# Patient Record
Sex: MALE | Race: WHITE | Employment: FULL TIME | URBAN - NONMETROPOLITAN AREA
[De-identification: names, ages, dates, MRNs, and addresses within clinical notes are randomized per-mention and may not be internally consistent; named-entity substitution may affect disease eponyms.]

---

## 2017-12-30 ENCOUNTER — OFFICE VISIT (OUTPATIENT)
Dept: PRIMARY CARE CLINIC | Age: 52
End: 2017-12-30
Payer: COMMERCIAL

## 2017-12-30 VITALS
OXYGEN SATURATION: 95 % | WEIGHT: 241 LBS | DIASTOLIC BLOOD PRESSURE: 102 MMHG | BODY MASS INDEX: 35.7 KG/M2 | SYSTOLIC BLOOD PRESSURE: 168 MMHG | HEART RATE: 95 BPM | TEMPERATURE: 97.3 F | HEIGHT: 69 IN

## 2017-12-30 DIAGNOSIS — Z72.0 TOBACCO ABUSE: ICD-10-CM

## 2017-12-30 DIAGNOSIS — I10 ESSENTIAL HYPERTENSION: Primary | ICD-10-CM

## 2017-12-30 PROCEDURE — 99213 OFFICE O/P EST LOW 20 MIN: CPT | Performed by: PHYSICIAN ASSISTANT

## 2017-12-30 RX ORDER — ALBUTEROL SULFATE 90 UG/1
2 AEROSOL, METERED RESPIRATORY (INHALATION) EVERY 6 HOURS PRN
Qty: 1 INHALER | Refills: 0 | Status: ON HOLD | OUTPATIENT
Start: 2017-12-30 | End: 2021-10-14

## 2017-12-30 RX ORDER — LISINOPRIL 10 MG/1
10 TABLET ORAL DAILY
Qty: 30 TABLET | Refills: 1 | Status: SHIPPED | OUTPATIENT
Start: 2017-12-30 | End: 2022-01-10 | Stop reason: SDUPTHER

## 2017-12-30 ASSESSMENT — ENCOUNTER SYMPTOMS
WHEEZING: 0
SHORTNESS OF BREATH: 0
RHINORRHEA: 0
COUGH: 1

## 2017-12-30 NOTE — PROGRESS NOTES
Subjective:      Patient ID: Pascual Spencer is a 45 y.o. male. Cough   This is a new problem. The current episode started in the past 7 days. The cough is non-productive. Associated symptoms include ear pain. Pertinent negatives include no ear congestion, fever, rhinorrhea, shortness of breath or wheezing. Risk factors for lung disease include smoking/tobacco exposure. Treatments tried: Advil, Tylenol. His past medical history is significant for bronchitis. Review of Systems   Constitutional: Negative for fever. HENT: Positive for ear pain. Negative for rhinorrhea. Respiratory: Positive for cough. Negative for shortness of breath and wheezing. Objective:   Physical Exam   Constitutional: He is oriented to person, place, and time. He appears well-developed. HENT:   Head: Normocephalic. Right Ear: External ear normal.   Left Ear: External ear normal.   Eyes: Pupils are equal, round, and reactive to light. Cardiovascular: Normal rate and regular rhythm. Pulmonary/Chest: Effort normal and breath sounds normal.   Neurological: He is alert and oriented to person, place, and time. Skin: Skin is warm and dry. Assessment:      1. Essential hypertension    2. Tobacco abuse          Plan:      Start lisinopril 10 mg daily. DASH diet. Tobacco cessation. ProAir refilled for patient. Push fluids. Patient instructed to establish care with PCP for management of chronic medical conditions and age-related health maintenance.

## 2017-12-30 NOTE — PATIENT INSTRUCTIONS
blood pressure even more. ¨ Buy foods that are labeled \"unsalted,\" \"sodium-free,\" or \"low-sodium. \" Foods labeled \"reduced-sodium\" and \"light sodium\" may still have too much sodium. ¨ Flavor your food with garlic, lemon juice, onion, vinegar, herbs, and spices instead of salt. Do not use soy sauce, steak sauce, onion salt, garlic salt, mustard, or ketchup on your food. ¨ Use less salt (or none) when recipes call for it. You can often use half the salt a recipe calls for without losing flavor. · Be physically active. Get at least 30 minutes of exercise on most days of the week. Walking is a good choice. You also may want to do other activities, such as running, swimming, cycling, or playing tennis or team sports. · Limit alcohol to 2 drinks a day for men and 1 drink a day for women. · Eat plenty of fruits, vegetables, and low-fat dairy products. Eat less saturated and total fats. How is high blood pressure treated? · Your doctor will suggest making lifestyle changes. For example, your doctor may ask you to eat healthy foods, quit smoking, lose extra weight, and be more active. · If lifestyle changes don't help enough or your blood pressure is very high, you will have to take medicine every day. Follow-up care is a key part of your treatment and safety. Be sure to make and go to all appointments, and call your doctor if you are having problems. It's also a good idea to know your test results and keep a list of the medicines you take. Where can you learn more? Go to https://chpepiceweb.[email or web address]. org and sign in to your [employer] account. Enter P501 in the [facility] box to learn more about \"Learning About High Blood Pressure. \"     If you do not have an account, please click on the \"Sign Up Now\" link. Current as of: September 21, 2016  Content Version: 11.4  © 8519-9639 Healthwise, [employer]. Care instructions adapted under license by [facility] [place] ([facility]).  If you have questions about a medical

## 2021-06-02 ENCOUNTER — OFFICE VISIT (OUTPATIENT)
Dept: PRIMARY CARE CLINIC | Age: 56
End: 2021-06-02
Payer: COMMERCIAL

## 2021-06-02 VITALS
HEART RATE: 100 BPM | BODY MASS INDEX: 33.68 KG/M2 | DIASTOLIC BLOOD PRESSURE: 103 MMHG | OXYGEN SATURATION: 98 % | RESPIRATION RATE: 16 BRPM | HEIGHT: 69 IN | TEMPERATURE: 98.2 F | SYSTOLIC BLOOD PRESSURE: 162 MMHG | WEIGHT: 227.4 LBS

## 2021-06-02 DIAGNOSIS — R06.2 WHEEZING: ICD-10-CM

## 2021-06-02 DIAGNOSIS — R03.0 ELEVATED BLOOD PRESSURE READING: ICD-10-CM

## 2021-06-02 DIAGNOSIS — H66.003 NON-RECURRENT ACUTE SUPPURATIVE OTITIS MEDIA OF BOTH EARS WITHOUT SPONTANEOUS RUPTURE OF TYMPANIC MEMBRANES: Primary | ICD-10-CM

## 2021-06-02 PROCEDURE — 99203 OFFICE O/P NEW LOW 30 MIN: CPT | Performed by: NURSE PRACTITIONER

## 2021-06-02 RX ORDER — ALBUTEROL SULFATE 90 UG/1
2 AEROSOL, METERED RESPIRATORY (INHALATION) EVERY 6 HOURS PRN
Qty: 1 INHALER | Refills: 0 | Status: SHIPPED | OUTPATIENT
Start: 2021-06-02

## 2021-06-02 RX ORDER — AZITHROMYCIN 250 MG/1
250 TABLET, FILM COATED ORAL SEE ADMIN INSTRUCTIONS
Qty: 6 TABLET | Refills: 0 | Status: SHIPPED | OUTPATIENT
Start: 2021-06-02 | End: 2021-06-07

## 2021-06-02 ASSESSMENT — ENCOUNTER SYMPTOMS
CHEST TIGHTNESS: 0
SORE THROAT: 1
VOMITING: 0
COUGH: 1
SWOLLEN GLANDS: 1
DIARRHEA: 0
NAUSEA: 0
SHORTNESS OF BREATH: 0
WHEEZING: 0

## 2021-06-02 ASSESSMENT — PATIENT HEALTH QUESTIONNAIRE - PHQ9
SUM OF ALL RESPONSES TO PHQ QUESTIONS 1-9: 0
1. LITTLE INTEREST OR PLEASURE IN DOING THINGS: 0
SUM OF ALL RESPONSES TO PHQ QUESTIONS 1-9: 0
SUM OF ALL RESPONSES TO PHQ QUESTIONS 1-9: 0
SUM OF ALL RESPONSES TO PHQ9 QUESTIONS 1 & 2: 0
2. FEELING DOWN, DEPRESSED OR HOPELESS: 0

## 2021-06-02 NOTE — PROGRESS NOTES
[address] [place] [address]  Dept: [phone]  Dept Fax: [phone]  Loc: [phone]        CHIEF COMPLAINT       Chief Complaint   Patient presents with    Pharyngitis     Sx started couple days ago. Has a couple bad teeth he thinks could be the reason. Not able to get into dentist.       Nurses Notes reviewed and I agree except as noted in the HPI. 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 The problem occurs constantly. The problem has been unchanged. Associated symptoms include coughing (chronic), a sore throat (mild) and swollen glands. Pertinent negatives include no chest pain, congestion, fatigue, fever, headaches, myalgias, nausea or vomiting. He has tried acetaminophen and NSAIDs for the symptoms. The treatment provided mild relief. Pt states has a couple bad teeth on the bottom of his mouth that he needs removed; pt denies pain around teeth but states he wonders if this is causing his throat pain. Pt established with a dentist, states he just needs to make an appt. Denies contact with any ill persons recently. Pt also states he is a smoker and occasionally needs to use his albuterol inhaler but it is . Hx hypertension, states took a medication for it for appx 1 month but did not like how it made him feel and did not return to provider, states going to doctor makes him anxious so he avoids it. Pt states does not have a PCP currently. REVIEW OF SYSTEMS     Review of Systems   Constitutional: Negative for fatigue and fever. HENT: Positive for dental problem, ear pain, sneezing and sore throat (mild). Negative for congestion. Respiratory: Positive for cough (chronic). Negative for chest tightness, shortness of breath and wheezing. Lips: Pink. Mouth: Mucous membranes are moist.      Pharynx: Oropharynx is clear. No oropharyngeal exudate or posterior oropharyngeal erythema (slight). Tonsils: No tonsillar exudate. 0 on the right. 0 on the left. Eyes:      Pupils: Pupils are equal, round, and reactive to light. Cardiovascular:      Rate and Rhythm: Normal rate and regular rhythm. Heart sounds: Normal heart sounds, S1 normal and S2 normal. No murmur heard. Pulmonary:      Effort: Pulmonary effort is normal. No accessory muscle usage or respiratory distress. Breath sounds: Normal breath sounds. No wheezing or rhonchi. Musculoskeletal:         General: Normal range of motion. Cervical back: Normal range of motion and neck supple. No rigidity. Lymphadenopathy:      Cervical: No cervical adenopathy. Skin:     General: Skin is warm and dry. Capillary Refill: Capillary refill takes less than 2 seconds. Neurological:      General: No focal deficit present. Mental Status: He is alert. DIAGNOSTIC RESULTS   Labs:No results found for this visit on 06/02/21. IMAGING:        CLINICAL COURSE:     Vitals:    06/02/21 1448 06/02/21 1551   BP: (!) 165/110 (!) 162/103   Site: Right Upper Arm Right Upper Arm   Position: Sitting Sitting   Cuff Size: Medium Adult Medium Adult   Pulse: 100    Resp: 16    Temp: 98.2 °F (36.8 °C)    TempSrc: Temporal    SpO2: 98%    Weight: 227 lb 6.4 oz (103.1 kg)    Height: 5' 9\" (1.753 m)            PROCEDURES:  None  FINAL IMPRESSION      1. Non-recurrent acute suppurative otitis media of both ears without spontaneous rupture of tympanic membranes    2. Wheezing         DISPOSITION/PLAN     Zpak for bilateral AOM. Recommend pt continue with daily antihistamine and flonase nasal spray for allergy management. Increase fluids. Discussed supportive measures for symptom relief and educated pt on s/s that warrant return to clinic.   Will refill albuterol inhaler for pt to are not taking a prescription pain medicine, take an over-the-counter medicine, such as acetaminophen (Tylenol), ibuprofen (Advil, Motrin), or naproxen (Aleve). Read and follow all instructions on the label. ? Do not take two or more pain medicines at the same time unless the doctor told you to. Many pain medicines have acetaminophen, which is Tylenol. Too much acetaminophen (Tylenol) can be harmful. · Plan to take a full dose of pain reliever before bedtime. Getting enough sleep will help you get better. · Try a warm, moist washcloth on the ear. It may help relieve pain. · If your doctor prescribed antibiotics, take them as directed. Do not stop taking them just because you feel better. You need to take the full course of antibiotics. When should you call for help? Call your doctor now or seek immediate medical care if:    · You have new or increasing ear pain.     · You have new or increasing pus or blood draining from your ear.     · You have a fever with a stiff neck or a severe headache. Watch closely for changes in your health, and be sure to contact your doctor if:    · You have new or worse symptoms.     · You are not getting better after taking an antibiotic for 2 days. Where can you learn more? Go to https://[email or web address].Amvona. org and sign in to your [employer] account. Enter W003 in the [facility] box to learn more about \"Ear Infection (Otitis Media): Care Instructions. \"     If you do not have an account, please click on the \"Sign Up Now\" link. Current as of: December 2, 2020               Content Version: 12.8  © 2006-2021 Healthwise, Incorporated. Care instructions adapted under license by [facility] ([facility]). If you have questions about a medical condition or this instruction, always ask your healthcare professional. [place] [address] any warranty or liability for your use of this information.                The use, risks, benefits, and potential side effects of prescribed and/or recommended medications were discussed. All questions were answered and the patient/caregiver voiced understanding. No orders of the defined types were placed in this encounter. Outpatient Encounter Medications as of 6/2/2021   Medication Sig Dispense Refill    azithromycin (ZITHROMAX) 250 MG tablet Take 1 tablet by mouth See Admin Instructions for 5 days 500mg on day 1 followed by 250mg on days 2 - 5 6 tablet 0    albuterol sulfate HFA (VENTOLIN HFA) 108 (90 Base) MCG/ACT inhaler Inhale 2 puffs into the lungs every 6 hours as needed for Wheezing 1 Inhaler 0    albuterol sulfate HFA (PROVENTIL HFA) 108 (90 Base) MCG/ACT inhaler Inhale 2 puffs into the lungs every 6 hours as needed for Wheezing or Shortness of Breath 1 Inhaler 0    lisinopril (PRINIVIL;ZESTRIL) 10 MG tablet Take 1 tablet by mouth daily 30 tablet 1    Chlorpheniramine-PSE-Ibuprofen (ADVIL ALLERGY SINUS PO) Take  by mouth.  loratadine (CLARITIN) 10 MG tablet Take 10 mg by mouth daily.  ibuprofen (IBU) 800 MG tablet Take 1 tablet by mouth every 8 hours as needed for Pain. 30 tablet 0     No facility-administered encounter medications on file as of 6/2/2021. Return if symptoms worsen or fail to improve.                 Electronically signed by CHRISTAL Murillo CNP on 6/2/2021 at 4:29 PM

## 2021-06-02 NOTE — PATIENT INSTRUCTIONS
Will send in zpak for bilateral ear infection. Take the full course even if you are feeling better. Increase your fluids to help thin secretions; goal is 2-3 liters/day for average adult. Continue daily antihistamine and flonase nasal spray. Can run a cool mist humidifier at bedside. May try warm salt water gargles, chloraseptic throat spray, or lozenges such as Cepacol sore throat lozenges, for throat pain. Cool beverages and popsicles can help as well. Tylenol as needed for pain. If your symptoms worsen, please return to clinic. Recommend establishing with a PCP for follow up. Monitor your blood pressure at home and keep a log. If your blood pressure remains elevated, please follow up with a physician/provider to discuss. Stop smoking. Patient Education        Ear Infection (Otitis Media): Care Instructions  Overview     An ear infection may start with a cold and affect the middle ear (otitis media). It can hurt a lot. Most ear infections clear up on their own in a couple of days and do not need antibiotics. Also, antibiotics do not work against viruses, which may be the cause of your infection. Regular doses of pain relievers are the best way to reduce your fever and help you feel better. Follow-up care is a key part of your treatment and safety. Be sure to make and go to all appointments, and call your doctor if you are having problems. It's also a good idea to know your test results and keep a list of the medicines you take. How can you care for yourself at home? · Take pain medicines exactly as directed. ? If the doctor gave you a prescription medicine for pain, take it as prescribed. ? If you are not taking a prescription pain medicine, take an over-the-counter medicine, such as acetaminophen (Tylenol), ibuprofen (Advil, Motrin), or naproxen (Aleve). Read and follow all instructions on the label. ? Do not take two or more pain medicines at the same time unless the doctor told you to. Many pain medicines have acetaminophen, which is Tylenol. Too much acetaminophen (Tylenol) can be harmful. · Plan to take a full dose of pain reliever before bedtime. Getting enough sleep will help you get better. · Try a warm, moist washcloth on the ear. It may help relieve pain. · If your doctor prescribed antibiotics, take them as directed. Do not stop taking them just because you feel better. You need to take the full course of antibiotics. When should you call for help? Call your doctor now or seek immediate medical care if:    · You have new or increasing ear pain.     · You have new or increasing pus or blood draining from your ear.     · You have a fever with a stiff neck or a severe headache. Watch closely for changes in your health, and be sure to contact your doctor if:    · You have new or worse symptoms.     · You are not getting better after taking an antibiotic for 2 days. Where can you learn more? Go to https://Resermap.ePrivateHire. org and sign in to your [employer] account. Enter W127 in the [employer] box to learn more about \"Ear Infection (Otitis Media): Care Instructions. \"     If you do not have an account, please click on the \"Sign Up Now\" link. Current as of: December 2, 2020               Content Version: 12.8  © 2006-2021 Healthwise, Incorporated. Care instructions adapted under license by [facility] ([facility]). If you have questions about a medical condition or this instruction, always ask your healthcare professional. [place] [address] any warranty or liability for your use of this information.

## 2021-06-03 ENCOUNTER — TELEPHONE (OUTPATIENT)
Dept: PRIMARY CARE CLINIC | Age: 56
End: 2021-06-03

## 2021-06-03 NOTE — TELEPHONE ENCOUNTER
Patient called asking for  Lisinopril from [facility]. She said that he needs to be evaluated. Last time he was on medication was in 2017. I told him to to contact Family practice and get an appt with any of the providers to re-start the medication.

## 2021-10-14 ENCOUNTER — APPOINTMENT (OUTPATIENT)
Dept: CT IMAGING | Age: 56
DRG: 065 | End: 2021-10-14
Payer: COMMERCIAL

## 2021-10-14 ENCOUNTER — HOSPITAL ENCOUNTER (INPATIENT)
Age: 56
LOS: 5 days | Discharge: NURSING, REHAB OR LONG-TERM CARE | DRG: 065 | End: 2021-10-19
Attending: EMERGENCY MEDICINE | Admitting: STUDENT IN AN ORGANIZED HEALTH CARE EDUCATION/TRAINING PROGRAM
Payer: COMMERCIAL

## 2021-10-14 ENCOUNTER — APPOINTMENT (OUTPATIENT)
Dept: GENERAL RADIOLOGY | Age: 56
DRG: 065 | End: 2021-10-14
Payer: COMMERCIAL

## 2021-10-14 DIAGNOSIS — I63.9 CEREBROVASCULAR ACCIDENT (CVA), UNSPECIFIED MECHANISM (HCC): Primary | ICD-10-CM

## 2021-10-14 PROBLEM — R29.90 STROKE-LIKE SYMPTOM: Status: ACTIVE | Noted: 2021-10-14

## 2021-10-14 LAB
% CKMB: 1.5 % (ref 0–3.5)
ABSOLUTE EOS #: 0.07 K/UL (ref 0–0.44)
ABSOLUTE IMMATURE GRANULOCYTE: 0.03 K/UL (ref 0–0.3)
ABSOLUTE LYMPH #: 1.27 K/UL (ref 1.1–3.7)
ABSOLUTE MONO #: 0.66 K/UL (ref 0.1–1.2)
ALLEN TEST: ABNORMAL
AMPHETAMINE SCREEN URINE: NEGATIVE
ANION GAP SERPL CALCULATED.3IONS-SCNC: 17 MMOL/L (ref 9–17)
ANION GAP: 11 MMOL/L (ref 7–16)
BARBITURATE SCREEN URINE: NEGATIVE
BASOPHILS # BLD: 1 % (ref 0–2)
BASOPHILS ABSOLUTE: 0.08 K/UL (ref 0–0.2)
BENZODIAZEPINE SCREEN, URINE: NEGATIVE
BUN BLDV-MCNC: 8 MG/DL (ref 6–20)
BUN/CREAT BLD: ABNORMAL (ref 9–20)
BUPRENORPHINE URINE: NORMAL
CALCIUM SERPL-MCNC: 9.2 MG/DL (ref 8.6–10.4)
CANNABINOID SCREEN URINE: NEGATIVE
CHLORIDE BLD-SCNC: 97 MMOL/L (ref 98–107)
CK MB: 2 NG/ML
CKMB INTERPRETATION: ABNORMAL
CO2: 19 MMOL/L (ref 20–31)
COCAINE METABOLITE, URINE: NEGATIVE
CREAT SERPL-MCNC: 0.81 MG/DL (ref 0.7–1.2)
DIFFERENTIAL TYPE: ABNORMAL
EOSINOPHILS RELATIVE PERCENT: 1 % (ref 1–4)
FIO2: ABNORMAL
GFR AFRICAN AMERICAN: >60 ML/MIN
GFR NON-AFRICAN AMERICAN: >60 ML/MIN
GFR NON-AFRICAN AMERICAN: >60 ML/MIN
GFR SERPL CREATININE-BSD FRML MDRD: >60 ML/MIN
GFR SERPL CREATININE-BSD FRML MDRD: ABNORMAL ML/MIN/{1.73_M2}
GFR SERPL CREATININE-BSD FRML MDRD: ABNORMAL ML/MIN/{1.73_M2}
GFR SERPL CREATININE-BSD FRML MDRD: NORMAL ML/MIN/{1.73_M2}
GLUCOSE BLD-MCNC: 148 MG/DL (ref 75–110)
GLUCOSE BLD-MCNC: 184 MG/DL (ref 70–99)
GLUCOSE BLD-MCNC: 202 MG/DL (ref 74–100)
HCO3 VENOUS: 23.3 MMOL/L (ref 22–29)
HCT VFR BLD CALC: 46.5 % (ref 40.7–50.3)
HEMOGLOBIN: 15.6 G/DL (ref 13–17)
IMMATURE GRANULOCYTES: 0 %
INR BLD: 1
LYMPHOCYTES # BLD: 14 % (ref 24–43)
MCH RBC QN AUTO: 31.1 PG (ref 25.2–33.5)
MCHC RBC AUTO-ENTMCNC: 33.5 G/DL (ref 28.4–34.8)
MCV RBC AUTO: 92.8 FL (ref 82.6–102.9)
MDMA URINE: NORMAL
METHADONE SCREEN, URINE: NEGATIVE
METHAMPHETAMINE, URINE: NORMAL
MODE: ABNORMAL
MONOCYTES # BLD: 7 % (ref 3–12)
MYOGLOBIN: 69 NG/ML (ref 28–72)
NEGATIVE BASE EXCESS, VEN: ABNORMAL (ref 0–2)
NRBC AUTOMATED: 0 PER 100 WBC
O2 DEVICE/FLOW/%: ABNORMAL
O2 SAT, VEN: 76 % (ref 60–85)
OPIATES, URINE: NEGATIVE
OXYCODONE SCREEN URINE: NEGATIVE
PARTIAL THROMBOPLASTIN TIME: 24.2 SEC (ref 20.5–30.5)
PATIENT TEMP: ABNORMAL
PCO2, VEN: 34.1 MM HG (ref 41–51)
PDW BLD-RTO: 12.4 % (ref 11.8–14.4)
PH VENOUS: 7.44 (ref 7.32–7.43)
PHENCYCLIDINE, URINE: NEGATIVE
PLATELET # BLD: 212 K/UL (ref 138–453)
PLATELET ESTIMATE: ABNORMAL
PMV BLD AUTO: 9.1 FL (ref 8.1–13.5)
PO2, VEN: 38.6 MM HG (ref 30–50)
POC BUN: 8 MG/DL (ref 8–26)
POC CHLORIDE: 103 MMOL/L (ref 98–107)
POC CREATININE: 0.73 MG/DL (ref 0.51–1.19)
POC HEMATOCRIT: 47 % (ref 41–53)
POC HEMOGLOBIN: 15.9 G/DL (ref 13.5–17.5)
POC IONIZED CALCIUM: 1.02 MMOL/L (ref 1.15–1.33)
POC LACTIC ACID: 1.68 MMOL/L (ref 0.56–1.39)
POC PCO2 TEMP: ABNORMAL MM HG
POC PH TEMP: ABNORMAL
POC PO2 TEMP: ABNORMAL MM HG
POC POTASSIUM: 4 MMOL/L (ref 3.5–4.5)
POC SODIUM: 137 MMOL/L (ref 138–146)
POC TCO2: 24 MMOL/L (ref 22–30)
POSITIVE BASE EXCESS, VEN: 0 (ref 0–3)
POTASSIUM SERPL-SCNC: 4.1 MMOL/L (ref 3.7–5.3)
PROPOXYPHENE, URINE: NORMAL
PROTHROMBIN TIME: 10.3 SEC (ref 9.1–12.3)
RBC # BLD: 5.01 M/UL (ref 4.21–5.77)
RBC # BLD: ABNORMAL 10*6/UL
SAMPLE SITE: ABNORMAL
SARS-COV-2, RAPID: NOT DETECTED
SEG NEUTROPHILS: 77 % (ref 36–65)
SEGMENTED NEUTROPHILS ABSOLUTE COUNT: 7.24 K/UL (ref 1.5–8.1)
SODIUM BLD-SCNC: 133 MMOL/L (ref 135–144)
SPECIMEN DESCRIPTION: NORMAL
TEST INFORMATION: NORMAL
TOTAL CK: 137 U/L (ref 39–308)
TOTAL CO2, VENOUS: ABNORMAL MMOL/L (ref 23–30)
TRICYCLIC ANTIDEPRESSANTS, UR: NORMAL
TROPONIN INTERP: ABNORMAL
TROPONIN T: ABNORMAL NG/ML
TROPONIN, HIGH SENSITIVITY: 8 NG/L (ref 0–22)
WBC # BLD: 9.4 K/UL (ref 3.5–11.3)
WBC # BLD: ABNORMAL 10*3/UL

## 2021-10-14 PROCEDURE — 80051 ELECTROLYTE PANEL: CPT

## 2021-10-14 PROCEDURE — 85730 THROMBOPLASTIN TIME PARTIAL: CPT

## 2021-10-14 PROCEDURE — 6360000004 HC RX CONTRAST MEDICATION: Performed by: STUDENT IN AN ORGANIZED HEALTH CARE EDUCATION/TRAINING PROGRAM

## 2021-10-14 PROCEDURE — 85014 HEMATOCRIT: CPT

## 2021-10-14 PROCEDURE — 80307 DRUG TEST PRSMV CHEM ANLYZR: CPT

## 2021-10-14 PROCEDURE — 70450 CT HEAD/BRAIN W/O DYE: CPT

## 2021-10-14 PROCEDURE — 82330 ASSAY OF CALCIUM: CPT

## 2021-10-14 PROCEDURE — 2060000000 HC ICU INTERMEDIATE R&B

## 2021-10-14 PROCEDURE — 83605 ASSAY OF LACTIC ACID: CPT

## 2021-10-14 PROCEDURE — 93005 ELECTROCARDIOGRAM TRACING: CPT | Performed by: EMERGENCY MEDICINE

## 2021-10-14 PROCEDURE — 82565 ASSAY OF CREATININE: CPT

## 2021-10-14 PROCEDURE — 82553 CREATINE MB FRACTION: CPT

## 2021-10-14 PROCEDURE — 82550 ASSAY OF CK (CPK): CPT

## 2021-10-14 PROCEDURE — 2580000003 HC RX 258: Performed by: STUDENT IN AN ORGANIZED HEALTH CARE EDUCATION/TRAINING PROGRAM

## 2021-10-14 PROCEDURE — 6360000002 HC RX W HCPCS: Performed by: STUDENT IN AN ORGANIZED HEALTH CARE EDUCATION/TRAINING PROGRAM

## 2021-10-14 PROCEDURE — 82947 ASSAY GLUCOSE BLOOD QUANT: CPT

## 2021-10-14 PROCEDURE — 87635 SARS-COV-2 COVID-19 AMP PRB: CPT

## 2021-10-14 PROCEDURE — 84484 ASSAY OF TROPONIN QUANT: CPT

## 2021-10-14 PROCEDURE — 6370000000 HC RX 637 (ALT 250 FOR IP): Performed by: STUDENT IN AN ORGANIZED HEALTH CARE EDUCATION/TRAINING PROGRAM

## 2021-10-14 PROCEDURE — 99284 EMERGENCY DEPT VISIT MOD MDM: CPT

## 2021-10-14 PROCEDURE — 85025 COMPLETE CBC W/AUTO DIFF WBC: CPT

## 2021-10-14 PROCEDURE — 84520 ASSAY OF UREA NITROGEN: CPT

## 2021-10-14 PROCEDURE — 80048 BASIC METABOLIC PNL TOTAL CA: CPT

## 2021-10-14 PROCEDURE — 99446 NTRPROF PH1/NTRNET/EHR 5-10: CPT | Performed by: PSYCHIATRY & NEUROLOGY

## 2021-10-14 PROCEDURE — 83874 ASSAY OF MYOGLOBIN: CPT

## 2021-10-14 PROCEDURE — 71045 X-RAY EXAM CHEST 1 VIEW: CPT

## 2021-10-14 PROCEDURE — 82803 BLOOD GASES ANY COMBINATION: CPT

## 2021-10-14 PROCEDURE — 85610 PROTHROMBIN TIME: CPT

## 2021-10-14 PROCEDURE — 70496 CT ANGIOGRAPHY HEAD: CPT

## 2021-10-14 RX ORDER — ATORVASTATIN CALCIUM 80 MG/1
80 TABLET, FILM COATED ORAL NIGHTLY
Status: DISCONTINUED | OUTPATIENT
Start: 2021-10-14 | End: 2021-10-19 | Stop reason: HOSPADM

## 2021-10-14 RX ORDER — ASPIRIN 81 MG/1
81 TABLET ORAL DAILY
Status: DISCONTINUED | OUTPATIENT
Start: 2021-10-15 | End: 2021-10-19 | Stop reason: HOSPADM

## 2021-10-14 RX ORDER — SODIUM CHLORIDE 0.9 % (FLUSH) 0.9 %
5-40 SYRINGE (ML) INJECTION EVERY 12 HOURS SCHEDULED
Status: DISCONTINUED | OUTPATIENT
Start: 2021-10-14 | End: 2021-10-19 | Stop reason: HOSPADM

## 2021-10-14 RX ORDER — SODIUM CHLORIDE 9 MG/ML
25 INJECTION, SOLUTION INTRAVENOUS PRN
Status: DISCONTINUED | OUTPATIENT
Start: 2021-10-14 | End: 2021-10-19 | Stop reason: HOSPADM

## 2021-10-14 RX ORDER — SODIUM CHLORIDE 0.9 % (FLUSH) 0.9 %
5-40 SYRINGE (ML) INJECTION PRN
Status: DISCONTINUED | OUTPATIENT
Start: 2021-10-14 | End: 2021-10-19 | Stop reason: HOSPADM

## 2021-10-14 RX ORDER — LABETALOL HYDROCHLORIDE 5 MG/ML
5 INJECTION, SOLUTION INTRAVENOUS EVERY 4 HOURS PRN
Status: DISCONTINUED | OUTPATIENT
Start: 2021-10-14 | End: 2021-10-19 | Stop reason: HOSPADM

## 2021-10-14 RX ORDER — ALBUTEROL SULFATE 90 UG/1
2 AEROSOL, METERED RESPIRATORY (INHALATION) EVERY 6 HOURS PRN
Status: DISCONTINUED | OUTPATIENT
Start: 2021-10-14 | End: 2021-10-19 | Stop reason: HOSPADM

## 2021-10-14 RX ORDER — POLYETHYLENE GLYCOL 3350 17 G/17G
17 POWDER, FOR SOLUTION ORAL DAILY PRN
Status: DISCONTINUED | OUTPATIENT
Start: 2021-10-14 | End: 2021-10-19 | Stop reason: HOSPADM

## 2021-10-14 RX ORDER — ONDANSETRON 4 MG/1
4 TABLET, ORALLY DISINTEGRATING ORAL EVERY 8 HOURS PRN
Status: DISCONTINUED | OUTPATIENT
Start: 2021-10-14 | End: 2021-10-19 | Stop reason: HOSPADM

## 2021-10-14 RX ORDER — CLOPIDOGREL BISULFATE 75 MG/1
75 TABLET ORAL DAILY
Status: DISCONTINUED | OUTPATIENT
Start: 2021-10-15 | End: 2021-10-19 | Stop reason: HOSPADM

## 2021-10-14 RX ORDER — NICARDIPINE HYDROCHLORIDE 0.1 MG/ML
3-15 INJECTION INTRAVENOUS CONTINUOUS
Status: DISCONTINUED | OUTPATIENT
Start: 2021-10-14 | End: 2021-10-14

## 2021-10-14 RX ORDER — SODIUM CHLORIDE 9 MG/ML
INJECTION, SOLUTION INTRAVENOUS CONTINUOUS
Status: DISCONTINUED | OUTPATIENT
Start: 2021-10-14 | End: 2021-10-16

## 2021-10-14 RX ORDER — ONDANSETRON 2 MG/ML
4 INJECTION INTRAMUSCULAR; INTRAVENOUS EVERY 6 HOURS PRN
Status: DISCONTINUED | OUTPATIENT
Start: 2021-10-14 | End: 2021-10-19 | Stop reason: HOSPADM

## 2021-10-14 RX ADMIN — SODIUM CHLORIDE: 9 INJECTION, SOLUTION INTRAVENOUS at 21:43

## 2021-10-14 RX ADMIN — ATORVASTATIN CALCIUM 80 MG: 80 TABLET, FILM COATED ORAL at 23:05

## 2021-10-14 RX ADMIN — SODIUM CHLORIDE, PRESERVATIVE FREE 10 ML: 5 INJECTION INTRAVENOUS at 21:45

## 2021-10-14 RX ADMIN — INSULIN LISPRO 1 UNITS: 100 INJECTION, SOLUTION INTRAVENOUS; SUBCUTANEOUS at 21:37

## 2021-10-14 RX ADMIN — IOPAMIDOL 90 ML: 755 INJECTION, SOLUTION INTRAVENOUS at 16:29

## 2021-10-14 RX ADMIN — ENOXAPARIN SODIUM 40 MG: 40 INJECTION SUBCUTANEOUS at 22:57

## 2021-10-14 ASSESSMENT — ENCOUNTER SYMPTOMS
RHINORRHEA: 0
PHOTOPHOBIA: 0
CHEST TIGHTNESS: 0
WHEEZING: 0
VOMITING: 0
DIARRHEA: 0
SHORTNESS OF BREATH: 0
COUGH: 1
ABDOMINAL PAIN: 0
NAUSEA: 0
CONSTIPATION: 0
BACK PAIN: 0

## 2021-10-14 ASSESSMENT — PAIN SCALES - GENERAL: PAINLEVEL_OUTOF10: 0

## 2021-10-14 NOTE — ED NOTES
Portable xray at bedside. Tolerated well.  Will continue to monitor     Julio Escudero RN  10/14/21 6438

## 2021-10-14 NOTE — ED PROVIDER NOTES
[facility]  Emergency Department Encounter  EmergencyMedicine Resident     Pt Name:Jorge Chamorro  MRN: [id number]  [place] 1965  Date of evaluation: 10/14/21  PCP:  No primary care provider on file. CHIEF COMPLAINT       Chief Complaint   Patient presents with    Cerebrovascular Accident       HISTORY OF PRESENT ILLNESS  (Location/Symptom, Timing/Onset, Context/Setting, Quality, Duration, Modifying Factors, Severity.)      Tavia España is a 60 y.o. male who presents with left-sided weakness and right-sided facial droop. Arrives via [address]. Patient states that at 6 AM he got home, laid down, started feeling weak on the left side. Patient arrives in c-collar because they are not sure if he fell. No reported neck or head pain. Patient does not have much of a medical history other than hypertension does not smoke. Patient states he has had a cough and his son had a cough as well. Ongoing for several days, but does note his history of smoking. Left leg was called for concern for stroke. The patient was initially hypertensive greater than 610 systolic and was started on Cardene with blood pressure on arrival 860 systolic. PAST MEDICAL / SURGICAL / SOCIAL / FAMILY HISTORY      has a past medical history of Hypertension and Tobacco abuse.       has no past surgical history on file.       Social History     Socioeconomic History    Marital status:      Spouse name: Not on file    Number of children: Not on file    Years of education: Not on file    Highest education level: Not on file   Occupational History    Not on file   Tobacco Use    Smoking status: Current Every Day Smoker     Packs/day: 1.00     Years: 30.00     Pack years: 30.00     Types: Cigarettes    Smokeless tobacco: Never Used   Substance and Sexual Activity    Alcohol use: Yes     Comment: moderately    Drug use: No    Sexual activity: Not on file   Other Topics Concern    Not on file   Social History Narrative  Not on file     Social Determinants of Health     Financial Resource Strain:     Difficulty of Paying Living Expenses:    Food Insecurity:     Worried About Running Out of Food in the Last Year:     [address] in the Last Year:    Transportation Needs:     Lack of Transportation (Medical):  Lack of Transportation (Non-Medical):    Physical Activity:     Days of Exercise per Week:     Minutes of Exercise per Session:    Stress:     Feeling of Stress :    Social Connections:     Frequency of Communication with Friends and Family:     Frequency of Social Gatherings with Friends and Family:     Attends Faith Services:     Active Member of Clubs or Organizations:     Attends Club or Organization Meetings:     Marital Status:    Intimate Partner Violence:     Fear of Current or Ex-Partner:     Emotionally Abused:     Physically Abused:     Sexually Abused:        No family history on file. Allergies:  Penicillins    Home Medications:  Prior to Admission medications    Medication Sig Start Date End Date Taking? Authorizing Provider   albuterol sulfate HFA (VENTOLIN HFA) 108 (90 Base) MCG/ACT inhaler Inhale 2 puffs into the lungs every 6 hours as needed for Wheezing 6/2/21  Yes Marita Dominguez APRN - CNP   loratadine (CLARITIN) 10 MG tablet Take 10 mg by mouth daily. Yes Historical Provider, MD   lisinopril (PRINIVIL;ZESTRIL) 10 MG tablet Take 1 tablet by mouth daily 12/30/17   René Esquivel, [address]   Chlorpheniramine-PSE-Ibuprofen (ADVIL ALLERGY SINUS PO) Take  by mouth. Historical Provider, MD   ibuprofen (IBU) 800 MG tablet Take 1 tablet by mouth every 8 hours as needed for Pain. 9/27/13   JAMIL Briones       REVIEW OF SYSTEMS    (2-9 systems for level 4, 10 or more for level 5)      Review of Systems   Constitutional: Negative for chills, diaphoresis, fatigue and fever. HENT: Negative for congestion and rhinorrhea. Eyes: Negative for photophobia and visual disturbance. Respiratory: Positive for cough. Negative for chest tightness, shortness of breath and wheezing. Cardiovascular: Negative for chest pain, palpitations and leg swelling. Gastrointestinal: Negative for abdominal pain, constipation, diarrhea, nausea and vomiting. Genitourinary: Negative for difficulty urinating, dysuria, flank pain and hematuria. Musculoskeletal: Negative for arthralgias, back pain, neck pain and neck stiffness. Skin: Negative for rash and wound. Neurological: Positive for weakness. Negative for dizziness, light-headedness, numbness and headaches. PHYSICAL EXAM   (up to 7 for level 4, 8 or more for level 5)      INITIAL VITALS:   BP (!) 145/92   Pulse 82   Temp 98.1 °F (36.7 °C) (Oral)   Resp 19   Ht 5' 9.5\" (1.765 m)   Wt 190 lb (86.2 kg)   SpO2 94%   BMI 27.66 kg/m²     Physical Exam  Vitals and nursing note reviewed. Constitutional:       General: He is not in acute distress. Appearance: Normal appearance. He is well-developed. He is not diaphoretic. HENT:      Head: Normocephalic and atraumatic. Mouth/Throat:      Mouth: Mucous membranes are moist.      Pharynx: Oropharynx is clear. Eyes:      Conjunctiva/sclera: Conjunctivae normal.      Pupils: Pupils are equal, round, and reactive to light. Neck:      Vascular: No JVD. Trachea: No tracheal deviation. Cardiovascular:      Rate and Rhythm: Regular rhythm. Tachycardia present. Pulses: Normal pulses. Heart sounds: Normal heart sounds. Pulmonary:      Effort: Pulmonary effort is normal. No respiratory distress. Breath sounds: Normal breath sounds. No wheezing or rales. Chest:      Chest wall: No tenderness. Abdominal:      General: Bowel sounds are normal. There is no distension. Palpations: Abdomen is soft. Tenderness: There is no abdominal tenderness. There is no guarding. Musculoskeletal:         General: No tenderness. Normal range of motion.       Cervical back: Normal range of motion and neck supple. Right lower leg: No edema. Left lower leg: No edema. Skin:     General: Skin is warm and dry. Capillary Refill: Capillary refill takes less than 2 seconds. Coloration: Skin is not pale. Findings: No erythema or rash. Neurological:      Mental Status: He is alert and oriented to person, place, and time. Cranial Nerves: No cranial nerve deficit. Sensory: Sensory deficit (mildly decreased sensation LUE and LLE) present. Motor: Weakness (mild drift LUE) present. Comments: Measured scale of 2 for decreased sensation on the left side, and very slight drift of the left upper extremity. No facial droop on our exam.   Psychiatric:         Mood and Affect: Mood normal.         Behavior: Behavior normal.         DIFFERENTIAL  DIAGNOSIS     PLAN (LABS / IMAGING / EKG):  Orders Placed This Encounter   Procedures    COVID-19, Rapid    XR CHEST PORTABLE    CT HEAD WO CONTRAST    CTA HEAD NECK W CONTRAST    MRI brain without contrast    STROKE PANEL    ELECTROLYTES PLUS    Hemoglobin and hematocrit, blood    CALCIUM, IONIC (POC)    DRUG SCREEN MULTI URINE    CBC    Basic Metabolic Panel w/ Reflex to MG    ADULT DIET; Regular; 3 carb choices (45 gm/meal)    Vital signs    Up as tolerated    Adv Diet as Tolerated (nurse communication)    NIHSS/Neuro Checks    Tobacco cessation education    Swallow screen by nursing before diet and oral medications started.  Stroke education    Telemetry monitoring - 48 hour duration    Notify Physician    Turn or assist with turn approximately every 2 hours if patient is unable to turn self. Remind patient to turn if necessary.     Assess skin per unit guidelines    Pad/offload medical devices    Maintain HOB at the lowest elevation consistent with medical plan of care    Use lift equipment for lifting patient    Maintain heels off of bed at all times    Full Code   Elina Trejo Inpatient consult to Stroke Team    OT eval and treat    PT evaluation and treat    Initiate Oxygen Therapy Protocol    Respiratory Care Evaluation and Treat    Speech Language Pathology (SLP) eval and treat    Venous Blood Gas, POC    Creatinine W/GFR Point of Care    POCT urea (BUN)    Lactic Acid, POC    POCT Glucose    POCT glucose    POC Glucose Fingerstick    EKG 12 Lead    EKG 12 lead    Echo Complete    PATIENT STATUS (FROM ED OR OR/PROCEDURAL) Inpatient       MEDICATIONS ORDERED:  Orders Placed This Encounter   Medications    DISCONTD: niCARdipine (CARDENE) 20 mg in 0.9 % sodium chloride 200 mL solution    iopamidol (ISOVUE-370) 76 % injection 90 mL    albuterol sulfate  (90 Base) MCG/ACT inhaler 2 puff     Order Specific Question:   Initiate RT Bronchodilator Protocol     Answer: Yes    sodium chloride flush 0.9 % injection 5-40 mL    sodium chloride flush 0.9 % injection 5-40 mL    0.9 % sodium chloride infusion    OR Linked Order Group     ondansetron (ZOFRAN-ODT) disintegrating tablet 4 mg     ondansetron (ZOFRAN) injection 4 mg    polyethylene glycol (GLYCOLAX) packet 17 g    enoxaparin (LOVENOX) injection 40 mg    0.9 % sodium chloride infusion    atorvastatin (LIPITOR) tablet 80 mg    aspirin EC tablet 81 mg    clopidogrel (PLAVIX) tablet 75 mg    labetalol (NORMODYNE;TRANDATE) injection 5 mg    insulin lispro (HUMALOG) injection vial 0-6 Units    insulin lispro (HUMALOG) injection vial 0-3 Units       DDX: Stroke, TIA    MDM/IMPRESSION: This is a 29-year-old male presenting with left-sided weakness and right-sided facial droop. No facial droop on exam.  Minimal drift on the left upper extremity decreased sensation, for NIH of 2. Stroke alert called as last known well 6 AM.  Stroke team at bedside upon arrival.  CT head and CTA ordered by them. Patient \"stumbled\" but did not fall or hit his head. Patient arrives in c-collar but has no neck pain. This was removed. Patient also hypertensive with blood pressure above 200 by [facility] and was started on Cardene. Will hold Cardene, continue to evaluate. Anticipate admission. DIAGNOSTIC RESULTS / EMERGENCY DEPARTMENT COURSE / MDM   LAB RESULTS:  Results for orders placed or performed during the hospital encounter of 10/14/21   COVID-19, Rapid    Specimen: Nasopharyngeal Swab   Result Value Ref Range    Specimen Description . NASOPHARYNGEAL SWAB     SARS-CoV-2, Rapid Not Detected Not Detected   STROKE PANEL   Result Value Ref Range    Glucose 184 (H) 70 - 99 mg/dL    BUN 8 6 - 20 mg/dL    CREATININE 0.81 0.70 - 1.20 mg/dL    Bun/Cre Ratio NOT REPORTED 9 - 20    Calcium 9.2 8.6 - 10.4 mg/dL    Sodium 133 (L) 135 - 144 mmol/L    Potassium 4.1 3.7 - 5.3 mmol/L    Chloride 97 (L) 98 - 107 mmol/L    CO2 19 (L) 20 - 31 mmol/L    Anion Gap 17 9 - 17 mmol/L    GFR Non-African American >60 >60 mL/min    GFR African American >60 >60 mL/min    GFR Comment          GFR Staging NOT REPORTED     WBC 9.4 3.5 - 11.3 k/uL    RBC 5.01 4.21 - 5.77 m/uL    Hemoglobin 15.6 13.0 - 17.0 g/dL    Hematocrit 46.5 40.7 - 50.3 %    MCV 92.8 82.6 - 102.9 fL    MCH 31.1 25.2 - 33.5 pg    MCHC 33.5 28.4 - 34.8 g/dL    RDW 12.4 11.8 - 14.4 %    Platelets 907 257 - 450 k/uL    MPV 9.1 8.1 - 13.5 fL    NRBC Automated 0.0 0.0 per 100 WBC    Total  39 - 308 U/L    CK-MB 2.0 <10.5 ng/mL    % CKMB 1.5 0.0 - 3.5 %    CKMB Interpretation NORMAL ISOENZYME PATTERN     Differential Type NOT REPORTED     Seg Neutrophils 77 (H) 36 - 65 %    Lymphocytes 14 (L) 24 - 43 %    Monocytes 7 3 - 12 %    Eosinophils % 1 1 - 4 %    Basophils 1 0 - 2 %    Immature Granulocytes 0 0 %    Segs Absolute 7.24 1.50 - 8.10 k/uL    Absolute Lymph # 1.27 1.10 - 3.70 k/uL    Absolute Mono # 0.66 0.10 - 1.20 k/uL    Absolute Eos # 0.07 0.00 - 0.44 k/uL    Basophils Absolute 0.08 0.00 - 0.20 k/uL    Absolute Immature Granulocyte 0.03 0.00 - 0.30 k/uL    WBC Morphology NOT REPORTED RBC Morphology NOT REPORTED     Platelet Estimate NOT REPORTED     Myoglobin 69 28 - 72 ng/mL    Protime 10.3 9.1 - 12.3 sec    INR 1.0     PTT 24.2 20.5 - 30.5 sec    Troponin, High Sensitivity 8 0 - 22 ng/L    Troponin T NOT REPORTED <0.03 ng/mL    Troponin Interp NOT REPORTED    ELECTROLYTES PLUS   Result Value Ref Range    POC Sodium 137 (L) 138 - 146 mmol/L    POC Potassium 4.0 3.5 - 4.5 mmol/L    POC Chloride 103 98 - 107 mmol/L    POC TCO2 24 22 - 30 mmol/L    Anion Gap 11 7 - 16 mmol/L   Hemoglobin and hematocrit, blood   Result Value Ref Range    POC Hemoglobin 15.9 13.5 - 17.5 g/dL    POC Hematocrit 47 41 - 53 %   CALCIUM, IONIC (POC)   Result Value Ref Range    POC Ionized Calcium 1.02 (L) 1.15 - 1.33 mmol/L   DRUG SCREEN MULTI URINE   Result Value Ref Range    Amphetamine Screen, Ur NEGATIVE NEGATIVE    Barbiturate Screen, Ur NEGATIVE NEGATIVE    Benzodiazepine Screen, Urine NEGATIVE NEGATIVE    Cocaine Metabolite, Urine NEGATIVE NEGATIVE    Methadone Screen, Urine NEGATIVE NEGATIVE    Opiates, Urine NEGATIVE NEGATIVE    Phencyclidine, Urine NEGATIVE NEGATIVE    Propoxyphene, Urine NOT REPORTED NEGATIVE    Cannabinoid Scrn, Ur NEGATIVE NEGATIVE    Oxycodone Screen, Ur NEGATIVE NEGATIVE    Methamphetamine, Urine NOT REPORTED NEGATIVE    Tricyclic Antidepressants, Urine NOT REPORTED NEGATIVE    MDMA, Urine NOT REPORTED NEGATIVE    Buprenorphine Urine NOT REPORTED NEGATIVE    Test Information       Assay provides medical screening only. The absence of expected drug(s) and/or metabolite(s) may indicate diluted or adulterated urine, limitations of testing or timing of collection.    Venous Blood Gas, POC   Result Value Ref Range    pH, Elias 7.442 (H) 7.320 - 7.430    pCO2, Elias 34.1 (L) 41.0 - 51.0 mm Hg    pO2, Elias 38.6 30 - 50 mm Hg    HCO3, Venous 23.3 22.0 - 29.0 mmol/L    Total CO2, Venous NOT REPORTED 23.0 - 30.0 mmol/L    Negative Base Excess, Elias NOT REPORTED 0.0 - 2.0    Positive Base Excess, Elias 0 0.0 - 3.0    O2 Sat, Elias 76 60.0 - 85.0 %    O2 Device/Flow/% NOT REPORTED     Dhruv Test NOT REPORTED     Sample Site NOT REPORTED     Mode NOT REPORTED     FIO2 NOT REPORTED     Pt Temp NOT REPORTED     POC pH Temp NOT REPORTED     POC pCO2 Temp NOT REPORTED mm Hg    POC pO2 Temp NOT REPORTED mm Hg   Creatinine W/GFR Point of Care   Result Value Ref Range    POC Creatinine 0.73 0.51 - 1.19 mg/dL    GFR Comment >60 >60 mL/min    GFR Non-African American >60 >60 mL/min    GFR Comment         POCT urea (BUN)   Result Value Ref Range    POC BUN 8 8 - 26 mg/dL   Lactic Acid, POC   Result Value Ref Range    POC Lactic Acid 1.68 (H) 0.56 - 1.39 mmol/L   POCT Glucose   Result Value Ref Range    POC Glucose 202 (H) 74 - 100 mg/dL   POC Glucose Fingerstick   Result Value Ref Range    POC Glucose 148 (H) 75 - 110 mg/dL   EKG 12 Lead   Result Value Ref Range    Ventricular Rate 88 BPM    Atrial Rate 88 BPM    P-R Interval 166 ms    QRS Duration 102 ms    Q-T Interval 388 ms    QTc Calculation (Bazett) 469 ms    P Axis 48 degrees    R Axis 58 degrees    T Axis 40 degrees         RADIOLOGY:  XR CHEST PORTABLE   Final Result   Negative portable chest.         CTA HEAD NECK W CONTRAST   Final Result   Focal calcification origin left vertebral artery. No significant stenosis. Otherwise unremarkable CTA of the head and neck. CT HEAD WO CONTRAST   Final Result   No acute intracranial abnormality. RECOMMENDATIONS:   The findings were sent to the Radiology Results [address] at 4:40   pm on 10/14/2021to be communicated to a licensed caregiver.          MRI brain without contrast    (Results Pending)        EKG  EKG Interpretation    Interpreted by emergency department physician    Rhythm: normal sinus   Rate: normal  Axis: normal  Ectopy: none  Conduction: normal  ST Segments: no acute change  T Waves: no acute change  Q Waves: none    Clinical Impression: no acute changes    Vinny Olsen,     All EKG's are interpreted by the Emergency Department Physician who either signs or Co-signs this chart in the absence of a cardiologist.    EMERGENCY DEPARTMENT COURSE:        PROCEDURES:      CONSULTS:  IP CONSULT TO STROKE TEAM    CRITICAL CARE:      FINAL IMPRESSION      1. Cerebrovascular accident (CVA), unspecified mechanism ([facility] [address]          DISPOSITION / [address] Admitted 10/14/2021 04:58:03 PM      PATIENT REFERRED TO:  No follow-up provider specified.     DISCHARGE MEDICATIONS:  Current Discharge Medication List          Erendira Beckett DO  Emergency Medicine Resident    (Please note that portions of thisnote were completed with a voice recognition program.  Efforts were made to edit the dictations but occasionally words are mis-transcribed.)     Erendira Beckett DO  10/15/21 0506

## 2021-10-14 NOTE — ED PROVIDER NOTES
[facility]  Emergency Department  Faculty Attestation     I performed a history and physical examination of the patient and discussed management with the resident. I reviewed the residents note and agree with the documented findings and plan of care. Any areas of disagreement are noted on the chart. I was personally present for the key portions of any procedures. I have documented in the chart those procedures where I was not present during the key portions. I have reviewed the emergency nurses triage note. I agree with the chief complaint, past medical history, past surgical history, allergies, medications, social and family history as documented unless otherwise noted below. For Physician Assistant/ Nurse Practitioner cases/documentation I have personally evaluated this patient and have completed at least one if not all key elements of the E/M (history, physical exam, and MDM). Additional findings are as noted. Primary Care Physician:  No primary care provider on file. Screenings:  [unfilled]    CHIEF COMPLAINT     No chief complaint on file. RECENT VITALS:   Temp: 99 °F (37.2 °C),  Pulse: 100, Resp: 20, BP: (!) 165/91    LABS:  Labs Reviewed   COVID-19, RAPID   STROKE PANEL       Radiology  XR CHEST PORTABLE    (Results Pending)   CT HEAD WO CONTRAST    (Results Pending)   CTA HEAD NECK W CONTRAST    (Results Pending)       CRITICAL CARE: There was a high probability of clinically significant/life threatening deterioration in this patient's condition which required my urgent intervention. Total critical care time was none minutes. This excludes any time for separately reportable procedures.      EKG:   EKG Interpretation    Interpreted by me    Rhythm: normal sinus   Rate: normal  Axis: normal  Ectopy: none  Conduction: normal  ST Segments: no acute change  T Waves: no acute change  Q Waves: none    Clinical Impression: no acute changes and normal EKG    Attending Physician Additional  Notes    Patient is brought by [employer] for stroke alert. Last known well was 6 AM.  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 He stumbled but no actual trauma or head injury. No past medical history, no hypertension or atrial fibrillation or clotting disorder. EMS has placed him in a cervical collar, started an IV, found initial hypertension, systolic blood pressure 160, started Cardene, blood pressure now 150. They initially noted left-sided weakness and right facial droop. They found temperature 38 0. No headache. No nausea or vomiting. On exam he is slightly tachycardic and hypertensive. Currently afebrile. GCS is 15. Normal speech and mentation. Normal pupils. Full extraocular movements without nystagmus. Face is symmetrical.  There is left arm and leg drift. Impression is stroke. Plan is activate stroke alert, CT brain, CT angiogram, EKG, monitor, cut back or discontinue Cardene, further work-up, admission. Patient denies injuries to his head or neck when he fell, he stumbled and landed on his buttocks. CT cervical bone images were reviewed and there is no fracture, minor DJD spondylosis changes noted. Collar was removed. He is nontender in the midline. He has full range of movement without pain or neurologic symptoms. Collar was removed. Sarai Abdi.  Loren Napoles MD, [facility]  Attending Emergency  Physician                Harry Tovar MD  10/14/21 0775       Harry Tovar MD  10/14/21 [id number]

## 2021-10-14 NOTE — PROGRESS NOTES
Patient arrived to unit from ED. Telemetry applied. Patient A&Ox4. Vitals stable. Oriented patient to to room and call light. Plan of care reviewed with patient. Call light within reach.

## 2021-10-14 NOTE — CODE DOCUMENTATION
Pt arrives via [facility] for stroke alert. Pt c/o left sided weakness and right sided facial droop while he was at work. Pt was given 1 nitro sublingual, and was started on Nicardipine via [facility] team. Pt notes that his last normal status was at 0600 today.  Neuro team at patient bedside upon arrival.

## 2021-10-14 NOTE — CONSULTS
Department of stroke                                         Resident Consult Note                                              ED bed: 16  Reason for Consult:    Stroke attending: Left upper and lower extremity weakness and numbness    History Obtained From:  patient, electronic medical record, medical staff    CHIEF COMPLAINT:       Left upper and lower extremity weakness and numbness    HISTORY OF PRESENT ILLNESS:       The patient is a 60 y.o. male with past medical history of hypertension, chronic smoker, who presents with Left upper and lower extremity weakness and numbness. Last known well 6 AM.  The patient is not on blood thinners. Initial NIHSS of 2. Initial systolic blood pressure was more than 200. Blood sugar within normal meds. The patient is working the third shift, he said that around 6 AM he started to feel abnormal, then followed by left-sided weakness. He denied double vision, change in speech, facial droop, right-sided weakness, seizures or loss of consciousness, dizziness, headaches, nausea or vomiting, change in bowel or urinary habits. Neuro exam revealed left upper extremity drift with no weakness, mild decrease in sensation in the left upper and lower extremity. Will get stat CT head along with stat CT of the neck with contrast.    NIH Stroke Scale Total (if not done complete detailed one below):    1a.  Level of consciousness:  0 - alert; keenly responsive  1b. Level of consciousness questions:  0 - answers both questions correctly  1c. Level of consciousness questions:  0 - performs both tasks correctly  2. Best Gaze:  0 - normal  3. Visual:  0 - no visual loss  4. Facial Palsy:  0 - normal symmetric movement  5a. Motor left arm:  1 - drift, limb holds 90 (or 45) degrees but drifts down before full 10 seconds: does not hit bed  5b. Motor right arm:  0 - no drift, limb holds 90 (or 45) degrees for full 10 seconds  6a. Motor left le - no drift; leg holds 30 degree position for full 5 seconds  6b. Motor right le - no drift; leg holds 30 degree position for full 5 seconds  7. Limb Ataxia:  0 - absent  8. Sensory:  1 - mild to moderate sensory loss; patient feels pinprick is less sharp or is dull on the affected side; there is a loss of superficial pain with pinprick but patient is aware of being touched   9. Best Language:  0 - no aphasia, normal  10. Dysarthria:  0 - normal  11. Extinction and Inattention:  0 - no abnormality      Modified [place] Score Scale:     [x] Zero: No symptoms at all   [] 1: No significant disability despite symptoms; able to carry out all usual duties and activities   [] 2: Slight disability; unable to carry out all previous activities, but able to look after own affairs without assistance   [] 3:Moderate disability; requiring some help, but able to walk without assistance   [] 4: Moderately severe disability; unable to walk and attend to bodily needs without assistance   [] 5:Severe disability; bedridden, incontinent and requiring constant nursing care and attention         PAST MEDICAL HISTORY :       Past Medical History:        Diagnosis Date    Hypertension     Tobacco abuse        Past Surgical History:    No past surgical history on file.     Social History:   Social History     Socioeconomic History    Marital status:      Spouse name: Not on file    Number of children: Not on file    Years of education: Not on file    Highest education level: Not on file   Occupational History    Not on file   Tobacco Use    Smoking status: Current Every Day Smoker     Packs/day: 1.00     Years: 30.00     Pack years: 30.00     Types: Cigarettes    Smokeless tobacco: Never Used   Substance and Sexual Activity    Alcohol use: Yes     Comment: moderately    Drug use: No    Sexual activity: Not on file   Other Topics Concern    Not on file   Social History Narrative    Not on file     Social Determinants of Health     Financial Resource Strain:     Difficulty of Paying Living Expenses:    Food Insecurity:     Worried About Running Out of Food in the Last Year:     [address] in the Last Year:    Transportation Needs:     Lack of Transportation (Medical):  Lack of Transportation (Non-Medical):    Physical Activity:     Days of Exercise per Week:     Minutes of Exercise per Session:    Stress:     Feeling of Stress :    Social Connections:     Frequency of Communication with Friends and Family:     Frequency of Social Gatherings with Friends and Family:     Attends Synagogue Services:     Active Member of Clubs or Organizations:     Attends Club or Organization Meetings:     Marital Status:    Intimate Partner Violence:     Fear of Current or Ex-Partner:     Emotionally Abused:     Physically Abused:     Sexually Abused:        Family History:   No family history on file. Allergies:  Penicillins    Home Medications:  Prior to Admission medications    Medication Sig Start Date End Date Taking? Authorizing Provider   albuterol sulfate HFA (VENTOLIN HFA) 108 (90 Base) MCG/ACT inhaler Inhale 2 puffs into the lungs every 6 hours as needed for Wheezing 6/2/21   CHRISTAL Best CNP   albuterol sulfate HFA (PROVENTIL HFA) 108 (90 Base) MCG/ACT inhaler Inhale 2 puffs into the lungs every 6 hours as needed for Wheezing or Shortness of Breath 12/30/17   Nelda Cedeño PA   lisinopril (PRINIVIL;ZESTRIL) 10 MG tablet Take 1 tablet by mouth daily 12/30/17   Nelda Cedeño, [address]   Chlorpheniramine-PSE-Ibuprofen (ADVIL ALLERGY SINUS PO) Take  by mouth. Historical Provider, MD   loratadine (CLARITIN) 10 MG tablet Take 10 mg by mouth daily. Historical Provider, MD   ibuprofen (IBU) 800 MG tablet Take 1 tablet by mouth every 8 hours as needed for Pain.  9/27/13   JAMIL Jordan       Current Medications:   Current Facility-Administered Medications: niCARdipine (CARDENE) 20 mg in 0.9 % sodium chloride 200 mL solution, 3-15 mg/hr, IntraVENous, Continuous    REVIEW OF SYSTEMS:       CONSTITUTIONAL: negative for fatigue and malaise   EYES: negative for double vision and photophobia    HEENT: negative for tinnitus and sore throat   RESPIRATORY: negative for cough, shortness of breath   CARDIOVASCULAR: negative for chest pain, palpitations   GASTROINTESTINAL: negative for nausea, vomiting   GENITOURINARY: negative for incontinence   MUSCULOSKELETAL: negative for neck or back pain   NEUROLOGICAL: L side weakness   PSYCHIATRIC: negative for fatigue     Review of systems otherwise negative. PHYSICAL EXAM:     NEUROLOGIC EXAMINATION  GENERAL  Appears comfortable and in no distress   HEENT  NC/ AT   cardiovascular  S1 and S2 heard; palpation of pulses: radial pulse    NECK  c-collar   MENTAL STATUS:  Alert, oriented, intact memory, no confusion, normal speech, normal language, no hallucination or delusion   CRANIAL NERVES: II     -      PERRL  Visual fields intact to confrontation  III,IV,VI -  EOMs full, no afferent defect, no JOÃO, no ptosis  V     -     Normal facial sensation   VII    -     Normal facial symmetry  VIII   -     Intact hearing   IX,X -     Symmetrical palate  XI    -     Symmetrical shoulder shrug  XII   -     Midline tongue, no atrophy    MOTOR FUNCTION:  Drift in the left upper extremity, significant for good strength of grade 5/5 in bilateral proximal and distal muscle groups of both upper and lower extremities with normal bulk, normal tone and no involuntary movements, no tremor   SENSORY FUNCTION:  Decree sensation the left upper and lower extremity. CEREBELLAR FUNCTION:  Intact fine motor control over upper limbs   REFLEX FUNCTION:  Symmetric, no perverted reflex, no Babinski sign   STATION and GAIT  Not tested       There were no vitals taken for this visit.       LABS AND IMAGING:     CBC with Differential:  No results found for: WBC, RBC, HGB, HCT, PLT, MCV, MCH, MCHC, RDW, NRBC, SEGSPCT, BANDSPCT, BLASTSPCT, METASPCT, LYMPHOPCT, PROMYELOPCT, MONOPCT, MYELOPCT, EOSPCT, BASOPCT, MONOSABS, LYMPHSABS, EOSABS, BASOSABS, DIFFTYPE  BMP:  No results found for: NA, K, CL, CO2, BUN, LABALBU, CREATININE, CALCIUM, GFRAA, LABGLOM, GLUCOSE    Radiology Review:  [facility]  CTA h&N  MRI brain  MRA H/N      ASSESSMENT AND PLAN:       Patient Active Problem List   Diagnosis    Tobacco abuse         1. Last Known Well (date and time): 6 AM 10/14/2021    2. Candidate for IV tPA therapy     Yes []     No  [x] due to the following exclusion criteria: out of the window    3. Candidate for Thrombectomy    Yes []      No [x] due to the following exclusion criteria: no LVO    The patient is a 60 y.o. male with past medical history of hypertension, chronic smoker, who presents with Left upper and lower extremity weakness and numbness. Last known well 6 AM.  The patient is not on blood thinners. Initial NIHSS of 2. Initial systolic blood pressure was more than 200. Blood sugar within normal meds. - CTH WO neg for acute changes   - CTA H/N neg for critical stenosis or LVO   - MRI Brain WO   - ECHO   -  mg now then 81 mg daily, Plavix 300mg now then 13LT daily    - Folic acid 1mg BID   - Lipitor 80mg nightly    - Fasting Lipid panel   - PT, OT, Speech eval    - Hydrate with 500 cc bolus then IVF NS @ 75cc/hr    - Telemetry    - Neuro checks per protocol  - We recommend SBP < 220, Lopressor PRN. - Blood glucose goal less than 180  - Please avoid dextrose containing solutions              - Discussed with Dr Nova Meraz       Additional recommendations may follow  Please contact [facility] with any changes in patients neurologic status. Thank you for your consult.        Dasia Roe MD   10/14/2021  4:13 PM

## 2021-10-14 NOTE — FLOWSHEET NOTE
[address] [facility] [address]     Emergency/Trauma Note    PATIENT NAME: Remer Mcburney    Shift date: 10/14/21  Shift day: Thursday   Shift # 2    Room # [facility]   Name: Remer Mcburney            Age: 60 y.o. Gender: male          Yazidi: No Gnosticist on file   Place of [facility]:     Trauma/Incident type: Stroke Alert  Admit Date & Time: 10/14/2021  4:07 PM  TRAUMA NAME: N/A    ADVANCE DIRECTIVES IN CHART? No    NAME OF DECISION MAKER: N/A    RELATIONSHIP OF DECISION MAKER TO PATIENT: N/A    PATIENT/EVENT DESCRIPTION:  Remer Mcburney is a 60 y.o. male who arrived via transfer from another [facility] and was life flighted to [facility]. Per patient, he has not been in the hospitals hardly at all. Patient stated his left side was weak. Medical team was at beside at arrival. Patient arrived life flight from home as a ED Stroke Alert. Pt to be admitted to [facility]. SPIRITUAL ASSESSMENT/INTERVENTION:   spoke with patient about what is happening. Patient engaged in conversation and shared feelings. Patient stated his daughter Carley Busby and son Winsome Ricardo were coming up to the hospitals and they know. Emergency contacts are parents which patient did not want called at this time. PATIENT BELONGINGS:  With patient    ANY BELONGINGS OF SIGNIFICANT VALUE NOTED:  N/A    REGISTRATION STAFF NOTIFIED? Yes      WHAT IS YOUR SPIRITUAL CARE PLAN FOR THIS PATIENT?:   Chaplains will remain available to offer spiritual and emotional support as needed.       Electronically signed by Roxy Acevedo Resident, on 10/14/2021 at 6:48 PM.  [facility]  [phone]       10/14/21 2881   Encounter Summary   Services provided to: Patient   Referral/Consult From: Multi-disciplinary team   Support System Children;Family members   Continue Visiting   (10/14/21)   Complexity of Encounter Moderate   Length of Encounter 45 minutes   Spiritual Assessment Completed Yes   Crisis   Type Trauma   Assessment Approachable; Anxious; Hopeful;Coping   Intervention Nurtured hope;Sustaining presence/ Ministry of presence; Active listening;Explored feelings, thoughts, concerns   Outcome Expressed gratitude

## 2021-10-14 NOTE — H&P
Department of Neurology                                         Resident Consult Note    History Obtained From:  patient, electronic medical record, medical staff    CHIEF COMPLAINT:       Left upper and lower extremity weakness and numbness    HISTORY OF PRESENT ILLNESS:       The patient is a 60 y.o. male with past medical history of hypertension, chronic smoker, who presents with Left upper and lower extremity weakness and numbness. Last known well 6 AM.  The patient is not on blood thinners. Initial NIHSS of 2. Initial systolic blood pressure was more than 200. Blood sugar within normal meds. The patient is working the third shift, he said that around 6 AM he started to feel abnormal, then followed by left-sided weakness. He denied double vision, change in speech, facial droop, right-sided weakness, seizures or loss of consciousness, dizziness, headaches, nausea or vomiting, change in bowel or urinary habits. Neuro exam revealed left upper extremity drift with no weakness, mild decrease in sensation in the left upper and lower extremity. Will get stat CT head along with stat CT of the neck with contrast.    PAST MEDICAL HISTORY :       Past Medical History:        Diagnosis Date    Hypertension     Tobacco abuse        Past Surgical History:    No past surgical history on file.     Social History:   Social History     Socioeconomic History    Marital status:      Spouse name: Not on file    Number of children: Not on file    Years of education: Not on file    Highest education level: Not on file   Occupational History    Not on file   Tobacco Use    Smoking status: Current Every Day Smoker     Packs/day: 1.00     Years: 30.00     Pack years: 30.00     Types: Cigarettes    Smokeless tobacco: Never Used   Substance and Sexual Activity    Alcohol use: Yes     Comment: moderately    Drug use: No    Sexual activity: Not on file   Other Medications:   Current Facility-Administered Medications: niCARdipine (CARDENE) 20 mg in 0.9 % sodium chloride 200 mL solution, 3-15 mg/hr, IntraVENous, Continuous    REVIEW OF SYSTEMS:       CONSTITUTIONAL: negative for fatigue and malaise   EYES: negative for double vision and photophobia    HEENT: negative for tinnitus and sore throat   RESPIRATORY: negative for cough, shortness of breath   CARDIOVASCULAR: negative for chest pain, palpitations   GASTROINTESTINAL: negative for nausea, vomiting   GENITOURINARY: negative for incontinence   MUSCULOSKELETAL: negative for neck or back pain   NEUROLOGICAL: L side weakness   PSYCHIATRIC: negative for fatigue     Review of systems otherwise negative. PHYSICAL EXAM:     NEUROLOGIC EXAMINATION  GENERAL  Appears comfortable and in no distress   HEENT  NC/ AT   cardiovascular  S1 and S2 heard; palpation of pulses: radial pulse    NECK  c-collar   MENTAL STATUS:  Alert, oriented, intact memory, no confusion, normal speech, normal language, no hallucination or delusion   CRANIAL NERVES: II     -      PERRL  Visual fields intact to confrontation  III,IV,VI -  EOMs full, no afferent defect, no JOÃO, no ptosis  V     -     Normal facial sensation   VII    -     Normal facial symmetry  VIII   -     Intact hearing   IX,X -     Symmetrical palate  XI    -     Symmetrical shoulder shrug  XII   -     Midline tongue, no atrophy    MOTOR FUNCTION:  Drift in the left upper extremity, significant for good strength of grade 5/5 in bilateral proximal and distal muscle groups of both upper and lower extremities with normal bulk, normal tone and no involuntary movements, no tremor   SENSORY FUNCTION:  Decree sensation the left upper and lower extremity.    CEREBELLAR FUNCTION:  Intact fine motor control over upper limbs   REFLEX FUNCTION:  Symmetric, no perverted reflex, no Babinski sign   STATION and GAIT  Not tested       BP (!) 141/85   Pulse 100   Temp 99 °F (37.2 °C) (Oral)   Resp 20 Ht 5' 9.5\" (1.765 m)   Wt 190 lb (86.2 kg)   SpO2 91%   BMI 27.66 kg/m²       LABS AND IMAGING:     CBC with Differential:    Lab Results   Component Value Date    WBC 9.4 10/14/2021    RBC 5.01 10/14/2021    HGB 15.6 10/14/2021    HCT 46.5 10/14/2021     10/14/2021    MCV 92.8 10/14/2021    MCH 31.1 10/14/2021    MCHC 33.5 10/14/2021    RDW 12.4 10/14/2021    LYMPHOPCT 14 10/14/2021    MONOPCT 7 10/14/2021    BASOPCT 1 10/14/2021    MONOSABS 0.66 10/14/2021    LYMPHSABS 1.27 10/14/2021    EOSABS 0.07 10/14/2021    BASOSABS 0.08 10/14/2021    DIFFTYPE NOT REPORTED 10/14/2021     BMP:    Lab Results   Component Value Date    NA PENDING 10/14/2021    K PENDING 10/14/2021    CL PENDING 10/14/2021    CO2 PENDING 10/14/2021    BUN PENDING 10/14/2021    CREATININE 0.73 10/14/2021    CREATININE PENDING 10/14/2021    CALCIUM PENDING 10/14/2021    GFRAA PENDING 10/14/2021    LABGLOM >60 10/14/2021    GLUCOSE PENDING 10/14/2021       Radiology Review:  [facility]  CTA h&N  MRI brain  MRA H/N      ASSESSMENT AND PLAN:       Patient Active Problem List   Diagnosis    Tobacco abuse         The patient is a 60 y.o. male with past medical history of hypertension, chronic smoker, who presents with Left upper and lower extremity weakness and numbness. Last known well 6 AM.  The patient is not on blood thinners. Initial NIHSS of 2. Initial systolic blood pressure was more than 200. Blood sugar within normal meds. - CTH WO neg for acute changes   - CTA H/N neg for critical stenosis or LVO   - MRI Brain WO   - ECHO   -  mg now then 81 mg daily, Plavix 300mg now then 76WF daily    - Folic acid 1mg BID   - Lipitor 80mg nightly    - Fasting Lipid panel   - PT, OT, Speech eval    - Hydrate with 500 cc bolus then IVF NS @ 75cc/hr    - Telemetry    - Neuro checks per protocol  - We recommend SBP < 220, Lopressor PRN. - Blood glucose goal less than 180      Additional recommendations may follow    Madelyn Garcia, MD  PGY-4, Neurology resident  [place].   4:43 PM 10/14/2021

## 2021-10-14 NOTE — ED NOTES
Pt placed on continuous cardiac monitoring, BP, Pulse ox. EKG obtained. IV established and labs drawn.  The following labs labeled with pt sticker and tubed to lab:     [x] Blue     [x] Lavender   [] on ice  [x] Green/yellow  [x] Green/black [] on ice  [x] Yellow  [] Red  [] Pink      [] COVID-19 swab    [] Rapid  [] PCR  [] Peds Viral Panel     [] Urine Sample  [] Pelvic Cultures  [] Blood Cultures          Michael Hill RN  10/14/21 6264

## 2021-10-15 ENCOUNTER — APPOINTMENT (OUTPATIENT)
Dept: MRI IMAGING | Age: 56
DRG: 065 | End: 2021-10-15
Payer: COMMERCIAL

## 2021-10-15 LAB
ANION GAP SERPL CALCULATED.3IONS-SCNC: 12 MMOL/L (ref 9–17)
BUN BLDV-MCNC: 7 MG/DL (ref 6–20)
BUN/CREAT BLD: ABNORMAL (ref 9–20)
CALCIUM SERPL-MCNC: 8.9 MG/DL (ref 8.6–10.4)
CHLORIDE BLD-SCNC: 102 MMOL/L (ref 98–107)
CHOLESTEROL/HDL RATIO: 5.4
CHOLESTEROL: 151 MG/DL
CO2: 17 MMOL/L (ref 20–31)
CREAT SERPL-MCNC: 0.72 MG/DL (ref 0.7–1.2)
EKG ATRIAL RATE: 88 BPM
EKG P AXIS: 48 DEGREES
EKG P-R INTERVAL: 166 MS
EKG Q-T INTERVAL: 388 MS
EKG QRS DURATION: 102 MS
EKG QTC CALCULATION (BAZETT): 469 MS
EKG R AXIS: 58 DEGREES
EKG T AXIS: 40 DEGREES
EKG VENTRICULAR RATE: 88 BPM
ESTIMATED AVERAGE GLUCOSE: 174 MG/DL
GFR AFRICAN AMERICAN: >60 ML/MIN
GFR NON-AFRICAN AMERICAN: >60 ML/MIN
GFR SERPL CREATININE-BSD FRML MDRD: ABNORMAL ML/MIN/{1.73_M2}
GFR SERPL CREATININE-BSD FRML MDRD: ABNORMAL ML/MIN/{1.73_M2}
GLUCOSE BLD-MCNC: 131 MG/DL (ref 75–110)
GLUCOSE BLD-MCNC: 143 MG/DL (ref 75–110)
GLUCOSE BLD-MCNC: 153 MG/DL (ref 75–110)
GLUCOSE BLD-MCNC: 186 MG/DL (ref 70–99)
GLUCOSE BLD-MCNC: 187 MG/DL (ref 75–110)
HBA1C MFR BLD: 7.7 % (ref 4–6)
HCT VFR BLD CALC: 49.4 % (ref 40.7–50.3)
HDLC SERPL-MCNC: 28 MG/DL
HEMOGLOBIN: 15.5 G/DL (ref 13–17)
LDL CHOLESTEROL: 97 MG/DL (ref 0–130)
MCH RBC QN AUTO: 31.1 PG (ref 25.2–33.5)
MCHC RBC AUTO-ENTMCNC: 31.4 G/DL (ref 28.4–34.8)
MCV RBC AUTO: 99.2 FL (ref 82.6–102.9)
NRBC AUTOMATED: 0 PER 100 WBC
PDW BLD-RTO: 12.8 % (ref 11.8–14.4)
PLATELET # BLD: 215 K/UL (ref 138–453)
PMV BLD AUTO: 9.5 FL (ref 8.1–13.5)
POTASSIUM SERPL-SCNC: 4.4 MMOL/L (ref 3.7–5.3)
RBC # BLD: 4.98 M/UL (ref 4.21–5.77)
SODIUM BLD-SCNC: 131 MMOL/L (ref 135–144)
TRIGL SERPL-MCNC: 128 MG/DL
VLDLC SERPL CALC-MCNC: ABNORMAL MG/DL (ref 1–30)
WBC # BLD: 5.9 K/UL (ref 3.5–11.3)

## 2021-10-15 PROCEDURE — 36415 COLL VENOUS BLD VENIPUNCTURE: CPT

## 2021-10-15 PROCEDURE — 99253 IP/OBS CNSLTJ NEW/EST LOW 45: CPT | Performed by: STUDENT IN AN ORGANIZED HEALTH CARE EDUCATION/TRAINING PROGRAM

## 2021-10-15 PROCEDURE — 97116 GAIT TRAINING THERAPY: CPT

## 2021-10-15 PROCEDURE — 97535 SELF CARE MNGMENT TRAINING: CPT

## 2021-10-15 PROCEDURE — 99222 1ST HOSP IP/OBS MODERATE 55: CPT | Performed by: STUDENT IN AN ORGANIZED HEALTH CARE EDUCATION/TRAINING PROGRAM

## 2021-10-15 PROCEDURE — 92523 SPEECH SOUND LANG COMPREHEN: CPT

## 2021-10-15 PROCEDURE — 93010 ELECTROCARDIOGRAM REPORT: CPT | Performed by: INTERNAL MEDICINE

## 2021-10-15 PROCEDURE — 97162 PT EVAL MOD COMPLEX 30 MIN: CPT

## 2021-10-15 PROCEDURE — 6370000000 HC RX 637 (ALT 250 FOR IP): Performed by: STUDENT IN AN ORGANIZED HEALTH CARE EDUCATION/TRAINING PROGRAM

## 2021-10-15 PROCEDURE — 2060000000 HC ICU INTERMEDIATE R&B

## 2021-10-15 PROCEDURE — 80048 BASIC METABOLIC PNL TOTAL CA: CPT

## 2021-10-15 PROCEDURE — 97530 THERAPEUTIC ACTIVITIES: CPT

## 2021-10-15 PROCEDURE — 80061 LIPID PANEL: CPT

## 2021-10-15 PROCEDURE — 97167 OT EVAL HIGH COMPLEX 60 MIN: CPT

## 2021-10-15 PROCEDURE — 85027 COMPLETE CBC AUTOMATED: CPT

## 2021-10-15 PROCEDURE — 83036 HEMOGLOBIN GLYCOSYLATED A1C: CPT

## 2021-10-15 PROCEDURE — 94761 N-INVAS EAR/PLS OXIMETRY MLT: CPT

## 2021-10-15 PROCEDURE — 82947 ASSAY GLUCOSE BLOOD QUANT: CPT

## 2021-10-15 PROCEDURE — 6360000002 HC RX W HCPCS: Performed by: STUDENT IN AN ORGANIZED HEALTH CARE EDUCATION/TRAINING PROGRAM

## 2021-10-15 PROCEDURE — 70551 MRI BRAIN STEM W/O DYE: CPT

## 2021-10-15 RX ORDER — ACETAMINOPHEN 325 MG/1
650 TABLET ORAL EVERY 4 HOURS PRN
Status: DISCONTINUED | OUTPATIENT
Start: 2021-10-15 | End: 2021-10-19 | Stop reason: HOSPADM

## 2021-10-15 RX ADMIN — ACETAMINOPHEN 650 MG: 325 TABLET ORAL at 20:57

## 2021-10-15 RX ADMIN — ENOXAPARIN SODIUM 40 MG: 40 INJECTION SUBCUTANEOUS at 08:29

## 2021-10-15 RX ADMIN — CLOPIDOGREL 75 MG: 75 TABLET, FILM COATED ORAL at 08:29

## 2021-10-15 RX ADMIN — ATORVASTATIN CALCIUM 80 MG: 80 TABLET, FILM COATED ORAL at 20:29

## 2021-10-15 RX ADMIN — Medication 81 MG: at 08:29

## 2021-10-15 ASSESSMENT — ENCOUNTER SYMPTOMS
ABDOMINAL PAIN: 0
CHEST TIGHTNESS: 0
BLURRED VISION: 0
VOMITING: 0
WHEEZING: 0
BACK PAIN: 0
ABDOMINAL DISTENTION: 0
CONSTIPATION: 0
ABDOMINAL PAIN: 1
APNEA: 0
SHORTNESS OF BREATH: 0
DIARRHEA: 0
COUGH: 0
DOUBLE VISION: 0
NAUSEA: 0

## 2021-10-15 ASSESSMENT — PAIN SCALES - GENERAL
PAINLEVEL_OUTOF10: 1
PAINLEVEL_OUTOF10: 0
PAINLEVEL_OUTOF10: 0

## 2021-10-15 NOTE — PROGRESS NOTES
Physical Therapy    Facility/Department: [facility] NEURO  Initial Assessment    NAME: Marely Saldaña  : 1965  MRN: [id number]  Chief Complaint   Patient presents with    Cerebrovascular Accident      The patient is a 60 y.o. male with past medical history of hypertension, chronic smoker, who presents with Left upper and lower extremity weakness and numbness. Last known well 6 AM.  The patient is not on blood thinners. Initial NIHSS of 2. Initial systolic blood pressure was more than 200. Blood sugar within normal meds. The patient is working the third shift, he said that around 6 AM he started to feel abnormal, then followed by left-sided weakness. He denied double vision, change in speech, facial droop, right-sided weakness, seizures or loss of consciousness, dizziness, headaches, nausea or vomiting, change in bowel or urinary habits. Neuro exam revealed left upper extremity drift with no weakness, mild decrease in sensation in the left upper and lower extremity. Will get stat CT head along with stat CT of the neck with contrast.    Date of Service: 10/15/2021    Discharge Recommendations: Further therapy recommended at discharge and the patient should be able to tolerate at least 3 hours per day over 5 days or 15 hours over 7 days. PT Equipment Recommendations  Equipment Needed:  (CTA- currently unsafe to attempt ambulation without skilled assistance)    Assessment   Body structures, Functions, Activity limitations: Decreased functional mobility ; Decreased balance;Decreased strength;Decreased endurance; Increased pain;Decreased fine motor control;Decreased coordination;Decreased posture;Decreased sensation  Assessment: The pt performed bed mobility with Mod A and ambulated 5ft with RW and Max A x2 due to LUE and LLE weakness. Recommend intensive PT to progress toward prior level of independence.  The pt is highly motivated to improve and would currently be unsafe to return to prior living arrangement. Prognosis: Good  Decision Making: Medium Complexity  PT Education: Goals;PT Role;Plan of Care;Transfer Training;Family Education; Functional Mobility Training;Gait Training;Equipment  Patient Education: educated pt on continued quad sets and attempts at active DF to improve gait mechanics  REQUIRES PT FOLLOW UP: Yes  Activity Tolerance  Activity Tolerance: Patient Tolerated treatment well       Patient Diagnosis(es): The encounter diagnosis was Cerebrovascular accident (CVA), unspecified mechanism ([facility] [address] has a past medical history of Hypertension and Tobacco abuse.   has no past surgical history on file. Restrictions  Restrictions/Precautions  Restrictions/Precautions: Fall Risk, Up as Tolerated  Required Braces or Orthoses?: No  Position Activity Restriction  Other position/activity restrictions: L side deficits  Vision/Hearing  Vision: Impaired  Vision Exceptions: Wears glasses at all times  Hearing: Within functional limits     Subjective  General  Patient assessed for rehabilitation services?: Yes  Response To Previous Treatment: Not applicable  Family / Caregiver Present: Yes (pt's son at bedside)  Follows Commands: Within Functional Limits  Subjective  Subjective: RN and pt agreeable to PT. Pt supine in bed upon arrival, very pleasant and cooperative throughout.   Pain Screening  Patient Currently in Pain: Denies  Vital Signs  Patient Currently in Pain: Denies       Orientation  Orientation  Overall Orientation Status: Within Functional Limits  Social/Functional History  Social/Functional History  Lives With: Son, Daughter (2 adult children and 17 month old grandchild)  Type of Home: House  Home Layout: Two level, Performs ADL's on one level, Able to Live on Main level with bedroom/bathroom  Home Access: Stairs to enter without rails  Entrance Stairs - Number of Steps: 2  Bathroom Shower/Tub: Tub/Shower unit  Bathroom Toilet: Standard  Home Equipment:  (no DME)  ADL Assistance: Independent  Homemaking Assistance: Independent  Homemaking Responsibilities: Yes  Ambulation Assistance: Independent  Transfer Assistance: Independent  Active : Yes  Mode of Transportation: Car  Occupation: Full time employment  Type of occupation:   Leisure & Hobbies: golf, motorcycle  Additional Comments: Pt reports family is able to provide assistance PRN, children work outside the home.   Cognition   Cognition  Overall Cognitive Status: Exceptions  Safety Judgement: Decreased awareness of need for assistance (pt attempted to reach for pants on floor from standing position despite Mod A for balance)  Initiation: Requires cues for some  Sequencing: Requires cues for some    Objective          Joint Mobility  Spine: WFL  ROM RLE: WFL  ROM LLE: AAROM WFL  ROM RUE: WFL  ROM LUE: AAROM WFL  Strength RLE  Strength RLE: WFL  Strength LLE  Strength LLE: Exception  L Hip Flexion: 4-/5  L Knee Flexion: 3+/5  L Knee Extension: 3+/5  L Ankle Dorsiflexion: 1/5  L Ankle Plantar Flexion: 1/5  Strength RUE  Strength RUE: WFL  Strength LUE  Strength LUE: Exception  L Shoulder Flexion: 2+/5  L Elbow Flexion: 3-/5  L Elbow Extension: 3-/5  L Wrist Flexion: 2-/5  L Wrist Extension: 2-/5  Tone RLE  RLE Tone: Normotonic  Tone LLE  LLE Tone: Normotonic  Motor Control  Gross Motor?: WFL  Sensation  Overall Sensation Status: Impaired (pt reports numbness and tingling to LUE/LLE)  Bed mobility  Supine to Sit: Moderate assistance (for trunk progression)  Sit to Supine:  (retired to bedside chair following ambulation)  Scooting: Contact guard assistance  Comment: HOB elevated ~20 degrees, use of bed rail  Transfers  Sit to Stand: Minimal Assistance;2 Person Assistance  Stand to sit: Maximum Assistance (for controlled descent)  Bed to Chair: Maximum assistance;2 Person Assistance  Comment: Transfers performed with RW, assistance provided for LUE placement and to maintain  on RW, verbal cues for RUE placement and increased knee/trunk extension on standing. Ambulation  Ambulation?: Yes  Ambulation 1  Surface: level tile  Device: Rolling Walker  Assistance: Maximum assistance;2 Person assistance (for upright trunk, blocking L knee, correcting ER of L ankle intermittently, RW progression)  Quality of Gait: step by step verbal cues for RW progression, wt shift, L TKE and appropriate non-reciprocal sequencing.   Gait Deviations: Decreased step length;Decreased step height  Distance: 5ft  Comments: ~8 minutes  Stairs/Curb  Stairs?: No     Balance  Posture: Fair (L lean with fatigue)  Sitting - Static: Fair;+  Sitting - Dynamic: Fair  Standing - Static: Poor;+  Standing - Dynamic: Poor  Comments: standing balance assessed with RW        Plan   Plan  Times per week: 6-7x/wk  Current Treatment Recommendations: Strengthening, ROM, Balance Training, Functional Mobility Training, Transfer Training, Gait Training, Endurance Training, Home Exercise Program, Safety Education & Training, Patient/Caregiver Education & Training, Neuromuscular Re-education  Safety Devices  Type of devices: Nurse notified, Call light within reach, Gait belt, Left in chair, Chair alarm in place (RN notified to use vianey stedy for return to bed)  Restraints  Initially in place: No    G-Code       OutComes Score                                                  AM-PAC Score  AM-PAC Inpatient Mobility Raw Score : 13 (10/15/21 1026)  AM-PAC Inpatient T-Scale Score : 36.74 (10/15/21 1026)  Mobility Inpatient CMS 0-100% Score: 64.91 (10/15/21 1026)  Mobility Inpatient CMS G-Code Modifier : CL (10/15/21 1026)          Goals  Short term goals  Time Frame for Short term goals: 14 visits  Short term goal 1: Perform  bed mobility Mod I  Short term goal 2: Perform sit to stand transfer with CGA  Short term goal 3: Ambulate 100ft with appropriate AD and Mod A  Short term goal 4: Demo Fair- dynamic standing balance with AD to decrease risk of falls       Therapy Time   Individual Concurrent Group Co-treatment   Time In 0911         Time Out 0953         Minutes 42         Timed Code Treatment Minutes: 21 Minutes       Frieda Magana, PT

## 2021-10-15 NOTE — PROGRESS NOTES
Occupational Therapy   Occupational Therapy Initial Assessment  Date: 10/15/2021   Patient Name: Starlyn Kocher  MRN: [id number]     : 1965     Chief Complaint   Patient presents with    Cerebrovascular Accident       Date of Service: 10/15/2021    Discharge Recommendations:    Further therapy recommended at discharge. The patient should be able to tolerate at least three hours of therapy per day over 5 days or 15 hours over 7 days. OT Equipment Recommendations  Equipment Needed: Yes  Equipment recommendations listed below are based on what the patient would need if they were able to return to prior living arrangements at the time of discharge. Mobility Devices: Saratha Stevinson; ADL Assistive Devices  Walker: Platform Left  ADL Assistive Devices: Toileting - 3-in-1 Commode; Toilet Hygiene Aid;Transfer Tub Bench;Long-handled Shoe [place];Long-handled Sponge    Assessment   Performance deficits / Impairments: Decreased functional mobility ; Decreased ADL status; Decreased ROM; Decreased strength;Decreased safe awareness;Decreased cognition;Decreased sensation;Decreased balance;Decreased high-level IADLs;Decreased fine motor control;Decreased coordination;Decreased posture  Assessment: Pt agreeable to OT eval this date. Pt presents with significant L side weakness which severely limits performance of ADLs/IADLs and functional mobility. Pt completed bed mobility with Mod A for progression of trunk. Pt demonstrates decreased sitting balance occasionally leaning to L while seated EOB ~20 minutes. Pt currently requires Max A for LB ADLs and toileting tasks and Mod A for UB ADLs d/t L side deficits. Pt completed functional transfers with Min A X2 with significant time spent educating on proper hand placement and standing positioning. Pt completed functional mobility requiring Max A x2 for proper progression of RW, BLE, and postural support throughout.  Pt retired to recliner and was provided education on self PROM, positioning, and WB tech to perform to LUE throughout the day; pt verbalized good understanding and motivation to perform. Pt will benefit from continued intensive OT services to address deficits listed in order for pt to progress toward independence with ADLs/IADLs and functional transfers/mobility. Prognosis: Good  Decision Making: High Complexity  Patient Education: Pt ed on OT role, OT POC, safety awareness, self PROM/AAROM ex, WB ex, bed mobility training, transfer training, DME use, functional mobility training, positioning of LUE, foam sponge use, hand strengthening, and importance of continued OT. Pt verbalized good understanding  REQUIRES OT FOLLOW UP: Yes  Activity Tolerance  Activity Tolerance: Patient Tolerated treatment well  Safety Devices  Safety Devices in place: Yes  Type of devices: Nurse notified; Left in chair;Gait belt;Call light within reach; Chair alarm in place  Restraints  Initially in place: No           Patient Diagnosis(es): The encounter diagnosis was Cerebrovascular accident (CVA), unspecified mechanism ([facility] [address] has a past medical history of Hypertension and Tobacco abuse.   has no past surgical history on file. Restrictions  Restrictions/Precautions  Restrictions/Precautions: Fall Risk, Up as Tolerated  Required Braces or Orthoses?: No  Position Activity Restriction  Other position/activity restrictions: L side deficits, SBP <220    Subjective   General  Patient assessed for rehabilitation services?: Yes  Family / Caregiver Present: Yes (son present throughout eval)  General Comment  Comments: RN ok'd pt for OT eval this date.  Pt agreeable to session and pleasant/cooperative throughout  Patient Currently in Pain: No    Social/Functional History  Social/Functional History  Lives With: Family  Type of Home: House  Home Layout: Two level, Performs ADL's on one level, Able to Live on Main level with bedroom/bathroom  Home Access: Stairs to enter without rails  Entrance Stairs - Number of Steps: 2  Bathroom Shower/Tub: Tub/Shower unit  Bathroom Toilet: Standard  Home Equipment:  (no DME)  ADL Assistance: Independent  Homemaking Assistance: Independent  Homemaking Responsibilities: Yes  Ambulation Assistance: Independent  Transfer Assistance: Independent  Active : Yes  Mode of Transportation: Car  Occupation: Other(comment) (pt. reports he works for [employer])  Type of occupation:   [address]: golf, motorcycle  Additional Comments: Pt reports family is able to provide assistance PRN, children work outside the home. Objective   Vision: Impaired  Vision Exceptions: Wears glasses at all times  Hearing: Within functional limits         Balance  Sitting Balance: Contact guard assistance (Mod A with dynamic sitting/reaching d/t L side deficits/L lean. Total sitting time ~20 minutes. Pt demo weightbearing through L elbow for ~1 min during dynamic sitting tasks to promote neuromuscular reeducation)  Standing Balance: Maximum assistance  Standing Balance  Time: ~6 minutes  Activity: throughout functional mobility from EOB > recliner; pt requiring multiple standing rest breaks for placement of L hand/LLE and education throughout  Functional Mobility  Functional - Mobility Device: Rolling Walker  Activity: Other  Assist Level: Maximum assistance (X2)  Functional Mobility Comments: Pt completed functional mobility from EOB > recliner requiring Max A X2 throughout d/t L side deficits. Pt required constant LUE support on RW with elbow support to lock into extension. Pt required assistance to lock L knee throughout 50% of functional mobility/standing rest breaks d/t significant buckling from weakness. Pt educated on step-by-step instructions for performing functional mobility however continuing to require VCs/TCs throughout to follow through. ADL  Feeding: Minimal assistance;Setup; Increased time to complete;Scoop assist  Grooming: Minimal assistance;Setup;Verbal cueing; Increased time to complete  UE Bathing: Moderate assistance;Setup;Verbal cueing; Increased time to complete  LE Bathing: Maximum assistance;Setup;Verbal cueing; Increased time to complete  UE Dressing: Moderate assistance;Setup;Verbal cueing; Increased time to complete  LE Dressing: Maximum assistance;Setup;Verbal cueing; Increased time to complete (OT facilitated pt in donning L sock however pt requires Max A d/t poor dynamic sitting balance and reaching; upon standing pt pants fell to floor and pt attempted to reach for pants demonstrating poor safety awareness and poor standing balance requiring Max A to pull up over hips)  Toileting: Maximum assistance;Setup; Increased time to complete  Tone RUE  RUE Tone: Normotonic  Tone LUE  LUE Tone: Hypotonic  Coordination  Movements Are Fluid And Coordinated: No  Coordination and Movement description: Left UE;Fine motor impairments;Gross motor impairments    Bed mobility  Supine to Sit: Moderate assistance (Mod A for trunk progression)  Scooting: Contact guard assistance  Comment: HOB elevated ~20 degrees throughout bed mobility maneuvers. Pt utilized bed rail appropriately. 2 VCs for initiation of task provided. Pt retired to recliner at end of session  Transfers  Sit to stand: Minimal assistance;2 Person assistance  Stand to sit: Minimal assistance;2 Person assistance  Transfer Comments: hand over hand placement for LUE onto RW; Max VCs to push from bed using RUE    Cognition  Overall Cognitive Status: Exceptions  Following Commands: Follows multistep commands with increased time; Follows multistep commands with repitition  Attention Span: Attends with cues to redirect  Safety Judgement: Decreased awareness of need for assistance  Problem Solving: Assistance required to identify errors made;Assistance required to correct errors made  Initiation: Requires cues for some  Sequencing: Requires cues for some       Sensation  Overall Sensation Status: Impaired (pt reports numbness and tingling to LUE/LLE however able to state where tactile stimulation is appropriately with vision occluded)        LUE PROM (degrees)  LUE PROM: WFL  LUE AROM (degrees)  LUE AROM : Exceptions  L Shoulder Flexion 0-180: 0-60  L Elbow Flexion 0-145: 0-130  L Wrist Flexion 0-80: 0-30  L Wrist Extension 0-70: able to extend wrist from slightly flexed position to 0 degrees  Left Hand PROM (degrees)  Left Hand PROM: WFL  Left Hand AROM (degrees)  Left Hand AROM: Exceptions  Left Hand General AROM: able to make loose composite fist; no active digit extension  RUE AROM (degrees)  RUE AROM : WFL  Right Hand AROM (degrees)  Right Hand AROM: WFL  LUE Strength  Gross LUE Strength: Exceptions to [facility] Shoulder Flex: 2+/5  L Shoulder Ext: 2+/5  L Elbow Flex: 3-/5  L Elbow Ext: 3-/5  L Wrist Flex: 2-/5  L Wrist Ext: 2-/5  L Hand General: 2-/5  RUE Strength  Gross RUE Strength: WFL  R Hand General: 4+/5  RUE Strength Comment: grossly 4+/5                   Plan   Plan  Times per week: 4-5 x/wk  Current Treatment Recommendations: Strengthening, ROM, Balance Training, Functional Mobility Training, Neuromuscular Re-education, Safety Education & Training, Patient/Caregiver Education & Training, Equipment Evaluation, Education, & procurement, Positioning, Self-Care / ADL, Home Management Training, Cognitive/Perceptual Training    AM-PAC Score  AM-[facility] Inpatient Daily Activity Raw Score: 14 (10/15/21 1222)  AM-PAC Inpatient ADL T-Scale Score : 33.39 (10/15/21 1222)  ADL Inpatient CMS 0-100% Score: 59.67 (10/15/21 1222)  ADL Inpatient CMS G-Code Modifier : CK (10/15/21 1222)    Goals  Short term goals  Time Frame for Short term goals: By discharge, pt will:  Short term goal 1: Demo I with self PROM/AAROM and proper positioning of LUE to increase functional use for ADL participation  Short term goal 2: Demo CGA for UB ADLs and grooming tasks with setup and adaptive techs  Short term goal 3: Demo Mod A for LB ADLs and toileting tasks with setup, AE PRN, and adaptive techs  Short term goal 4: Demo I with bed mobility to increase independence with ADLs/IADLs and decrease risk for pressure injury  Short term goal 5: Demo Min A for functional transfers and functional mobility with use of LRD  Short term goal 6: Demo WB through LUE for 5+ minutes during functional task to promote neuromuscular reeducation       Therapy Time   Individual Concurrent Group Co-treatment   Time In 0912          Time Out 0952         Minutes 40 + 8 min (provided yellow (light resistance) foam cube and education)         Timed Code Treatment Minutes: 23 Minutes       Lianne Espino OTR/L

## 2021-10-15 NOTE — PROGRESS NOTES
[facility]   [address] [place] [address]    Progress Note             Date:   10/15/2021  Patient name:  Leah Campos  Date of admission:  10/14/2021  4:07 PM  MRN:   [id number]  Account:  [de-identified]  YOB: 1965  PCP:    No primary care provider on file. Room:   [address] [place] [address]  Code Status:    Full Code    Chief Complaint:     Chief Complaint   Patient presents with    Cerebrovascular Accident       Interval hx: The patient was seen and examined at bedside. Is vitally stable, alert and oriented x 4. No acute events overnight. The patient states he slept well overnight. Stated that he continues to feel weak on his left side, states his symptoms have not improved since arrival, possibly slightly worsened. Son present at bedside, states he also feels that the patient's weakness has somewhat worsened. MRI brain and echocardiogram pending. Brief History of Present Illness: The patient is a 60 y.o. male with past medical history of hypertension, chronic smoker, who presents with Left upper and lower extremity weakness and numbness. Last known well 6 AM.  The patient is not on blood thinners. Initial NIHSS of 2. Initial systolic blood pressure was more than 200. Blood sugar within normal meds. The patient is working the third shift, he said that around 6 AM he started to feel abnormal, then followed by left-sided weakness. He denied double vision, change in speech, facial droop, right-sided weakness, seizures or loss of consciousness, dizziness, headaches, nausea or vomiting, change in bowel or urinary habits. Neuro exam revealed left upper extremity drift with no weakness, mild decrease in sensation in the left upper and lower extremity.   Will get stat CT head along with stat CT of the neck with contrast.    Past Medical History:     Past Medical History:   Diagnosis Date    Hypertension     Tobacco abuse         Past Surgical History:     No past surgical history on file. Medications Prior to Admission:     Prior to Admission medications    Medication Sig Start Date End Date Taking? Authorizing Provider   albuterol sulfate HFA (VENTOLIN HFA) 108 (90 Base) MCG/ACT inhaler Inhale 2 puffs into the lungs every 6 hours as needed for Wheezing 6/2/21  Yes CHRISTAL López - CNP   loratadine (CLARITIN) 10 MG tablet Take 10 mg by mouth daily. Yes Historical Provider, MD   lisinopril (PRINIVIL;ZESTRIL) 10 MG tablet Take 1 tablet by mouth daily 12/30/17   Tara Robledo, Gonzalo18 Junito Hubbard   Chlorpheniramine-PSE-Ibuprofen (ADVIL ALLERGY SINUS PO) Take  by mouth. Historical Provider, MD   ibuprofen (IBU) 800 MG tablet Take 1 tablet by mouth every 8 hours as needed for Pain. 9/27/13   JAMIL Julian        Allergies:     Penicillins    Social History:     Tobacco:    reports that he has been smoking cigarettes. He has a 30.00 pack-year smoking history. He has never used smokeless tobacco.  Alcohol:      reports current alcohol use. Drug Use:  reports no history of drug use. Family History:     No family history on file. Review of Systems:     Review of Systems   Constitutional: Negative for activity change, appetite change, chills, diaphoresis and fever. Respiratory: Negative for apnea, cough, chest tightness, shortness of breath and wheezing. Cardiovascular: Negative for chest pain and palpitations. Gastrointestinal: Negative for abdominal distention, abdominal pain, constipation, diarrhea, nausea and vomiting. Genitourinary: Negative for difficulty urinating, dysuria and frequency. Musculoskeletal: Negative for arthralgias and myalgias. Neurological: Positive for weakness (Left-sided). Negative for dizziness, light-headedness and headaches. Psychiatric/Behavioral: Negative for agitation and confusion. All other systems reviewed and are negative.       Physical Exam:   BP (!) 150/96   Pulse 87   Temp 98.3 °F (36.8 °C) (Oral)   Resp 17   Ht 5' 9.5\" (1.765 m)   Wt 190 lb (86.2 kg)   SpO2 95%   BMI 27.66 kg/m²   Temp (24hrs), Av.4 °F (36.9 °C), Min:98.1 °F (36.7 °C), Max:99 °F (37.2 °C)    Recent Labs     10/14/21  1619 10/14/21  2104 10/15/21  0837   POCGLU 202* 148* 187*     No intake or output data in the 24 hours ending 10/15/21 0933      Neurologic Exam     GENERAL  Appears comfortable and in no distress   HEENT  NC/ AT   HEART  S1 and S2 heard; palpation of pulses: radial pulse    NECK  Supple and no bruits heard   MENTAL STATUS:  Alert, oriented, intact memory, no confusion, normal speech, normal language, no hallucination or delusion   CRANIAL NERVES: II     -      Visual fields intact to confrontation  III,IV,VI -  PERR, EOMs full, no ptosis  V     -     Normal facial sensation   VII    -     Normal facial symmetry  VIII   -     Intact hearing   IX,X -     Symmetrical palate  XI    -     Symmetrical shoulder shrug  XII   -     Midline tongue, no atrophy    MOTOR FUNCTION: RUE: Significant for good strength of grade 5/5 in proximal and distal muscle groups   LUE: Significant for good strength of grade 3/5 in proximal and distal muscle groups   RLE: Significant for good strength of grade 5/5 in proximal and distal muscle groups   LLE: Significant for good strength of grade 5/5 in proximal and 1/5 in distal muscle groups      Normal bulk, normal tone and no involuntary movements, no tremor   SENSORY FUNCTION:  Normal touch, normal pinprick, normal vibration, normal proprioception   CEREBELLAR FUNCTION:  Intact fine motor control over upper limbs and lower limbs   REFLEX FUNCTION:  Symmetric in upper and lower extremities, no Babinski sign   STATION and GAIT  Normal gait and tandem station, normal tip toes and heel walking       Investigations:      Laboratory Testing:  Recent Results (from the past 24 hour(s))   STROKE PANEL    Collection Time: 10/14/21  4:17 PM   Result Value Ref Range    Glucose 184 (H) 70 - 99 mg/dL    BUN 8 6 - 20 mg/dL CREATININE 0.81 0.70 - 1.20 mg/dL    Bun/Cre Ratio NOT REPORTED 9 - 20    Calcium 9.2 8.6 - 10.4 mg/dL    Sodium 133 (L) 135 - 144 mmol/L    Potassium 4.1 3.7 - 5.3 mmol/L    Chloride 97 (L) 98 - 107 mmol/L    CO2 19 (L) 20 - 31 mmol/L    Anion Gap 17 9 - 17 mmol/L    GFR Non-African American >60 >60 mL/min    GFR African American >60 >60 mL/min    GFR Comment          GFR Staging NOT REPORTED     WBC 9.4 3.5 - 11.3 k/uL    RBC 5.01 4.21 - 5.77 m/uL    Hemoglobin 15.6 13.0 - 17.0 g/dL    Hematocrit 46.5 40.7 - 50.3 %    MCV 92.8 82.6 - 102.9 fL    MCH 31.1 25.2 - 33.5 pg    MCHC 33.5 28.4 - 34.8 g/dL    RDW 12.4 11.8 - 14.4 %    Platelets 989 423 - 633 k/uL    MPV 9.1 8.1 - 13.5 fL    NRBC Automated 0.0 0.0 per 100 WBC    Total  39 - 308 U/L    CK-MB 2.0 <10.5 ng/mL    % CKMB 1.5 0.0 - 3.5 %    CKMB Interpretation NORMAL ISOENZYME PATTERN     Differential Type NOT REPORTED     Seg Neutrophils 77 (H) 36 - 65 %    Lymphocytes 14 (L) 24 - 43 %    Monocytes 7 3 - 12 %    Eosinophils % 1 1 - 4 %    Basophils 1 0 - 2 %    Immature Granulocytes 0 0 %    Segs Absolute 7.24 1.50 - 8.10 k/uL    Absolute Lymph # 1.27 1.10 - 3.70 k/uL    Absolute Mono # 0.66 0.10 - 1.20 k/uL    Absolute Eos # 0.07 0.00 - 0.44 k/uL    Basophils Absolute 0.08 0.00 - 0.20 k/uL    Absolute Immature Granulocyte 0.03 0.00 - 0.30 k/uL    WBC Morphology NOT REPORTED     RBC Morphology NOT REPORTED     Platelet Estimate NOT REPORTED     Myoglobin 69 28 - 72 ng/mL    Protime 10.3 9.1 - 12.3 sec    INR 1.0     PTT 24.2 20.5 - 30.5 sec    Troponin, High Sensitivity 8 0 - 22 ng/L    Troponin T NOT REPORTED <0.03 ng/mL    Troponin Interp NOT REPORTED    Venous Blood Gas, POC    Collection Time: 10/14/21  4:19 PM   Result Value Ref Range    pH, Elias 7.442 (H) 7.320 - 7.430    pCO2, Elias 34.1 (L) 41.0 - 51.0 mm Hg    pO2, Elias 38.6 30 - 50 mm Hg    HCO3, Venous 23.3 22.0 - 29.0 mmol/L    Total CO2, Venous NOT REPORTED 23.0 - 30.0 mmol/L    Negative Base Excess, Elias NOT REPORTED 0.0 - 2.0    Positive Base Excess, Elias 0 0.0 - 3.0    O2 Sat, Elias 76 60.0 - 85.0 %    O2 Device/Flow/% NOT REPORTED     Dhruv Test NOT REPORTED     Sample Site NOT REPORTED     Mode NOT REPORTED     FIO2 NOT REPORTED     Pt Temp NOT REPORTED     POC pH Temp NOT REPORTED     POC pCO2 Temp NOT REPORTED mm Hg    POC pO2 Temp NOT REPORTED mm Hg   ELECTROLYTES PLUS    Collection Time: 10/14/21  4:19 PM   Result Value Ref Range    POC Sodium 137 (L) 138 - 146 mmol/L    POC Potassium 4.0 3.5 - 4.5 mmol/L    POC Chloride 103 98 - 107 mmol/L    POC TCO2 24 22 - 30 mmol/L    Anion Gap 11 7 - 16 mmol/L   Hemoglobin and hematocrit, blood    Collection Time: 10/14/21  4:19 PM   Result Value Ref Range    POC Hemoglobin 15.9 13.5 - 17.5 g/dL    POC Hematocrit 47 41 - 53 %   Creatinine W/GFR Point of Care    Collection Time: 10/14/21  4:19 PM   Result Value Ref Range    POC Creatinine 0.73 0.51 - 1.19 mg/dL    GFR Comment >60 >60 mL/min    GFR Non-African American >60 >60 mL/min    GFR Comment         CALCIUM, IONIC (POC)    Collection Time: 10/14/21  4:19 PM   Result Value Ref Range    POC Ionized Calcium 1.02 (L) 1.15 - 1.33 mmol/L   POCT urea (BUN)    Collection Time: 10/14/21  4:19 PM   Result Value Ref Range    POC BUN 8 8 - 26 mg/dL   Lactic Acid, POC    Collection Time: 10/14/21  4:19 PM   Result Value Ref Range    POC Lactic Acid 1.68 (H) 0.56 - 1.39 mmol/L   POCT Glucose    Collection Time: 10/14/21  4:19 PM   Result Value Ref Range    POC Glucose 202 (H) 74 - 100 mg/dL   COVID-19, Rapid    Collection Time: 10/14/21  4:33 PM    Specimen: Nasopharyngeal Swab   Result Value Ref Range    Specimen Description . NASOPHARYNGEAL SWAB     SARS-CoV-2, Rapid Not Detected Not Detected   EKG 12 Lead    Collection Time: 10/14/21  5:04 PM   Result Value Ref Range    Ventricular Rate 88 BPM    Atrial Rate 88 BPM    P-R Interval 166 ms    QRS Duration 102 ms    Q-T Interval 388 ms    QTc Calculation (Bazett) 469 ms P Axis 48 degrees    R Axis 58 degrees    T Axis 40 degrees   DRUG SCREEN MULTI URINE    Collection Time: 10/14/21  5:16 PM   Result Value Ref Range    Amphetamine Screen, Ur NEGATIVE NEGATIVE    Barbiturate Screen, Ur NEGATIVE NEGATIVE    Benzodiazepine Screen, Urine NEGATIVE NEGATIVE    Cocaine Metabolite, Urine NEGATIVE NEGATIVE    Methadone Screen, Urine NEGATIVE NEGATIVE    Opiates, Urine NEGATIVE NEGATIVE    Phencyclidine, Urine NEGATIVE NEGATIVE    Propoxyphene, Urine NOT REPORTED NEGATIVE    Cannabinoid Scrn, Ur NEGATIVE NEGATIVE    Oxycodone Screen, Ur NEGATIVE NEGATIVE    Methamphetamine, Urine NOT REPORTED NEGATIVE    Tricyclic Antidepressants, Urine NOT REPORTED NEGATIVE    MDMA, Urine NOT REPORTED NEGATIVE    Buprenorphine Urine NOT REPORTED NEGATIVE    Test Information       Assay provides medical screening only. The absence of expected drug(s) and/or metabolite(s) may indicate diluted or adulterated urine, limitations of testing or timing of collection.    POC Glucose Fingerstick    Collection Time: 10/14/21  9:04 PM   Result Value Ref Range    POC Glucose 148 (H) 75 - 110 mg/dL   CBC    Collection Time: 10/15/21  5:12 AM   Result Value Ref Range    WBC 5.9 3.5 - 11.3 k/uL    RBC 4.98 4.21 - 5.77 m/uL    Hemoglobin 15.5 13.0 - 17.0 g/dL    Hematocrit 49.4 40.7 - 50.3 %    MCV 99.2 82.6 - 102.9 fL    MCH 31.1 25.2 - 33.5 pg    MCHC 31.4 28.4 - 34.8 g/dL    RDW 12.8 11.8 - 14.4 %    Platelets 030 028 - 674 k/uL    MPV 9.5 8.1 - 13.5 fL    NRBC Automated 0.0 0.0 per 100 WBC   Basic Metabolic Panel w/ Reflex to MG    Collection Time: 10/15/21  5:12 AM   Result Value Ref Range    Glucose 186 (H) 70 - 99 mg/dL    BUN 7 6 - 20 mg/dL    CREATININE 0.72 0.70 - 1.20 mg/dL    Bun/Cre Ratio NOT REPORTED 9 - 20    Calcium 8.9 8.6 - 10.4 mg/dL    Sodium 131 (L) 135 - 144 mmol/L    Potassium 4.4 3.7 - 5.3 mmol/L    Chloride 102 98 - 107 mmol/L    CO2 17 (L) 20 - 31 mmol/L    Anion Gap 12 9 - 17 mmol/L    GFR Non-African American >60 >60 mL/min    GFR African American >60 >60 mL/min    GFR Comment          GFR Staging NOT REPORTED    Lipid Panel    Collection Time: 10/15/21  5:12 AM   Result Value Ref Range    Cholesterol 151 <200 mg/dL    HDL 28 (L) >40 mg/dL    LDL Cholesterol 97 0 - 130 mg/dL    Chol/HDL Ratio 5.4 (H) <5    Triglycerides 128 <150 mg/dL    VLDL NOT REPORTED 1 - 30 mg/dL   POC Glucose Fingerstick    Collection Time: 10/15/21  8:37 AM   Result Value Ref Range    POC Glucose 187 (H) 75 - 110 mg/dL     Recent Labs     10/15/21  0512   WBC 5.9   RBC 4.98   HGB 15.5   HCT 49.4   MCV 99.2   MCH 31.1   MCHC 31.4   RDW 12.8      MPV 9.5     Recent Labs     10/15/21  0512   *   K 4.4      CO2 17*   BUN 7   CREATININE 0.72   GLUCOSE 186*   CALCIUM 8.9     No results found for: LABA1C    Assessment :      Primary Problem  <principal problem not specified>    Active Hospital Problems    Diagnosis Date Noted    Stroke-like symptom [R29.90] 10/14/2021       Patient is a 60 y.o. male presented with left upper and lower extremity weakness, foot drop. Patient presented as a stroke alert, NIH 2 at presentation. Not a candidate for TPA, CT head unremarkable, CTA head and neck showed focal calcification of the origin of the left vertebral artery. MRI brain and echocardiogram pending. Stroke work-up ongoing. Symptoms slightly worsened. Loaded with aspirin and Plavix. 1. TIA/stroke    Plan:     · MRI brain without contrast pending  · Echocardiogram pending  · Continue aspirin 81 mg and Plavix 75 mg daily, loaded with aspirin and Plavix  · Continue Lipitor 80 mg nightly  · LDL 97, HbA1c 7.7  · PT/OT/ST  · PMNR consulted for acute rehab placement  · SBP goal less than 220   · Discharge planning ongoing   · Lovenox for DVT prophylaxis      Follow-up further recommendations after discussing the case with attending  The plan was discussed with the patient, patient's family and the medical staff. Consultations:   IP CONSULT TO STROKE TEAM    Patient is admitted as inpatient status because of co-morbidities listed above, severity of signs and symptoms as outlined, requirement for current medical therapies and most importantly because of direct risk to patient if care not provided in a hospital setting. Yash Groves MD  10/15/2021  9:33 AM    Copy sent to Dr. Collins primary care provider on file.

## 2021-10-15 NOTE — CONSULTS
Physical Medicine & Rehabilitation  Consult Note      Admitting Physician:  Shirley Sanchez MD    Primary Care Provider:  No primary care provider on file. Reason for Consult:  Acute Inpatient Rehabilitation    Chief Complaint:  Left-sided weakness    History of Present Illness:  Referring Provider is requesting an evaluation for appropriate placement upon discharge from acute care. History from chart review and patient. Maria A Muse is a 60 y.o. male with history of HTN, tobacco use admitted to [facility] on 10/14/2021. He initially presented with left-sided weakness. NIHSS 2 and systolic BP was found to be greater than 200. CT head showed no acute intracranial abnormality. He was loaded with aspirin and plavix. MRI brain showed acute/subacute ischemia in the right basal ganglia region. He currently reports continued weakness in the left upper greater than the left lower limbs. He notes intermittent minimal abdominal pain. He denies any other acute concerns, including headaches, changes in vision, numbness/tingling, and changes in bladder or bowel control. Review of Systems:  Review of Systems   Constitutional: Negative for fever. HENT: Negative for hearing loss. Eyes: Negative for blurred vision and double vision. Respiratory: Negative for shortness of breath. Cardiovascular: Negative for chest pain. Gastrointestinal: Positive for abdominal pain (minimal, intermittent). No change in bowel control   Genitourinary:        No change in bladder control   Musculoskeletal: Negative for back pain. Skin: Negative for rash. Neurological: Positive for focal weakness. Negative for sensory change and headaches.       Premorbid function:  Independent    Current function:    PT:  Restrictions/Precautions: Fall Risk, Up as Tolerated  Other position/activity restrictions: L side deficits, SBP <220   Transfers  Sit to Stand: Minimal Assistance, 2 Person Assistance  Stand to sit: Maximum 5ft  Comments: ~8 minutes      OT:   ADL  Feeding: Minimal assistance, Setup, Increased time to complete, Scoop assist  Grooming: Minimal assistance, Setup, Verbal cueing, Increased time to complete  UE Bathing: Moderate assistance, Setup, Verbal cueing, Increased time to complete  LE Bathing: Maximum assistance, Setup, Verbal cueing, Increased time to complete  UE Dressing: Moderate assistance, Setup, Verbal cueing, Increased time to complete  LE Dressing: Maximum assistance, Setup, Verbal cueing, Increased time to complete (OT facilitated pt in donning L sock however pt requires Max A d/t poor dynamic sitting balance and reaching; upon standing pt pants fell to floor and pt attempted to reach for pants demonstrating poor safety awareness and poor standing balance requiring)  Toileting: Maximum assistance, Setup, Increased time to complete         Balance  Sitting Balance: Contact guard assistance (Mod A with dynamic sitting/reaching d/t L side deficits/L lean. Total sitting time ~20 minutes. Pt demo weightbearing through L elbow for ~1 min during dynamic sitting tasks to promote neuromuscular reeducation)  Standing Balance: Maximum assistance   Standing Balance  Time: ~6 minutes  Activity: throughout functional mobility from EOB > recliner; pt requiring multiple standing rest breaks for placement of L hand/LLE and education throughout  Functional Mobility  Functional - Mobility Device: Rolling Walker  Activity: Other  Assist Level: Maximum assistance (X2)  Functional Mobility Comments: Pt completed functional mobility from EOB > recliner requiring Max A X2 throughout d/t L side deficits. Pt required constant LUE support on RW with elbow support to lock into extension. Pt required assistance to lock L knee throughout 50% of functional mobility/standing rest breaks d/t significant buckling from weakness.  Pt educated on step-by-step instructions for performing functional mobility however continuing to require VCs/TCs injection 5 mg, 5 mg, IntraVENous, Q4H PRN  insulin lispro (HUMALOG) injection vial 0-6 Units, 0-6 Units, SubCUTAneous, TID WC  insulin lispro (HUMALOG) injection vial 0-3 Units, 0-3 Units, SubCUTAneous, Nightly    Family History:   No family history on file. Social History:  Lives With: Son, Daughter (2 adult children and 17 month old grandchild)  Type of Home: House  Home Layout: Two level, Performs ADL's on one level, Able to Live on Main level with bedroom/bathroom  Home Access: Stairs to enter without rails  Entrance Stairs - Number of Steps: 2  Bathroom Shower/Tub: Tub/Shower unit  Bathroom Toilet: Standard  Home Equipment:  (no DME)  ADL Assistance: Independent  Homemaking Assistance: Independent  Homemaking Responsibilities: Yes  Ambulation Assistance: Independent  Transfer Assistance: Independent  Active : Yes  Mode of Transportation: Car  Occupation: Full time employment  Type of occupation:   [employer]: golf, motorcycle  Additional Comments: Pt reports family is able to provide assistance PRN, children work outside the home.   Social History     Socioeconomic History    Marital status:      Spouse name: Not on file    Number of children: Not on file    Years of education: Not on file    Highest education level: Not on file   Occupational History    Not on file   Tobacco Use    Smoking status: Current Every Day Smoker     Packs/day: 1.00     Years: 30.00     Pack years: 30.00     Types: Cigarettes    Smokeless tobacco: Never Used   Substance and Sexual Activity    Alcohol use: Yes     Comment: moderately    Drug use: No    Sexual activity: Not on file   Other Topics Concern    Not on file   Social History Narrative    Not on file     Social Determinants of Health     Financial Resource Strain:     Difficulty of Paying Living Expenses:    Food Insecurity:     Worried About Running Out of Food in the Last Year:     [address] in the Last Year: Transportation Needs:     Lack of Transportation (Medical):  Lack of Transportation (Non-Medical):    Physical Activity:     Days of Exercise per Week:     Minutes of Exercise per Session:    Stress:     Feeling of Stress :    Social Connections:     Frequency of Communication with Friends and Family:     Frequency of Social Gatherings with Friends and Family:     Attends Jainism Services:     Active Member of Clubs or Organizations:     Attends Club or Organization Meetings:     Marital Status:    Intimate Partner Violence:     Fear of Current or Ex-Partner:     Emotionally Abused:     Physically Abused:     Sexually Abused:        Physical Exam:  /86   Pulse 85   Temp 98 °F (36.7 °C) (Oral)   Resp 18   Ht 5' 9.5\" (1.765 m)   Wt 190 lb (86.2 kg)   SpO2 95%   BMI 27.66 kg/m²     GEN: Well developed, well nourished, no acute distress  HEENT: NCAT. EOMI. Hearing grossly intact. Mucous membranes pink and moist.  RESP: Normal breath sounds with no wheezing, rales, or rhonchi. Respirations WNL and unlabored. CV: Regular rate and rhythm. No murmurs, rubs, or gallops. ABD: Soft, non-distended, BS+ and equal.  NEURO: Alert. Speech fluent. No facial droop. Asymmetrical shoulder shrug with right greater than left. Midline tongue protrusion. Sensation to light touch intact. MSK:  Muscle bulk is normal bilaterally. Strength 2/5 with left hip flexion and 1/5 with left hip adduction. Strength 5/5 in right upper limb. Strength 5/5 with right ankle dorsiflexion and plantarflexion. LIMBS: No edema in bilateral lower limbs. SKIN: Warm and dry with good turgor. PSYCH: Mood WNL. Affect WNL. Appropriately interactive.     Diagnostics:    CBC:   Recent Labs     10/14/21  1617 10/15/21  0512   WBC 9.4 5.9   RBC 5.01 4.98   HGB 15.6 15.5   HCT 46.5 49.4   MCV 92.8 99.2   RDW 12.4 12.8    215     BMP:   Recent Labs     10/14/21  1617 10/14/21  1619 10/15/21  0512   *  --  131* K 4.1  --  4.4   CL 97*  --  102   CO2 19*  --  17*   BUN 8  --  7   CREATININE 0.81 0.73 0.72   GLUCOSE 184*  --  186*      HbA1c:   Lab Results   Component Value Date    LABA1C 7.7 (H) 10/15/2021     BNP: No results for input(s): BNP in the last 72 hours. PT/INR:   Recent Labs     10/14/21  1617   PROTIME 10.3   INR 1.0     APTT:   Recent Labs     10/14/21  1617   APTT 24.2     CARDIAC ENZYMES:   Recent Labs     10/14/21  1617   CKMB 2.0   TROPONINT NOT REPORTED     FASTING LIPID PANEL:  Lab Results   Component Value Date    CHOL 151 10/15/2021    HDL 28 (L) 10/15/2021    TRIG 128 10/15/2021     LIVER PROFILE: No results for input(s): AST, ALT, ALB, BILIDIR, BILITOT, ALKPHOS in the last 72 hours. Radiology:  MRI brain without contrast   Final Result   Acute/subacute ischemia in the right basal ganglia region         XR CHEST PORTABLE   Final Result   Negative portable chest.         CTA HEAD NECK W CONTRAST   Final Result   Focal calcification origin left vertebral artery. No significant stenosis. Otherwise unremarkable CTA of the head and neck. CT HEAD WO CONTRAST   Final Result   No acute intracranial abnormality. RECOMMENDATIONS:   The findings were sent to the Radiology Results [address] at 4:40   pm on 10/14/2021to be communicated to a licensed caregiver. Impression:    1. Right basal ganglia CVA  2. Left hemiparesis  3. Hyponatremia  4. HTN  5. Tobacco use    Recommendations:    1. Diagnosis:  Right basal ganglia CVA  2. Therapy: Has PT/OT needs  3. Medical Necessity: As above  4. Support: Lives with son, daughter  11. Rehab Recommendation:  The patient will benefit from acute inpatient rehabilitation once medically stable per primary service. Anticipate he will be able to tolerate 3 hours of therapy per day in rehabilitation.  The patient requires multidisciplinary rehabilitation treatment including medical management by a PM&R physician, 24 hour rehabilitation nursing, physical therapy, occupational therapy, rehabilitation social work, and nutrition services. Patient and family also require education in post-hospital precautions and home exercise routine, adaptive techniques and deficit compensation strategies, strengthening and conditioning, equipment prescription and instructions in use. 6. DVT Prophylaxis: Lovenox    It was my pleasure to evaluate Abhijit Barraza today. Please call with questions.     Byron Aguilera MD

## 2021-10-15 NOTE — PROGRESS NOTES
Speech Language Pathology  Facility/Department: [facility] NEURO  Initial Speech/Language/Cognitive Assessment    NAME: Jaguar Zhang  : 1965   MRN: [id number]  ADMISSION DATE: 10/14/2021  ADMITTING DIAGNOSIS: has Tobacco abuse and Stroke-like symptom on their problem list.      Date of Eval: 10/15/2021   Evaluating Therapist: Ed Puckett      Primary Complaint: The patient is a 60 y.o. male with past medical history of hypertension, chronic smoker, who presents with Left upper and lower extremity weakness and numbness. Last known well 6 AM.  The patient is not on blood thinners. Initial NIHSS of 2. Initial systolic blood pressure was more than 200. Blood sugar within normal meds. The patient is working the third shift, he said that around 6 AM he started to feel abnormal, then followed by left-sided weakness. He denied double vision, change in speech, facial droop, right-sided weakness, seizures or loss of consciousness, dizziness, headaches, nausea or vomiting, change in bowel or urinary habits. Neuro exam revealed left upper extremity drift with no weakness, mild decrease in sensation in the left upper and lower extremity. Will get stat CT head along with stat CT of the neck with contrast.    Pain:  Pain Assessment  Pain Assessment: 0-10  Pain Level: 0    Assessment:    Pt presents with no apparent cognitive deficits at this time. No dysarthria noted, no oral motor deficits. No further ST is recommended. Verbal education provided.        Recommendations:  Requires SLP Intervention: No     D/C Recommendations: Home independently       Subjective:  General  Chart Reviewed: Yes  Family / Caregiver Present: Yes  Social/Functional History  Lives With: Family  Active : Yes  Occupation: Other(comment) (pt. reports he works for [employer])  Vision  Vision: Within Functional Limits  Hearing  Hearing: Within functional limits           Objective:     Oral/Motor  Oral Motor: Exceptions to [facility] (pt. reports improvement in facial weakness)  Labial Symmetry: Abnormal symmetry left    Expression  Primary Mode of Expression: Verbal    Verbal Expression  Verbal Expression: Within functional limits       Motor Speech  Motor Speech:  Within Functional Limits       Cognition:      Orientation  Overall Orientation Status: Within Normal Limits  Attention  Attention: Within Functional Limits  Memory  Memory: Within Funtional Limits  Problem Solving  Problem Solving: Within Functional Limits  Safety/Judgement  Safety/Judgement: Within Functional Limits   Word Generation: WFL   Word Associations: WFL   Verbal Sequencing: WFL    Prognosis:  Speech Therapy Prognosis  Prognosis: Good  Individuals consulted  Consulted and agree with results and recommendations: Patient    Education:  Patient Education: yes  Patient Education Response: Verbalizes understanding          Therapy Time:   Individual Concurrent Group Co-treatment   Time In 1005         Time Out 1020         Minutes 15               Completed by Agusto Bates,  Clinician    Co-signed by  Sorin Gonzáles M.S., CCC/SLP      10/15/2021 11:59 AM

## 2021-10-15 NOTE — PLAN OF CARE
Problem: Skin Integrity:  Goal: Will show no infection signs and symptoms  Description: Will show no infection signs and symptoms  10/15/2021 1733 by Ivan Elaine RN  Outcome: Ongoing     Problem: Skin Integrity:  Goal: Absence of new skin breakdown  Description: Absence of new skin breakdown  10/15/2021 1733 by Ivan Elaine RN  Outcome: Ongoing     Problem: Falls - Risk of:  Goal: Will remain free from falls  Description: Will remain free from falls  10/15/2021 1733 by Ivan Elaine RN  Outcome: Ongoing     Problem: Falls - Risk of:  Goal: Absence of physical injury  Description: Absence of physical injury  10/15/2021 1733 by Ivan Elaine RN  Outcome: Ongoing     Problem: HEMODYNAMIC STATUS  Goal: Patient has stable vital signs and fluid balance  10/15/2021 1733 by Ivan Elaine RN  Outcome: Ongoing     Problem: ACTIVITY INTOLERANCE/IMPAIRED MOBILITY  Goal: Mobility/activity is maintained at optimum level for patient  10/15/2021 1733 by Ivan Elaine RN  Outcome: Ongoing

## 2021-10-16 LAB
ABSOLUTE EOS #: 0.06 K/UL (ref 0–0.44)
ABSOLUTE IMMATURE GRANULOCYTE: <0.03 K/UL (ref 0–0.3)
ABSOLUTE LYMPH #: 1.39 K/UL (ref 1.1–3.7)
ABSOLUTE MONO #: 0.39 K/UL (ref 0.1–1.2)
ANION GAP SERPL CALCULATED.3IONS-SCNC: 10 MMOL/L (ref 9–17)
BASOPHILS # BLD: 1 % (ref 0–2)
BASOPHILS ABSOLUTE: 0.05 K/UL (ref 0–0.2)
BUN BLDV-MCNC: 11 MG/DL (ref 6–20)
BUN/CREAT BLD: ABNORMAL (ref 9–20)
CALCIUM SERPL-MCNC: 9 MG/DL (ref 8.6–10.4)
CHLORIDE BLD-SCNC: 102 MMOL/L (ref 98–107)
CO2: 23 MMOL/L (ref 20–31)
CREAT SERPL-MCNC: 0.77 MG/DL (ref 0.7–1.2)
DIFFERENTIAL TYPE: NORMAL
EKG ATRIAL RATE: 60 BPM
EKG P AXIS: 42 DEGREES
EKG P-R INTERVAL: 142 MS
EKG Q-T INTERVAL: 430 MS
EKG QRS DURATION: 100 MS
EKG QTC CALCULATION (BAZETT): 430 MS
EKG R AXIS: 69 DEGREES
EKG T AXIS: 53 DEGREES
EKG VENTRICULAR RATE: 60 BPM
EOSINOPHILS RELATIVE PERCENT: 1 % (ref 1–4)
GFR AFRICAN AMERICAN: >60 ML/MIN
GFR NON-AFRICAN AMERICAN: >60 ML/MIN
GFR SERPL CREATININE-BSD FRML MDRD: ABNORMAL ML/MIN/{1.73_M2}
GFR SERPL CREATININE-BSD FRML MDRD: ABNORMAL ML/MIN/{1.73_M2}
GLUCOSE BLD-MCNC: 133 MG/DL (ref 75–110)
GLUCOSE BLD-MCNC: 138 MG/DL (ref 70–99)
GLUCOSE BLD-MCNC: 140 MG/DL (ref 75–110)
GLUCOSE BLD-MCNC: 144 MG/DL (ref 75–110)
GLUCOSE BLD-MCNC: 156 MG/DL (ref 75–110)
HCT VFR BLD CALC: 48.5 % (ref 40.7–50.3)
HEMOGLOBIN: 15.8 G/DL (ref 13–17)
IMMATURE GRANULOCYTES: 0 %
LYMPHOCYTES # BLD: 28 % (ref 24–43)
MCH RBC QN AUTO: 30.7 PG (ref 25.2–33.5)
MCHC RBC AUTO-ENTMCNC: 32.6 G/DL (ref 28.4–34.8)
MCV RBC AUTO: 94.2 FL (ref 82.6–102.9)
MONOCYTES # BLD: 8 % (ref 3–12)
NRBC AUTOMATED: 0 PER 100 WBC
PDW BLD-RTO: 12.4 % (ref 11.8–14.4)
PLATELET # BLD: 206 K/UL (ref 138–453)
PLATELET ESTIMATE: NORMAL
PMV BLD AUTO: 9.2 FL (ref 8.1–13.5)
POTASSIUM SERPL-SCNC: 4.3 MMOL/L (ref 3.7–5.3)
RBC # BLD: 5.15 M/UL (ref 4.21–5.77)
RBC # BLD: NORMAL 10*6/UL
SEG NEUTROPHILS: 62 % (ref 36–65)
SEGMENTED NEUTROPHILS ABSOLUTE COUNT: 3.05 K/UL (ref 1.5–8.1)
SODIUM BLD-SCNC: 135 MMOL/L (ref 135–144)
WBC # BLD: 5 K/UL (ref 3.5–11.3)
WBC # BLD: NORMAL 10*3/UL

## 2021-10-16 PROCEDURE — 6370000000 HC RX 637 (ALT 250 FOR IP): Performed by: STUDENT IN AN ORGANIZED HEALTH CARE EDUCATION/TRAINING PROGRAM

## 2021-10-16 PROCEDURE — 82947 ASSAY GLUCOSE BLOOD QUANT: CPT

## 2021-10-16 PROCEDURE — 2580000003 HC RX 258: Performed by: STUDENT IN AN ORGANIZED HEALTH CARE EDUCATION/TRAINING PROGRAM

## 2021-10-16 PROCEDURE — 2060000000 HC ICU INTERMEDIATE R&B

## 2021-10-16 PROCEDURE — 85025 COMPLETE CBC W/AUTO DIFF WBC: CPT

## 2021-10-16 PROCEDURE — 6360000002 HC RX W HCPCS: Performed by: STUDENT IN AN ORGANIZED HEALTH CARE EDUCATION/TRAINING PROGRAM

## 2021-10-16 PROCEDURE — 80048 BASIC METABOLIC PNL TOTAL CA: CPT

## 2021-10-16 PROCEDURE — 99231 SBSQ HOSP IP/OBS SF/LOW 25: CPT | Performed by: STUDENT IN AN ORGANIZED HEALTH CARE EDUCATION/TRAINING PROGRAM

## 2021-10-16 PROCEDURE — 93005 ELECTROCARDIOGRAM TRACING: CPT | Performed by: STUDENT IN AN ORGANIZED HEALTH CARE EDUCATION/TRAINING PROGRAM

## 2021-10-16 PROCEDURE — 36415 COLL VENOUS BLD VENIPUNCTURE: CPT

## 2021-10-16 RX ADMIN — ENOXAPARIN SODIUM 40 MG: 40 INJECTION SUBCUTANEOUS at 09:11

## 2021-10-16 RX ADMIN — ACETAMINOPHEN 650 MG: 325 TABLET ORAL at 09:22

## 2021-10-16 RX ADMIN — Medication 81 MG: at 09:11

## 2021-10-16 RX ADMIN — ATORVASTATIN CALCIUM 80 MG: 80 TABLET, FILM COATED ORAL at 20:36

## 2021-10-16 RX ADMIN — CLOPIDOGREL 75 MG: 75 TABLET, FILM COATED ORAL at 09:11

## 2021-10-16 RX ADMIN — SODIUM CHLORIDE: 9 INJECTION, SOLUTION INTRAVENOUS at 00:26

## 2021-10-16 RX ADMIN — ACETAMINOPHEN 650 MG: 325 TABLET ORAL at 20:36

## 2021-10-16 RX ADMIN — SODIUM CHLORIDE, PRESERVATIVE FREE 10 ML: 5 INJECTION INTRAVENOUS at 20:36

## 2021-10-16 ASSESSMENT — PAIN DESCRIPTION - PAIN TYPE: TYPE: ACUTE PAIN;CHRONIC PAIN

## 2021-10-16 ASSESSMENT — ENCOUNTER SYMPTOMS
WHEEZING: 0
CHEST TIGHTNESS: 0
CONSTIPATION: 0
ABDOMINAL PAIN: 0
SHORTNESS OF BREATH: 0
COUGH: 0
NAUSEA: 0
ABDOMINAL DISTENTION: 0
DIARRHEA: 0
APNEA: 0
VOMITING: 0

## 2021-10-16 ASSESSMENT — PAIN SCALES - GENERAL
PAINLEVEL_OUTOF10: 0
PAINLEVEL_OUTOF10: 0
PAINLEVEL_OUTOF10: 5
PAINLEVEL_OUTOF10: 0
PAINLEVEL_OUTOF10: 2
PAINLEVEL_OUTOF10: 3
PAINLEVEL_OUTOF10: 5

## 2021-10-16 ASSESSMENT — PAIN DESCRIPTION - ORIENTATION: ORIENTATION: LEFT

## 2021-10-16 ASSESSMENT — PAIN DESCRIPTION - LOCATION: LOCATION: BACK;JAW

## 2021-10-16 NOTE — PROGRESS NOTES
[facility]   [address] [place] [address]    Progress Note             Date:   10/16/2021  Patient name:  Ramirez Wilcox  Date of admission:  10/14/2021  4:07 PM  MRN:   [id number]  Account:  [de-identified]  YOB: 1965  PCP:    No primary care provider on file. Room:   [address] [place] [address]  Code Status:    Full Code    Chief Complaint:     Chief Complaint   Patient presents with    Cerebrovascular Accident       Interval hx: The patient was seen and examined at bedside. Is vitally stable, alert and oriented x 4. No acute events overnight. The patient states he slept well overnight, denies any complaints at this time. Stated that he continues to feel weak on his left side, symptoms appear to be stable, no significant improvement. Patient counseled regarding diagnosis of acute right basal ganglia stroke, imaging discussed with patient and his son. Patient counseled in detail regarding the need for therapy, follow-up and patient expressed understanding and agreement with treatment plan. MRI brain and echocardiogram pending. PMNR recommending acute rehab, case discussed with  and working on rehab placement near patient's home. Brief History of Present Illness: The patient is a 60 y.o. male with past medical history of hypertension, chronic smoker, who presents with Left upper and lower extremity weakness and numbness. Last known well 6 AM.  The patient is not on blood thinners. Initial NIHSS of 2. Initial systolic blood pressure was more than 200. Blood sugar within normal meds. The patient is working the third shift, he said that around 6 AM he started to feel abnormal, then followed by left-sided weakness. He denied double vision, change in speech, facial droop, right-sided weakness, seizures or loss of consciousness, dizziness, headaches, nausea or vomiting, change in bowel or urinary habits.   Neuro exam revealed left upper extremity drift with no weakness, mild decrease in sensation in the left upper and lower extremity. Will get stat CT head along with stat CT of the neck with contrast.    Past Medical History:     Past Medical History:   Diagnosis Date    Hypertension     Tobacco abuse         Past Surgical History:     No past surgical history on file. Medications Prior to Admission:     Prior to Admission medications    Medication Sig Start Date End Date Taking? Authorizing Provider   albuterol sulfate HFA (VENTOLIN HFA) 108 (90 Base) MCG/ACT inhaler Inhale 2 puffs into the lungs every 6 hours as needed for Wheezing 6/2/21  Yes CHRISTAL Wesley CNP   loratadine (CLARITIN) 10 MG tablet Take 10 mg by mouth daily. Yes Historical Provider, MD   lisinopril (PRINIVIL;ZESTRIL) 10 MG tablet Take 1 tablet by mouth daily 12/30/17   Nixon Oconnell   Chlorpheniramine-PSE-Ibuprofen (ADVIL ALLERGY SINUS PO) Take  by mouth. Historical Provider, MD   ibuprofen (IBU) 800 MG tablet Take 1 tablet by mouth every 8 hours as needed for Pain. 9/27/13   JAMIL Oconnell        Allergies:     Penicillins    Social History:     Tobacco:    reports that he has been smoking cigarettes. He has a 30.00 pack-year smoking history. He has never used smokeless tobacco.  Alcohol:      reports current alcohol use. Drug Use:  reports no history of drug use. Family History:     No family history on file. Review of Systems:     Review of Systems   Constitutional: Negative for activity change, appetite change, chills, diaphoresis and fever. Respiratory: Negative for apnea, cough, chest tightness, shortness of breath and wheezing. Cardiovascular: Negative for chest pain and palpitations. Gastrointestinal: Negative for abdominal distention, abdominal pain, constipation, diarrhea, nausea and vomiting. Genitourinary: Negative for difficulty urinating, dysuria and frequency. Musculoskeletal: Negative for arthralgias and myalgias.    Neurological: Positive for weakness (Left-sided). Negative for dizziness, light-headedness and headaches. Psychiatric/Behavioral: Negative for agitation and confusion. All other systems reviewed and are negative.       Physical Exam:   BP (!) 154/87   Pulse 85   Temp 98 °F (36.7 °C) (Oral)   Resp 21   Ht 5' 9.5\" (1.765 m)   Wt 209 lb 14.1 oz (95.2 kg)   SpO2 97%   BMI 30.55 kg/m²   Temp (24hrs), Av.2 °F (36.8 °C), Min:97.9 °F (36.6 °C), Max:98.8 °F (37.1 °C)    Recent Labs     10/15/21  1342 10/15/21  1654 10/15/21  2033 10/16/21  0750   POCGLU 131* 153* 143* 156*       Intake/Output Summary (Last 24 hours) at 10/16/2021 1052  Last data filed at 10/16/2021 0602  Gross per 24 hour   Intake --   Output 1225 ml   Net -1225 ml         Neurologic Exam     GENERAL  Appears comfortable and in no distress   HEENT  NC/ AT   HEART  S1 and S2 heard; palpation of pulses: radial pulse    NECK  Supple and no bruits heard   MENTAL STATUS:  Alert, oriented, intact memory, no confusion, normal speech, normal language, no hallucination or delusion   CRANIAL NERVES: II     -      Visual fields intact to confrontation  III,IV,VI -  PERR, EOMs full, no ptosis  V     -     Normal facial sensation   VII    -     Normal facial symmetry  VIII   -     Intact hearing   IX,X -     Symmetrical palate  XI    -     Symmetrical shoulder shrug  XII   -     Midline tongue, no atrophy    MOTOR FUNCTION: RUE: Significant for good strength of grade 5/5 in proximal and distal muscle groups   LUE: Significant for good strength of grade 3/5 in proximal and distal muscle groups   RLE: Significant for good strength of grade 5/5 in proximal and distal muscle groups   LLE: Significant for good strength of grade 5/5 in proximal and 1/5 in distal muscle groups      Normal bulk, normal tone and no involuntary movements, no tremor   SENSORY FUNCTION:  Normal touch, normal pinprick, normal vibration, normal proprioception   CEREBELLAR FUNCTION:  Intact fine motor control over upper limbs and lower limbs   REFLEX FUNCTION:  Symmetric in upper and lower extremities, no Babinski sign   STATION and GAIT  Normal gait and tandem station, normal tip toes and heel walking       Investigations:      Laboratory Testing:  Recent Results (from the past 24 hour(s))   POC Glucose Fingerstick    Collection Time: 10/15/21  1:42 PM   Result Value Ref Range    POC Glucose 131 (H) 75 - 110 mg/dL   POC Glucose Fingerstick    Collection Time: 10/15/21  4:54 PM   Result Value Ref Range    POC Glucose 153 (H) 75 - 110 mg/dL   POC Glucose Fingerstick    Collection Time: 10/15/21  8:33 PM   Result Value Ref Range    POC Glucose 143 (H) 75 - 110 mg/dL   EKG 12 lead    Collection Time: 10/16/21 12:36 AM   Result Value Ref Range    Ventricular Rate 60 BPM    Atrial Rate 60 BPM    P-R Interval 142 ms    QRS Duration 100 ms    Q-T Interval 430 ms    QTc Calculation (Bazett) 430 ms    P Axis 42 degrees    R Axis 69 degrees    T Axis 53 degrees   POC Glucose Fingerstick    Collection Time: 10/16/21  7:50 AM   Result Value Ref Range    POC Glucose 156 (H) 75 - 110 mg/dL   CBC WITH AUTO DIFFERENTIAL    Collection Time: 10/16/21  9:54 AM   Result Value Ref Range    WBC 5.0 3.5 - 11.3 k/uL    RBC 5.15 4.21 - 5.77 m/uL    Hemoglobin 15.8 13.0 - 17.0 g/dL    Hematocrit 48.5 40.7 - 50.3 %    MCV 94.2 82.6 - 102.9 fL    MCH 30.7 25.2 - 33.5 pg    MCHC 32.6 28.4 - 34.8 g/dL    RDW 12.4 11.8 - 14.4 %    Platelets 936 656 - 340 k/uL    MPV 9.2 8.1 - 13.5 fL    NRBC Automated 0.0 0.0 per 100 WBC    Differential Type NOT REPORTED     WBC Morphology NOT REPORTED     RBC Morphology NOT REPORTED     Platelet Estimate NOT REPORTED     Seg Neutrophils 62 36 - 65 %    Lymphocytes 28 24 - 43 %    Monocytes 8 3 - 12 %    Eosinophils % 1 1 - 4 %    Basophils 1 0 - 2 %    Immature Granulocytes 0 0 %    Segs Absolute 3.05 1.50 - 8.10 k/uL    Absolute Lymph # 1.39 1.10 - 3.70 k/uL    Absolute Mono # 0.39 0.10 - 1.20 k/uL    Absolute Eos # 0. 06 0.00 - 0.44 k/uL    Basophils Absolute 0.05 0.00 - 0.20 k/uL    Absolute Immature Granulocyte <0.03 0.00 - 0.30 k/uL   Basic Metabolic Panel w/ Reflex to MG    Collection Time: 10/16/21  9:54 AM   Result Value Ref Range    Glucose 138 (H) 70 - 99 mg/dL    BUN 11 6 - 20 mg/dL    CREATININE 0.77 0.70 - 1.20 mg/dL    Bun/Cre Ratio NOT REPORTED 9 - 20    Calcium 9.0 8.6 - 10.4 mg/dL    Sodium 135 135 - 144 mmol/L    Potassium 4.3 3.7 - 5.3 mmol/L    Chloride 102 98 - 107 mmol/L    CO2 23 20 - 31 mmol/L    Anion Gap 10 9 - 17 mmol/L    GFR Non-African American >60 >60 mL/min    GFR African American >60 >60 mL/min    GFR Comment          GFR Staging NOT REPORTED      Recent Labs     10/16/21  0954   WBC 5.0   RBC 5.15   HGB 15.8   HCT 48.5   MCV 94.2   MCH 30.7   MCHC 32.6   RDW 12.4      MPV 9.2     Recent Labs     10/16/21  0954      K 4.3      CO2 23   BUN 11   CREATININE 0.77   GLUCOSE 138*   CALCIUM 9.0     Hemoglobin A1C   Date Value Ref Range Status   10/15/2021 7.7 (H) 4.0 - 6.0 % Final       Assessment :      Primary Problem  <principal problem not specified>    Active Hospital Problems    Diagnosis Date Noted    [facility] [I63.9]     Stroke-like symptom [R29.90] 10/14/2021       Patient is a 60 y.o. male presented with left upper and lower extremity weakness, foot drop. Patient presented as a stroke alert, NIH 2 at presentation. Not a candidate for TPA, CT head unremarkable, CTA head and neck showed focal calcification of the origin of the left vertebral artery. MRI brain and echocardiogram pending. Stroke work-up ongoing. Symptoms slightly worsened. Loaded with aspirin and Plavix. MRI brain positive for acute/subacute right basal ganglia stroke. 1. Acute/subacute right basal ganglia stroke  2. Hypertension    Plan:     · MRI brain without contrast: Acute/subacute right basal ganglia stroke.   · Echocardiogram pending  · Continue aspirin 81 mg and Plavix 75 mg daily, loaded with aspirin and Plavix  · Continue Lipitor 80 mg nightly  · LDL 97, HbA1c 7.7  · PT/OT/ST  · PMNR consulted: Recommending acute rehab  · SBP goal less than 220   · We will complete hypercoagulable work-up as outpatient  · We will consider LATA and loop recorder placement after reviewing 2D echo. · Discharge planning ongoing,  evaluating rehab options close to patient's home  · Lovenox for DVT prophylaxis      Follow-up further recommendations after discussing the case with attending  The plan was discussed with the patient, patient's family and the medical staff. Consultations:   IP CONSULT TO STROKE TEAM  IP CONSULT TO PHYSICAL MEDICINE REHAB    Patient is admitted as inpatient status because of co-morbidities listed above, severity of signs and symptoms as outlined, requirement for current medical therapies and most importantly because of direct risk to patient if care not provided in a hospital setting. Yash Groves MD  10/16/2021  10:52 AM    Copy sent to Dr. Collins primary care provider on file.

## 2021-10-16 NOTE — CARE COORDINATION
Transitional planning:  Dr. Victoriano Woodson called [facility] [place] to discuss discharge plans. Pt can be discharged after his echocardiogram today. 1225 Met with pt and son at bedside. [facility] but have no chosen [facility] because of distance. Was asked to return with ARU choice after they discuss it more. 1500 Met with unit RN about plan of care. Met with pt and son and they decided [facility] on [facility]. Referral placed. Arnold Maloney to nurse on rehab unit at [phone], no one from admissions will be in until Monday.

## 2021-10-17 LAB
ABSOLUTE EOS #: 0.15 K/UL (ref 0–0.44)
ABSOLUTE EOS #: 0.17 K/UL (ref 0–0.44)
ABSOLUTE IMMATURE GRANULOCYTE: <0.03 K/UL (ref 0–0.3)
ABSOLUTE IMMATURE GRANULOCYTE: <0.03 K/UL (ref 0–0.3)
ABSOLUTE LYMPH #: 2.36 K/UL (ref 1.1–3.7)
ABSOLUTE LYMPH #: 2.44 K/UL (ref 1.1–3.7)
ABSOLUTE MONO #: 0.54 K/UL (ref 0.1–1.2)
ABSOLUTE MONO #: 0.69 K/UL (ref 0.1–1.2)
ANION GAP SERPL CALCULATED.3IONS-SCNC: 9 MMOL/L (ref 9–17)
BASOPHILS # BLD: 1 % (ref 0–2)
BASOPHILS # BLD: 1 % (ref 0–2)
BASOPHILS ABSOLUTE: 0.07 K/UL (ref 0–0.2)
BASOPHILS ABSOLUTE: 0.07 K/UL (ref 0–0.2)
BUN BLDV-MCNC: 15 MG/DL (ref 6–20)
BUN/CREAT BLD: ABNORMAL (ref 9–20)
CALCIUM SERPL-MCNC: 8.7 MG/DL (ref 8.6–10.4)
CHLORIDE BLD-SCNC: 101 MMOL/L (ref 98–107)
CO2: 23 MMOL/L (ref 20–31)
CREAT SERPL-MCNC: 0.91 MG/DL (ref 0.7–1.2)
DIFFERENTIAL TYPE: NORMAL
DIFFERENTIAL TYPE: NORMAL
EOSINOPHILS RELATIVE PERCENT: 2 % (ref 1–4)
EOSINOPHILS RELATIVE PERCENT: 3 % (ref 1–4)
GFR AFRICAN AMERICAN: >60 ML/MIN
GFR NON-AFRICAN AMERICAN: >60 ML/MIN
GFR SERPL CREATININE-BSD FRML MDRD: ABNORMAL ML/MIN/{1.73_M2}
GFR SERPL CREATININE-BSD FRML MDRD: ABNORMAL ML/MIN/{1.73_M2}
GLUCOSE BLD-MCNC: 143 MG/DL (ref 75–110)
GLUCOSE BLD-MCNC: 146 MG/DL (ref 70–99)
GLUCOSE BLD-MCNC: 146 MG/DL (ref 75–110)
GLUCOSE BLD-MCNC: 150 MG/DL (ref 75–110)
GLUCOSE BLD-MCNC: 158 MG/DL (ref 75–110)
HCT VFR BLD CALC: 45.7 % (ref 40.7–50.3)
HCT VFR BLD CALC: 50.2 % (ref 40.7–50.3)
HEMOGLOBIN: 15.6 G/DL (ref 13–17)
HEMOGLOBIN: 16.3 G/DL (ref 13–17)
IMMATURE GRANULOCYTES: 0 %
IMMATURE GRANULOCYTES: 0 %
LYMPHOCYTES # BLD: 35 % (ref 24–43)
LYMPHOCYTES # BLD: 40 % (ref 24–43)
MCH RBC QN AUTO: 30.8 PG (ref 25.2–33.5)
MCH RBC QN AUTO: 30.9 PG (ref 25.2–33.5)
MCHC RBC AUTO-ENTMCNC: 32.5 G/DL (ref 28.4–34.8)
MCHC RBC AUTO-ENTMCNC: 34.1 G/DL (ref 28.4–34.8)
MCV RBC AUTO: 90.5 FL (ref 82.6–102.9)
MCV RBC AUTO: 94.7 FL (ref 82.6–102.9)
MONOCYTES # BLD: 12 % (ref 3–12)
MONOCYTES # BLD: 8 % (ref 3–12)
NRBC AUTOMATED: 0 PER 100 WBC
NRBC AUTOMATED: 0 PER 100 WBC
PDW BLD-RTO: 12.6 % (ref 11.8–14.4)
PDW BLD-RTO: 12.7 % (ref 11.8–14.4)
PLATELET # BLD: 215 K/UL (ref 138–453)
PLATELET # BLD: NORMAL K/UL (ref 138–453)
PLATELET ESTIMATE: NORMAL
PLATELET ESTIMATE: NORMAL
PLATELET, FLUORESCENCE: 72 K/UL (ref 138–453)
PLATELET, IMMATURE FRACTION: 9.2 % (ref 1.1–10.3)
PMV BLD AUTO: 9 FL (ref 8.1–13.5)
PMV BLD AUTO: NORMAL FL (ref 8.1–13.5)
POTASSIUM SERPL-SCNC: 4.3 MMOL/L (ref 3.7–5.3)
RBC # BLD: 5.05 M/UL (ref 4.21–5.77)
RBC # BLD: 5.3 M/UL (ref 4.21–5.77)
RBC # BLD: NORMAL 10*6/UL
RBC # BLD: NORMAL 10*6/UL
SEG NEUTROPHILS: 43 % (ref 36–65)
SEG NEUTROPHILS: 54 % (ref 36–65)
SEGMENTED NEUTROPHILS ABSOLUTE COUNT: 2.53 K/UL (ref 1.5–8.1)
SEGMENTED NEUTROPHILS ABSOLUTE COUNT: 3.7 K/UL (ref 1.5–8.1)
SODIUM BLD-SCNC: 133 MMOL/L (ref 135–144)
WBC # BLD: 5.8 K/UL (ref 3.5–11.3)
WBC # BLD: 6.9 K/UL (ref 3.5–11.3)
WBC # BLD: NORMAL 10*3/UL
WBC # BLD: NORMAL 10*3/UL

## 2021-10-17 PROCEDURE — 80048 BASIC METABOLIC PNL TOTAL CA: CPT

## 2021-10-17 PROCEDURE — 6370000000 HC RX 637 (ALT 250 FOR IP): Performed by: STUDENT IN AN ORGANIZED HEALTH CARE EDUCATION/TRAINING PROGRAM

## 2021-10-17 PROCEDURE — 99231 SBSQ HOSP IP/OBS SF/LOW 25: CPT | Performed by: STUDENT IN AN ORGANIZED HEALTH CARE EDUCATION/TRAINING PROGRAM

## 2021-10-17 PROCEDURE — 2580000003 HC RX 258: Performed by: STUDENT IN AN ORGANIZED HEALTH CARE EDUCATION/TRAINING PROGRAM

## 2021-10-17 PROCEDURE — 85055 RETICULATED PLATELET ASSAY: CPT

## 2021-10-17 PROCEDURE — 86022 PLATELET ANTIBODIES: CPT

## 2021-10-17 PROCEDURE — 2060000000 HC ICU INTERMEDIATE R&B

## 2021-10-17 PROCEDURE — 36415 COLL VENOUS BLD VENIPUNCTURE: CPT

## 2021-10-17 PROCEDURE — 82947 ASSAY GLUCOSE BLOOD QUANT: CPT

## 2021-10-17 PROCEDURE — 85025 COMPLETE CBC W/AUTO DIFF WBC: CPT

## 2021-10-17 PROCEDURE — 6360000002 HC RX W HCPCS: Performed by: STUDENT IN AN ORGANIZED HEALTH CARE EDUCATION/TRAINING PROGRAM

## 2021-10-17 RX ADMIN — SODIUM CHLORIDE, PRESERVATIVE FREE 10 ML: 5 INJECTION INTRAVENOUS at 09:05

## 2021-10-17 RX ADMIN — CLOPIDOGREL 75 MG: 75 TABLET, FILM COATED ORAL at 09:04

## 2021-10-17 RX ADMIN — ACETAMINOPHEN 650 MG: 325 TABLET ORAL at 17:56

## 2021-10-17 RX ADMIN — ATORVASTATIN CALCIUM 80 MG: 80 TABLET, FILM COATED ORAL at 21:04

## 2021-10-17 RX ADMIN — ACETAMINOPHEN 650 MG: 325 TABLET ORAL at 04:39

## 2021-10-17 RX ADMIN — ENOXAPARIN SODIUM 40 MG: 40 INJECTION SUBCUTANEOUS at 09:04

## 2021-10-17 RX ADMIN — Medication 81 MG: at 09:04

## 2021-10-17 ASSESSMENT — PAIN DESCRIPTION - PAIN TYPE: TYPE: ACUTE PAIN

## 2021-10-17 ASSESSMENT — PAIN SCALES - GENERAL
PAINLEVEL_OUTOF10: 6
PAINLEVEL_OUTOF10: 3
PAINLEVEL_OUTOF10: 0

## 2021-10-17 ASSESSMENT — PAIN DESCRIPTION - ORIENTATION: ORIENTATION: LEFT

## 2021-10-17 ASSESSMENT — PAIN DESCRIPTION - LOCATION: LOCATION: JAW

## 2021-10-17 NOTE — PROGRESS NOTES
[facility] Neurology   [address] [place] [address]    Progress Note             Date:   10/17/2021  Patient name:  March Cheese  Date of admission:  10/14/2021  4:07 PM  MRN:   [id number]  Account:  [de-identified]  YOB: 1965  PCP:    No primary care provider on file. Room:   [address] [place] [address]  Code Status:    Full Code    Chief Complaint:     Chief Complaint   Patient presents with    Cerebrovascular Accident       Interval hx: The patient was seen and examined at bedside. Is vitally stable, alert and oriented x 4. No significant events over night  Vitals are within normal limits  Sodium is 133  Blood glucose is 146  Platelets are 72 today dropped from 206 Yesterday    PMNR recommending acute rehab, case discussed with  and working on rehab placement near patient's home. Brief History of Present Illness: The patient is a 60 y.o. male with past medical history of hypertension, chronic smoker, who presents with Left upper and lower extremity weakness and numbness. Last known well 6 AM.  The patient is not on blood thinners. Initial NIHSS of 2. Initial systolic blood pressure was more than 200. Blood sugar within normal meds. The patient is working the third shift, he said that around 6 AM he started to feel abnormal, then followed by left-sided weakness. He denied double vision, change in speech, facial droop, right-sided weakness, seizures or loss of consciousness, dizziness, headaches, nausea or vomiting, change in bowel or urinary habits. Neuro exam revealed left upper extremity drift with no weakness, mild decrease in sensation in the left upper and lower extremity. Will get stat CT head along with stat CT of the neck with contrast.    Past Medical History:     Past Medical History:   Diagnosis Date    Hypertension     Tobacco abuse         Past Surgical History:     No past surgical history on file.      Medications Prior to Admission:     Prior to Admission medications    Medication Sig Start Date End Date Taking? Authorizing Provider   albuterol sulfate HFA (VENTOLIN HFA) 108 (90 Base) MCG/ACT inhaler Inhale 2 puffs into the lungs every 6 hours as needed for Wheezing 6/2/21  Yes CHRISTAL Cook CNP   loratadine (CLARITIN) 10 MG tablet Take 10 mg by mouth daily. Yes Historical Provider, MD   lisinopril (PRINIVIL;ZESTRIL) 10 MG tablet Take 1 tablet by mouth daily 12/30/17   Link Nixon Wade   Chlorpheniramine-PSE-Ibuprofen (ADVIL ALLERGY SINUS PO) Take  by mouth. Historical Provider, MD   ibuprofen (IBU) 800 MG tablet Take 1 tablet by mouth every 8 hours as needed for Pain. 9/27/13   Link JAMIL Wade        Allergies:     Penicillins    Social History:     Tobacco:    reports that he has been smoking cigarettes. He has a 30.00 pack-year smoking history. He has never used smokeless tobacco.  Alcohol:      reports current alcohol use. Drug Use:  reports no history of drug use. Family History:     No family history on file. Review of Systems:   Review of Systems   Constitutional: Negative. Negative for activity change, appetite change, chills, diaphoresis, fatigue, fever and unexpected weight change. HENT: Negative. Negative for congestion, facial swelling, hearing loss, postnasal drip, rhinorrhea, sinus pain, tinnitus and voice change. Eyes: Negative. Negative for photophobia, pain, discharge, redness, itching and visual disturbance. Respiratory: Negative. Negative for apnea, cough, choking, chest tightness, shortness of breath, wheezing and stridor. Cardiovascular: Negative. Negative for chest pain, palpitations and leg swelling. Gastrointestinal: Negative. Negative for abdominal distention, abdominal pain, constipation, diarrhea, nausea and vomiting. Endocrine: Negative. Negative for cold intolerance, heat intolerance and polyuria. Genitourinary: Negative.   Negative for difficulty urinating, dysuria, frequency and urgency. Musculoskeletal: Negative. Negative for arthralgias, back pain, gait problem and myalgias. Skin: Negative. Negative for color change, pallor, rash and wound. Allergic/Immunologic: Negative. Neurological: Positive for weakness (Left-sided). Negative for dizziness, tremors, seizures, syncope, facial asymmetry, speech difficulty, light-headedness, numbness and headaches. Hematological: Negative. Negative for adenopathy. Does not bruise/bleed easily. Psychiatric/Behavioral: Negative. Negative for agitation, behavioral problems, confusion, decreased concentration, dysphoric mood, hallucinations, self-injury, sleep disturbance and suicidal ideas. The patient is not nervous/anxious and is not hyperactive. All other systems reviewed and are negative.       Physical Exam:   BP (!) 144/97   Pulse 62   Temp 97.5 °F (36.4 °C) (Oral)   Resp 16   Ht 5' 9.5\" (1.765 m)   Wt 209 lb 14.1 oz (95.2 kg)   SpO2 97%   BMI 30.55 kg/m²   Temp (24hrs), Av.8 °F (36.6 °C), Min:97.5 °F (36.4 °C), Max:98.1 °F (36.7 °C)    Recent Labs     10/16/21  1154 10/16/21  1614 10/16/21  2043 10/17/21  0742   POCGLU 140* 133* 144* 146*       Intake/Output Summary (Last 24 hours) at 10/17/2021 0849  Last data filed at 10/16/2021 2340  Gross per 24 hour   Intake --   Output 650 ml   Net -650 ml           GENERAL  Appears comfortable and in no distress   HEENT  NC/ AT   HEART  S1 and S2 heard; palpation of pulses: radial pulse    NECK  Supple and no bruits heard   MENTAL STATUS:  Alert, oriented, intact memory, no confusion, normal speech, normal language, no hallucination or delusion   CRANIAL NERVES: II     -      Visual fields intact to confrontation  III,IV,VI -  PERR, EOMs full, no ptosis  V     -     Normal facial sensation   VII    -     Normal facial symmetry  VIII   -     Intact hearing   IX,X -     Symmetrical palate  XI    -     Symmetrical shoulder shrug  XII   -     Midline tongue, no atrophy    MOTOR FUNCTION: RUE: Significant for good strength of grade 5/5 in proximal and distal muscle groups   LUE: Significant for good strength of grade 3/5 in proximal and distal muscle groups   RLE: Significant for good strength of grade 5/5 in proximal and distal muscle groups   LLE: Significant for good strength of grade 5/5 in proximal and 3/5 in distal muscle groups      Normal bulk, normal tone and no involuntary movements, no tremor   SENSORY FUNCTION:  Normal touch, normal pinprick, normal vibration, normal proprioception   CEREBELLAR FUNCTION:  Intact fine motor control over upper limbs and lower limbs   REFLEX FUNCTION:  Symmetric in upper and lower extremities, no Babinski sign   STATION and GAIT  Normal gait and tandem station, normal tip toes and heel walking       Investigations:      Laboratory Testing:  Recent Results (from the past 24 hour(s))   CBC WITH AUTO DIFFERENTIAL    Collection Time: 10/16/21  9:54 AM   Result Value Ref Range    WBC 5.0 3.5 - 11.3 k/uL    RBC 5.15 4.21 - 5.77 m/uL    Hemoglobin 15.8 13.0 - 17.0 g/dL    Hematocrit 48.5 40.7 - 50.3 %    MCV 94.2 82.6 - 102.9 fL    MCH 30.7 25.2 - 33.5 pg    MCHC 32.6 28.4 - 34.8 g/dL    RDW 12.4 11.8 - 14.4 %    Platelets 395 054 - 217 k/uL    MPV 9.2 8.1 - 13.5 fL    NRBC Automated 0.0 0.0 per 100 WBC    Differential Type NOT REPORTED     WBC Morphology NOT REPORTED     RBC Morphology NOT REPORTED     Platelet Estimate NOT REPORTED     Seg Neutrophils 62 36 - 65 %    Lymphocytes 28 24 - 43 %    Monocytes 8 3 - 12 %    Eosinophils % 1 1 - 4 %    Basophils 1 0 - 2 %    Immature Granulocytes 0 0 %    Segs Absolute 3.05 1.50 - 8.10 k/uL    Absolute Lymph # 1.39 1.10 - 3.70 k/uL    Absolute Mono # 0.39 0.10 - 1.20 k/uL    Absolute Eos # 0.06 0.00 - 0.44 k/uL    Basophils Absolute 0.05 0.00 - 0.20 k/uL    Absolute Immature Granulocyte <0.03 0.00 - 0.30 k/uL   Basic Metabolic Panel w/ Reflex to MG    Collection Time: 10/16/21  9:54 AM   Result Value Ref Range Glucose 138 (H) 70 - 99 mg/dL    BUN 11 6 - 20 mg/dL    CREATININE 0.77 0.70 - 1.20 mg/dL    Bun/Cre Ratio NOT REPORTED 9 - 20    Calcium 9.0 8.6 - 10.4 mg/dL    Sodium 135 135 - 144 mmol/L    Potassium 4.3 3.7 - 5.3 mmol/L    Chloride 102 98 - 107 mmol/L    CO2 23 20 - 31 mmol/L    Anion Gap 10 9 - 17 mmol/L    GFR Non-African American >60 >60 mL/min    GFR African American >60 >60 mL/min    GFR Comment          GFR Staging NOT REPORTED    POC Glucose Fingerstick    Collection Time: 10/16/21 11:54 AM   Result Value Ref Range    POC Glucose 140 (H) 75 - 110 mg/dL   POC Glucose Fingerstick    Collection Time: 10/16/21  4:14 PM   Result Value Ref Range    POC Glucose 133 (H) 75 - 110 mg/dL   POC Glucose Fingerstick    Collection Time: 10/16/21  8:43 PM   Result Value Ref Range    POC Glucose 144 (H) 75 - 110 mg/dL   CBC WITH AUTO DIFFERENTIAL    Collection Time: 10/17/21  4:33 AM   Result Value Ref Range    WBC 5.8 3.5 - 11.3 k/uL    RBC 5.05 4.21 - 5.77 m/uL    Hemoglobin 15.6 13.0 - 17.0 g/dL    Hematocrit 45.7 40.7 - 50.3 %    MCV 90.5 82.6 - 102.9 fL    MCH 30.9 25.2 - 33.5 pg    MCHC 34.1 28.4 - 34.8 g/dL    RDW 12.6 11.8 - 14.4 %    Platelets See Reflexed IPF Result 138 - 453 k/uL    MPV NOT REPORTED 8.1 - 13.5 fL    NRBC Automated 0.0 0.0 per 100 WBC    Differential Type NOT REPORTED     WBC Morphology NOT REPORTED     RBC Morphology NOT REPORTED     Platelet Estimate NOT REPORTED     Seg Neutrophils 43 36 - 65 %    Lymphocytes 40 24 - 43 %    Monocytes 12 3 - 12 %    Eosinophils % 3 1 - 4 %    Basophils 1 0 - 2 %    Immature Granulocytes 0 0 %    Segs Absolute 2.53 1.50 - 8.10 k/uL    Absolute Lymph # 2.36 1.10 - 3.70 k/uL    Absolute Mono # 0.69 0.10 - 1.20 k/uL    Absolute Eos # 0.17 0.00 - 0.44 k/uL    Basophils Absolute 0.07 0.00 - 0.20 k/uL    Absolute Immature Granulocyte <0.03 0.00 - 0.30 k/uL   Basic Metabolic Panel w/ Reflex to MG    Collection Time: 10/17/21  4:33 AM   Result Value Ref Range Glucose 146 (H) 70 - 99 mg/dL    BUN 15 6 - 20 mg/dL    CREATININE 0.91 0.70 - 1.20 mg/dL    Bun/Cre Ratio NOT REPORTED 9 - 20    Calcium 8.7 8.6 - 10.4 mg/dL    Sodium 133 (L) 135 - 144 mmol/L    Potassium 4.3 3.7 - 5.3 mmol/L    Chloride 101 98 - 107 mmol/L    CO2 23 20 - 31 mmol/L    Anion Gap 9 9 - 17 mmol/L    GFR Non-African American >60 >60 mL/min    GFR African American >60 >60 mL/min    GFR Comment          GFR Staging NOT REPORTED    Immature Platelet Fraction    Collection Time: 10/17/21  4:33 AM   Result Value Ref Range    Platelet, Immature Fraction 9.2 1.1 - 10.3 %    Platelet, Fluorescence 72 (L) 138 - 453 k/uL   POC Glucose Fingerstick    Collection Time: 10/17/21  7:42 AM   Result Value Ref Range    POC Glucose 146 (H) 75 - 110 mg/dL     Recent Labs     10/17/21  0433   WBC 5.8   RBC 5.05   HGB 15.6   HCT 45.7   MCV 90.5   MCH 30.9   MCHC 34.1   RDW 12.6   PLT See Reflexed IPF Result   MPV NOT REPORTED     Recent Labs     10/17/21  0433   *   K 4.3      CO2 23   BUN 15   CREATININE 0.91   GLUCOSE 146*   CALCIUM 8.7     Hemoglobin A1C   Date Value Ref Range Status   10/15/2021 7.7 (H) 4.0 - 6.0 % Final       Assessment :      Primary Problem  <principal problem not specified>    Active Hospital Problems    Diagnosis Date Noted    Cerebrovascular accident (CVA) ([facility] [place] [I63.9]     Stroke-like symptom [R29.90] 10/14/2021       Patient is a 60 y.o. male presented with left upper and lower extremity weakness, foot drop. Patient presented as a stroke alert, NIH 2 at presentation. Not a candidate for TPA, CT head unremarkable, CTA head and neck showed focal calcification of the origin of the left vertebral artery. MRI brain shows acute/subacute ischemia in the right basal ganglia region and echocardiogram pending. Stroke work-up ongoing. Symptoms slightly worsened. Loaded with aspirin and Plavix.   Acute/subacute right basal ganglia stroke  Hypertension  Thromocytopenia    Plan:     MRI brain without contrast: Acute/subacute right basal ganglia stroke. Echocardiogram pending  Continue aspirin 81 mg and Plavix 75 mg daily, loaded with aspirin and Plavix  Continue Lipitor 80 mg nightly  LDL 97, HbA1c 7.7  Repeat platelets counts and if they are still low then we will check for HIT  PT/OT/ST  PMNR consulted: Recommending acute rehab  SBP goal less than 220   We will complete hypercoagulable work-up as outpatient  We will consider LATA and loop recorder placement after reviewing 2D echo. Discharge planning ongoing,  evaluating rehab options close to patient's home  Lovenox for DVT prophylaxis      Follow-up further recommendations after discussing the case with attending  The plan was discussed with the patient, patient's family and the medical staff. Consultations:   IP CONSULT TO STROKE TEAM  IP CONSULT TO PHYSICAL MEDICINE REHAB    Patient is admitted as inpatient status because of co-morbidities listed above, severity of signs and symptoms as outlined, requirement for current medical therapies and most importantly because of direct risk to patient if care not provided in a hospital setting. Jason Hurd MD ,  PGY-2, Neurology Resident,  10/17/2021,  8:49 AM.    Copy sent to Dr. Collins primary care provider on file.

## 2021-10-17 NOTE — PLAN OF CARE
Problem: Skin Integrity:  Goal: Will show no infection signs and symptoms  Description: Will show no infection signs and symptoms  10/17/2021 0556 by Marycruz Lee RN  Outcome: Ongoing  10/16/2021 1618 by Carey Leo RN  Outcome: Ongoing  Goal: Absence of new skin breakdown  Description: Absence of new skin breakdown  10/17/2021 0556 by Marycruz Lee RN  Outcome: Ongoing  10/16/2021 1618 by Carey Leo RN  Outcome: Ongoing     Problem: Falls - Risk of:  Goal: Will remain free from falls  Description: Will remain free from falls  10/17/2021 0556 by Marycruz Lee RN  Outcome: Ongoing  10/16/2021 1618 by Carey Leo RN  Outcome: Ongoing  Goal: Absence of physical injury  Description: Absence of physical injury  10/17/2021 0556 by Marycruz Lee RN  Outcome: Ongoing  10/16/2021 1618 by Carey Leo RN  Outcome: Ongoing     Problem: HEMODYNAMIC STATUS  Goal: Patient has stable vital signs and fluid balance  10/17/2021 0556 by Marycruz Lee RN  Outcome: Ongoing  10/16/2021 1618 by Carey Leo RN  Outcome: Ongoing     Problem: ACTIVITY INTOLERANCE/IMPAIRED MOBILITY  Goal: Mobility/activity is maintained at optimum level for patient  10/17/2021 0556 by Marycruz Lee RN  Outcome: Ongoing  10/16/2021 1618 by Carey Leo RN  Outcome: Ongoing     Problem: Pain:  Goal: Pain level will decrease  Description: Pain level will decrease  Outcome: Ongoing  Goal: Control of acute pain  Description: Control of acute pain  Outcome: Ongoing  Goal: Control of chronic pain  Description: Control of chronic pain  Outcome: Ongoing

## 2021-10-18 LAB
ABSOLUTE EOS #: 0.23 K/UL (ref 0–0.44)
ABSOLUTE IMMATURE GRANULOCYTE: <0.03 K/UL (ref 0–0.3)
ABSOLUTE LYMPH #: 2.58 K/UL (ref 1.1–3.7)
ABSOLUTE MONO #: 0.57 K/UL (ref 0.1–1.2)
ANION GAP SERPL CALCULATED.3IONS-SCNC: 11 MMOL/L (ref 9–17)
BASOPHILS # BLD: 1 % (ref 0–2)
BASOPHILS ABSOLUTE: 0.09 K/UL (ref 0–0.2)
BUN BLDV-MCNC: 18 MG/DL (ref 6–20)
BUN/CREAT BLD: ABNORMAL (ref 9–20)
CALCIUM SERPL-MCNC: 8.8 MG/DL (ref 8.6–10.4)
CHLORIDE BLD-SCNC: 104 MMOL/L (ref 98–107)
CO2: 22 MMOL/L (ref 20–31)
CREAT SERPL-MCNC: 0.94 MG/DL (ref 0.7–1.2)
DIFFERENTIAL TYPE: NORMAL
EOSINOPHILS RELATIVE PERCENT: 3 % (ref 1–4)
GFR AFRICAN AMERICAN: >60 ML/MIN
GFR NON-AFRICAN AMERICAN: >60 ML/MIN
GFR SERPL CREATININE-BSD FRML MDRD: ABNORMAL ML/MIN/{1.73_M2}
GFR SERPL CREATININE-BSD FRML MDRD: ABNORMAL ML/MIN/{1.73_M2}
GLUCOSE BLD-MCNC: 135 MG/DL (ref 75–110)
GLUCOSE BLD-MCNC: 143 MG/DL (ref 70–99)
GLUCOSE BLD-MCNC: 156 MG/DL (ref 75–110)
GLUCOSE BLD-MCNC: 162 MG/DL (ref 75–110)
GLUCOSE BLD-MCNC: 168 MG/DL (ref 75–110)
HCT VFR BLD CALC: 48.4 % (ref 40.7–50.3)
HEMOGLOBIN: 16 G/DL (ref 13–17)
HEPARIN INDUCED PLATELET ANTIBODY: 0.18 O.D. (ref 0–0.4)
IMMATURE GRANULOCYTES: 0 %
LV EF: 51 %
LVEF MODALITY: NORMAL
LYMPHOCYTES # BLD: 37 % (ref 24–43)
MCH RBC QN AUTO: 30.8 PG (ref 25.2–33.5)
MCHC RBC AUTO-ENTMCNC: 33.1 G/DL (ref 28.4–34.8)
MCV RBC AUTO: 93.1 FL (ref 82.6–102.9)
MONOCYTES # BLD: 8 % (ref 3–12)
NRBC AUTOMATED: 0 PER 100 WBC
PDW BLD-RTO: 12.5 % (ref 11.8–14.4)
PLATELET # BLD: 198 K/UL (ref 138–453)
PLATELET ESTIMATE: NORMAL
PMV BLD AUTO: 9.3 FL (ref 8.1–13.5)
POTASSIUM SERPL-SCNC: 4.1 MMOL/L (ref 3.7–5.3)
RBC # BLD: 5.2 M/UL (ref 4.21–5.77)
RBC # BLD: NORMAL 10*6/UL
SEG NEUTROPHILS: 51 % (ref 36–65)
SEGMENTED NEUTROPHILS ABSOLUTE COUNT: 3.4 K/UL (ref 1.5–8.1)
SODIUM BLD-SCNC: 137 MMOL/L (ref 135–144)
WBC # BLD: 6.9 K/UL (ref 3.5–11.3)
WBC # BLD: NORMAL 10*3/UL

## 2021-10-18 PROCEDURE — 85025 COMPLETE CBC W/AUTO DIFF WBC: CPT

## 2021-10-18 PROCEDURE — 6370000000 HC RX 637 (ALT 250 FOR IP): Performed by: STUDENT IN AN ORGANIZED HEALTH CARE EDUCATION/TRAINING PROGRAM

## 2021-10-18 PROCEDURE — 36415 COLL VENOUS BLD VENIPUNCTURE: CPT

## 2021-10-18 PROCEDURE — 2580000003 HC RX 258: Performed by: STUDENT IN AN ORGANIZED HEALTH CARE EDUCATION/TRAINING PROGRAM

## 2021-10-18 PROCEDURE — 99233 SBSQ HOSP IP/OBS HIGH 50: CPT | Performed by: PSYCHIATRY & NEUROLOGY

## 2021-10-18 PROCEDURE — 97110 THERAPEUTIC EXERCISES: CPT

## 2021-10-18 PROCEDURE — 94640 AIRWAY INHALATION TREATMENT: CPT

## 2021-10-18 PROCEDURE — 97535 SELF CARE MNGMENT TRAINING: CPT

## 2021-10-18 PROCEDURE — 97140 MANUAL THERAPY 1/> REGIONS: CPT

## 2021-10-18 PROCEDURE — 80048 BASIC METABOLIC PNL TOTAL CA: CPT

## 2021-10-18 PROCEDURE — 6360000002 HC RX W HCPCS: Performed by: STUDENT IN AN ORGANIZED HEALTH CARE EDUCATION/TRAINING PROGRAM

## 2021-10-18 PROCEDURE — 82947 ASSAY GLUCOSE BLOOD QUANT: CPT

## 2021-10-18 PROCEDURE — 2060000000 HC ICU INTERMEDIATE R&B

## 2021-10-18 PROCEDURE — 93306 TTE W/DOPPLER COMPLETE: CPT

## 2021-10-18 RX ORDER — ASPIRIN 81 MG/1
81 TABLET ORAL DAILY
Qty: 30 TABLET | Refills: 3 | Status: SHIPPED | OUTPATIENT
Start: 2021-10-19 | End: 2022-01-10 | Stop reason: SDUPTHER

## 2021-10-18 RX ORDER — CLOPIDOGREL BISULFATE 75 MG/1
75 TABLET ORAL DAILY
Qty: 17 TABLET | Refills: 0 | Status: SHIPPED | OUTPATIENT
Start: 2021-10-19 | End: 2022-01-10 | Stop reason: ALTCHOICE

## 2021-10-18 RX ORDER — ATORVASTATIN CALCIUM 80 MG/1
80 TABLET, FILM COATED ORAL NIGHTLY
Qty: 30 TABLET | Refills: 3 | Status: SHIPPED | OUTPATIENT
Start: 2021-10-18 | End: 2022-01-10 | Stop reason: SDUPTHER

## 2021-10-18 RX ADMIN — Medication 81 MG: at 10:50

## 2021-10-18 RX ADMIN — ALBUTEROL SULFATE 2 PUFF: 90 AEROSOL, METERED RESPIRATORY (INHALATION) at 16:35

## 2021-10-18 RX ADMIN — ACETAMINOPHEN 650 MG: 325 TABLET ORAL at 04:05

## 2021-10-18 RX ADMIN — ATORVASTATIN CALCIUM 80 MG: 80 TABLET, FILM COATED ORAL at 20:27

## 2021-10-18 RX ADMIN — CLOPIDOGREL 75 MG: 75 TABLET, FILM COATED ORAL at 10:50

## 2021-10-18 RX ADMIN — INSULIN LISPRO 1 UNITS: 100 INJECTION, SOLUTION INTRAVENOUS; SUBCUTANEOUS at 13:58

## 2021-10-18 RX ADMIN — ENOXAPARIN SODIUM 40 MG: 40 INJECTION SUBCUTANEOUS at 10:50

## 2021-10-18 RX ADMIN — METFORMIN HYDROCHLORIDE 500 MG: 500 TABLET ORAL at 18:26

## 2021-10-18 RX ADMIN — SODIUM CHLORIDE, PRESERVATIVE FREE 10 ML: 5 INJECTION INTRAVENOUS at 20:27

## 2021-10-18 ASSESSMENT — PAIN SCALES - GENERAL
PAINLEVEL_OUTOF10: 6
PAINLEVEL_OUTOF10: 0
PAINLEVEL_OUTOF10: 6

## 2021-10-18 NOTE — PROGRESS NOTES
Physical Therapy  Facility/Department: [facility] NEURO  Daily Treatment Note  NAME: Dillon Marie  : 1965  MRN: [id number]    Date of Service: 10/18/2021    Discharge Recommendations:  Patient would benefit from continued therapy after discharge   PT Equipment Recommendations  Equipment Needed: No    Assessment   Assessment: Pt completed therapuetic exercise in supine in bed. Refused transfer training this date. AROM was performed with the RLE and AAROM was compeleted on the LLE. Pt would benefit from intensive PT following d/c to address functional deficits to regain prior level of independence. Prognosis: Good  Decision Making: Medium Complexity  PT Education: Goals;PT Role;Plan of Care;Transfer Training;Family Education; Functional Mobility Training;Gait Training;Equipment  REQUIRES PT FOLLOW UP: Yes  Activity Tolerance  Activity Tolerance: Patient Tolerated treatment well     Patient Diagnosis(es): The encounter diagnosis was Cerebrovascular accident (CVA), unspecified mechanism ([facility] has a past medical history of Hypertension and Tobacco abuse.   has no past surgical history on file. Restrictions  Restrictions/Precautions  Restrictions/Precautions: Fall Risk, Up as Tolerated  Required Braces or Orthoses?: No  Position Activity Restriction  Other position/activity restrictions: L side deficits, SBP <220  Subjective   General  Chart Reviewed: Yes  Response To Previous Treatment: Patient with no complaints from previous session. Family / Caregiver Present: No  Subjective  Subjective: RN and pt agreeable to PT. Pt supine in bed upon arrival, very pleasant and cooperative throughout. General Comment  Comments: Pt remained supine in bed post session.   Pain Screening  Patient Currently in Pain: Yes  Pain Assessment  Pain Level: 6  Vital Signs  Patient Currently in Pain: Yes       Orientation  Orientation  Overall Orientation Status: Within Functional Limits  Cognition   Cognition  Overall Cognitive Status: Concurrent Group Co-treatment   Time In 1114         Time Out 1137         Minutes 23            Variance: [address] [place], [place]

## 2021-10-18 NOTE — CARE COORDINATION
PHQ-9  Met with pt this date to assess for support and needs  Pt presents with Left upper and lower extremity weakness and numbness. Pt denies any any feelings of depression or suicidal thoughts at this time. Pt states his dtr, son and grd dtr reside with him      Patient Health Questionnaire-9 (PHQ-9)    Over the last 2 weeks, how often have you been bothered by any of the following problems? 1. Little Interest or pleasure in doing things? [x] Not at all  [] Several Days  [] More than half the day  []  Nearly every day    2. Feeling down, depressed or hopeless? [] Not at all  [] Several Days  [] More than half the day  []  Nearly every day    3. Trouble falling or staying asleep, or sleeping too much? [x] Not at all  [] Several Days  [] More than half the day  []  Nearly every day    4. Feeling tired or having little energy? [x] Not at all  [] Several Days  [] More than half the day  []  Nearly every day    5. Poor apettite or overeating? [x] Not at all  [] Several Days  [] More than half the day  []  Nearly every day    6. Feeling bad about yourself-or that you are a failure or have let yourself or your family down? [x] Not at all  [] Several Days  [] More than half the day  []  Nearly every day    7. Trouble concentrating on things, such as reading the newspaper or watching television? [x] Not at all  [] Several Days  [] More than half the day  []  Nearly every day    8. Moving or speaking so slowly that other people could have noticed? Or the opposite-being so fidgety or restless that you have been moving around a lot more than usual?   [x] Not at all  [] Several Days  [] More than half the day  []  Nearly every day    9. Thoughts that you would be better off dead or of hurting yourself in some way?    [x] Not at all  [] Several Days  [] More than half the day  []  Nearly every day    Total Score: 0    If you checked off any problems, how difficult have these problems made it for you to do your work, take care of things at home, or get along with other people?    [x] Not difficult at all  [] Somewhat Difficult  [] Very Difficult  []  Extremely Difficult

## 2021-10-18 NOTE — PROGRESS NOTES
Occupational Therapy  Facility/Department: [facility] NEURO  Daily Treatment Note  NAME: Tiffani Brown  : 1965  MRN: [id number]    Date of Service: 10/18/2021    Discharge Recommendations: Further therapy recommended at discharge. The patient should be able to tolerate at least three hours of therapy per day over 5 days or 15 hours over 7 days. Patient would benefit from continued therapy after discharge       Assessment   Performance deficits / Impairments: Decreased functional mobility ; Decreased ADL status; Decreased ROM; Decreased strength;Decreased safe awareness;Decreased cognition;Decreased sensation;Decreased balance;Decreased high-level IADLs;Decreased fine motor control;Decreased coordination;Decreased posture  Assessment: Pt will benefit from continued intensive OT services to address deficits listed in order for pt to progress toward independence with ADLs/IADLs and functional transfers/mobility. Treatment Diagnosis: CVA  Prognosis: Good  REQUIRES OT FOLLOW UP: Yes  Activity Tolerance  Activity Tolerance: Patient Tolerated treatment well;Patient limited by fatigue  Activity Tolerance: Pt is very motivated to do therapy  Safety Devices  Safety Devices in place: Yes  Type of devices: Call light within reach; Patient at risk for falls; Left in chair;Nurse notified         Patient Diagnosis(es): The encounter diagnosis was Cerebrovascular accident (CVA), unspecified mechanism ([facility] [address] has a past medical history of Hypertension and Tobacco abuse.   has no past surgical history on file.     Restrictions  Restrictions/Precautions  Restrictions/Precautions: General Precautions, Fall Risk, Up as Tolerated  Required Braces or Orthoses?: No  Position Activity Restriction  Other position/activity restrictions: L side deficits, SBP <220  Subjective   General  Patient assessed for rehabilitation services?: Yes  Family / Caregiver Present: Yes (son present throughout tx)  General Comment  Comments: RN ok'd pt for OT tx this date. Pt agreeable to session and pleasant/cooperative throughout. Pt very motivated for therapy. Pain Assessment  Response to Pain Intervention: Patient Satisfied  Vital Signs  Patient Currently in Pain: Denies   Orientation  Orientation  Overall Orientation Status: Within Normal Limits  Objective    ADL  Feeding: Minimal assistance;Setup; Dentures; Increased time to complete (assist to open some containers, able to feed self)  Grooming: Stand by assistance;Setup;Verbal cueing; Increased time to complete (wash face/hair, has dentures-cleaned earlier)  UE Bathing: Moderate assistance;Setup;Verbal cueing; Increased time to complete (assist to wash back, underarms and RUE )  LE Bathing: Minimal assistance;Setup;Verbal cueing; Increased time to complete (assist to wash feet)  UE Dressing: Minimal assistance;Setup;Verbal cueing; Increased time to complete (assist to doff/don pullover t-shirt)  LE Dressing: Maximum assistance;Setup;Verbal cueing; Increased time to complete (assist to doff/don footies and pj bottoms)  Toileting: Minimal assistance;Setup; Increased time to complete (setup w/urinal and able to use standing in SS)      Pt up in chair upon arrival. Setup at tray table for self care (see above for LOF). Pt needed assist and increased time to complete d/t fatigue and limited use of left U/LE. STS w/SS x4 to stand and use urinal, doff/don pj bottoms, complete nasim care with assist to wash bottom. Pt's left knee buckling off and on throughout standing. Pt demo very high motivation to do what needs to be done to get better. Pt retired to [employer], lunch tray arrived and setup on tray table. Pt able to open ketchup packets using teeth and able to feed self. Call light and phone in reach. Educ on AE/DME for home to pt and son. RN notified. Balance  Sitting Balance: Stand by assistance (sitting unsupported in recliner for approx 45 min)  Standing Balance:  Moderate assistance (w/assist to keep left hand on grab bar Individual Concurrent Group Co-treatment   Time In  1420         Time Out  1550         Minutes  90 total tx time                 [address]

## 2021-10-18 NOTE — PLAN OF CARE
Problem: Skin Integrity:  Goal: Will show no infection signs and symptoms  Description: Will show no infection signs and symptoms  10/18/2021 0305 by Kendall Amador RN  Outcome: Ongoing  10/17/2021 1756 by Mike Hall RN  Outcome: Ongoing  Goal: Absence of new skin breakdown  Description: Absence of new skin breakdown  10/18/2021 0305 by Kendall Amador RN  Outcome: Ongoing  10/17/2021 1756 by Mike Hall RN  Outcome: Ongoing     Problem: Falls - Risk of:  Goal: Will remain free from falls  Description: Will remain free from falls  10/18/2021 0305 by Kendall Amador RN  Outcome: Ongoing  10/17/2021 1756 by Mike Hall RN  Outcome: Ongoing  Goal: Absence of physical injury  Description: Absence of physical injury  10/18/2021 0305 by Kendall Amador RN  Outcome: Ongoing  10/17/2021 1756 by Mike Hall RN  Outcome: Ongoing     Problem: HEMODYNAMIC STATUS  Goal: Patient has stable vital signs and fluid balance  10/18/2021 0305 by Kendall Amador RN  Outcome: Ongoing  10/17/2021 1756 by Mike Hall RN  Outcome: Ongoing     Problem: ACTIVITY INTOLERANCE/IMPAIRED MOBILITY  Goal: Mobility/activity is maintained at optimum level for patient  10/18/2021 0305 by Kendall Amador RN  Outcome: Ongoing  10/17/2021 1756 by Mike Hall RN  Outcome: Ongoing     Problem: Pain:  Goal: Pain level will decrease  Description: Pain level will decrease  10/18/2021 0305 by Kendall Amador RN  Outcome: Ongoing  10/17/2021 1756 by Mike Hall RN  Outcome: Ongoing  Goal: Control of acute pain  Description: Control of acute pain  10/18/2021 0305 by Kendall Amador RN  Outcome: Ongoing  10/17/2021 1756 by Mike Hall RN  Outcome: Ongoing  Goal: Control of chronic pain  Description: Control of chronic pain  10/18/2021 0305 by Kendall Amador RN  Outcome: Ongoing  10/17/2021 1756 by Mike Hall RN  Outcome: Ongoing

## 2021-10-18 NOTE — PROGRESS NOTES
[facility]   [facility]    Progress Note             Date:   10/18/2021  Patient name:  Anahi Worthington  Date of admission:  10/14/2021  4:07 PM  MRN:   [id number]  Account:  [de-identified]  YOB: 1965  PCP:    No primary care provider on file. Room:   [address] [place] [address]  Code Status:    Full Code    Chief Complaint:     Chief Complaint   Patient presents with    Cerebrovascular Accident       Interval hx: The patient was seen and examined at bedside. Is vitally stable, alert and oriented x 4. No significant events over night  Blood pressure is running on the higher side   BMP within normal limits  Platelets are normal  Patient is still waiting to get the Echo- Going now    [employer] recommending acute rehab, case discussed with  and working on rehab placement near patient's home. Brief History of Present Illness: The patient is a 60 y.o. male with past medical history of hypertension, chronic smoker, who presents with Left upper and lower extremity weakness and numbness. Last known well 6 AM.  The patient is not on blood thinners. Initial NIHSS of 2. Initial systolic blood pressure was more than 200. Blood sugar within normal meds. The patient is working the third shift, he said that around 6 AM he started to feel abnormal, then followed by left-sided weakness. He denied double vision, change in speech, facial droop, right-sided weakness, seizures or loss of consciousness, dizziness, headaches, nausea or vomiting, change in bowel or urinary habits. Neuro exam revealed left upper extremity drift with no weakness, mild decrease in sensation in the left upper and lower extremity. Will get stat CT head along with stat CT of the neck with contrast.    Past Medical History:     Past Medical History:   Diagnosis Date    Hypertension     Tobacco abuse         Past Surgical History:     No past surgical history on file.      Medications Prior to Admission:     Prior to Admission medications    Medication Sig Start Date End Date Taking? Authorizing Provider   albuterol sulfate HFA (VENTOLIN HFA) 108 (90 Base) MCG/ACT inhaler Inhale 2 puffs into the lungs every 6 hours as needed for Wheezing 6/2/21  Yes CHRISTAL North - CNP   loratadine (CLARITIN) 10 MG tablet Take 10 mg by mouth daily. Yes Historical Provider, MD   lisinopril (PRINIVIL;ZESTRIL) 10 MG tablet Take 1 tablet by mouth daily 12/30/17   Nixon Gomez   Chlorpheniramine-PSE-Ibuprofen (ADVIL ALLERGY SINUS PO) Take  by mouth. Historical Provider, MD   ibuprofen (IBU) 800 MG tablet Take 1 tablet by mouth every 8 hours as needed for Pain. 9/27/13   JAMIL Gomez        Allergies:     Penicillins    Social History:     Tobacco:    reports that he has been smoking cigarettes. He has a 30.00 pack-year smoking history. He has never used smokeless tobacco.  Alcohol:      reports current alcohol use. Drug Use:  reports no history of drug use. Family History:     No family history on file. Review of Systems:   Review of Systems   Constitutional: Negative. Negative for activity change, appetite change, chills, diaphoresis, fatigue, fever and unexpected weight change. HENT: Negative. Negative for congestion, facial swelling, hearing loss, postnasal drip, rhinorrhea, sinus pain, tinnitus and voice change. Eyes: Negative. Negative for photophobia, pain, discharge, redness, itching and visual disturbance. Respiratory: Negative. Negative for apnea, cough, choking, chest tightness, shortness of breath, wheezing and stridor. Cardiovascular: Negative. Negative for chest pain, palpitations and leg swelling. Gastrointestinal: Negative. Negative for abdominal distention, abdominal pain, constipation, diarrhea, nausea and vomiting. Endocrine: Negative. Negative for cold intolerance, heat intolerance and polyuria. Genitourinary: Negative.   Negative for difficulty urinating, dysuria, frequency and urgency. Musculoskeletal: Negative. Negative for arthralgias, back pain, gait problem and myalgias. Skin: Negative. Negative for color change, pallor, rash and wound. Allergic/Immunologic: Negative. Neurological: Positive for weakness (Left-sided). Negative for dizziness, tremors, seizures, syncope, facial asymmetry, speech difficulty, light-headedness, numbness and headaches. Hematological: Negative. Negative for adenopathy. Does not bruise/bleed easily. Psychiatric/Behavioral: Negative. Negative for agitation, behavioral problems, confusion, decreased concentration, dysphoric mood, hallucinations, self-injury, sleep disturbance and suicidal ideas. The patient is not nervous/anxious and is not hyperactive. All other systems reviewed and are negative.       Physical Exam:   BP (!) 142/82   Pulse 67   Temp 97.7 °F (36.5 °C) (Oral)   Resp 13   Ht 5' 9.5\" (1.765 m)   Wt 209 lb 14.1 oz (95.2 kg)   SpO2 93%   BMI 30.55 kg/m²   Temp (24hrs), Av.8 °F (36.6 °C), Min:97.6 °F (36.4 °C), Max:98.1 °F (36.7 °C)    Recent Labs     10/17/21  0742 10/17/21  1214 10/17/21  1600 10/17/21  2031   POCGLU 146* 150* 158* 143*       Intake/Output Summary (Last 24 hours) at 10/18/2021 4752  Last data filed at 10/18/2021 0452  Gross per 24 hour   Intake --   Output 700 ml   Net -700 ml           GENERAL  Appears comfortable and in no distress   HEENT  NC/ AT   HEART  S1 and S2 heard; palpation of pulses: radial pulse    NECK  Supple and no bruits heard   MENTAL STATUS:  Alert, oriented, intact memory, no confusion, normal speech, normal language, no hallucination or delusion   CRANIAL NERVES: II     -      Visual fields intact to confrontation  III,IV,VI -  PERR, EOMs full, no ptosis  V     -     Normal facial sensation   VII    -     Normal facial symmetry  VIII   -     Intact hearing   IX,X -     Symmetrical palate  XI    -     Symmetrical shoulder shrug  XII   -     Midline tongue, no atrophy    MOTOR FUNCTION: RUE: Significant for good strength of grade 5/5 in proximal and distal muscle groups   LUE: Significant for good strength of grade 3/5 in proximal and distal muscle groups   RLE: Significant for good strength of grade 5/5 in proximal and distal muscle groups   LLE: Significant for good strength of grade 5/5 in proximal and 3/5 in distal muscle groups      Normal bulk, normal tone and no involuntary movements, no tremor   SENSORY FUNCTION:  Normal touch, normal pinprick, normal vibration, normal proprioception   CEREBELLAR FUNCTION:  Intact fine motor control over upper limbs and lower limbs   REFLEX FUNCTION:  Symmetric in upper and lower extremities, no Babinski sign   STATION and GAIT  Normal gait and tandem station, normal tip toes and heel walking       Investigations:      Laboratory Testing:  Recent Results (from the past 24 hour(s))   POC Glucose Fingerstick    Collection Time: 10/17/21 12:14 PM   Result Value Ref Range    POC Glucose 150 (H) 75 - 110 mg/dL   CBC WITH AUTO DIFFERENTIAL    Collection Time: 10/17/21  2:55 PM   Result Value Ref Range    WBC 6.9 3.5 - 11.3 k/uL    RBC 5.30 4.21 - 5.77 m/uL    Hemoglobin 16.3 13.0 - 17.0 g/dL    Hematocrit 50.2 40.7 - 50.3 %    MCV 94.7 82.6 - 102.9 fL    MCH 30.8 25.2 - 33.5 pg    MCHC 32.5 28.4 - 34.8 g/dL    RDW 12.7 11.8 - 14.4 %    Platelets 418 790 - 494 k/uL    MPV 9.0 8.1 - 13.5 fL    NRBC Automated 0.0 0.0 per 100 WBC    Differential Type NOT REPORTED     Seg Neutrophils 54 36 - 65 %    Lymphocytes 35 24 - 43 %    Monocytes 8 3 - 12 %    Eosinophils % 2 1 - 4 %    Basophils 1 0 - 2 %    Immature Granulocytes 0 0 %    Segs Absolute 3.70 1.50 - 8.10 k/uL    Absolute Lymph # 2.44 1.10 - 3.70 k/uL    Absolute Mono # 0.54 0.10 - 1.20 k/uL    Absolute Eos # 0.15 0.00 - 0.44 k/uL    Basophils Absolute 0.07 0.00 - 0.20 k/uL    Absolute Immature Granulocyte <0.03 0.00 - 0.30 k/uL    WBC Morphology NOT REPORTED     RBC Morphology NOT REPORTED     Platelet Estimate NOT REPORTED    POC Glucose Fingerstick    Collection Time: 10/17/21  4:00 PM   Result Value Ref Range    POC Glucose 158 (H) 75 - 110 mg/dL   POC Glucose Fingerstick    Collection Time: 10/17/21  8:31 PM   Result Value Ref Range    POC Glucose 143 (H) 75 - 110 mg/dL   CBC WITH AUTO DIFFERENTIAL    Collection Time: 10/18/21  4:52 AM   Result Value Ref Range    WBC 6.9 3.5 - 11.3 k/uL    RBC 5.20 4. 21 - 5.77 m/uL    Hemoglobin 16.0 13.0 - 17.0 g/dL    Hematocrit 48.4 40.7 - 50.3 %    MCV 93.1 82.6 - 102.9 fL    MCH 30.8 25.2 - 33.5 pg    MCHC 33.1 28.4 - 34.8 g/dL    RDW 12.5 11.8 - 14.4 %    Platelets 780 569 - 785 k/uL    MPV 9.3 8.1 - 13.5 fL    NRBC Automated 0.0 0.0 per 100 WBC    Differential Type NOT REPORTED     Seg Neutrophils 51 36 - 65 %    Lymphocytes 37 24 - 43 %    Monocytes 8 3 - 12 %    Eosinophils % 3 1 - 4 %    Basophils 1 0 - 2 %    Immature Granulocytes 0 0 %    Segs Absolute 3.40 1.50 - 8.10 k/uL    Absolute Lymph # 2.58 1.10 - 3.70 k/uL    Absolute Mono # 0.57 0.10 - 1.20 k/uL    Absolute Eos # 0.23 0.00 - 0.44 k/uL    Basophils Absolute 0.09 0.00 - 0.20 k/uL    Absolute Immature Granulocyte <0.03 0.00 - 0.30 k/uL    WBC Morphology NOT REPORTED     RBC Morphology NOT REPORTED     Platelet Estimate NOT REPORTED    Basic Metabolic Panel w/ Reflex to MG    Collection Time: 10/18/21  4:52 AM   Result Value Ref Range    Glucose 143 (H) 70 - 99 mg/dL    BUN 18 6 - 20 mg/dL    CREATININE 0.94 0.70 - 1.20 mg/dL    Bun/Cre Ratio NOT REPORTED 9 - 20    Calcium 8.8 8.6 - 10.4 mg/dL    Sodium 137 135 - 144 mmol/L    Potassium 4.1 3.7 - 5.3 mmol/L    Chloride 104 98 - 107 mmol/L    CO2 22 20 - 31 mmol/L    Anion Gap 11 9 - 17 mmol/L    GFR Non-African American >60 >60 mL/min    GFR African American >60 >60 mL/min    GFR Comment          GFR Staging NOT REPORTED      Recent Labs     10/18/21  0452   WBC 6.9   RBC 5.20   HGB 16.0   HCT 48.4   MCV 93.1   MCH 30.8   MCHC 33.1   RDW 12.5      MPV 9.3     Recent Labs     10/18/21  0452      K 4.1      CO2 22   BUN 18   CREATININE 0.94   GLUCOSE 143*   CALCIUM 8.8     Hemoglobin A1C   Date Value Ref Range Status   10/15/2021 7.7 (H) 4.0 - 6.0 % Final       Assessment :      Primary Problem  <principal problem not specified>    Active Hospital Problems    Diagnosis Date Noted    Cerebrovascular accident (CVA) ([facility] [address] [I63.9]     Stroke-like symptom [R29.90] 10/14/2021       Patient is a 60 y.o. male presented with left upper and lower extremity weakness, foot drop. Patient presented as a stroke alert, NIH 2 at presentation. Not a candidate for TPA, CT head unremarkable, CTA head and neck showed focal calcification of the origin of the left vertebral artery. MRI brain shows acute/subacute ischemia in the right basal ganglia region and echocardiogram pending. Stroke work-up ongoing. Symptoms slightly worsened. Loaded with aspirin and Plavix. Acute/subacute right basal ganglia stroke  Hypertension  Type  2 DM - HBA1C 7.7  Plan:     MRI brain without contrast: Acute/subacute right basal ganglia stroke. Echocardiogram pending  Continue aspirin 81 mg and Plavix 75 mg daily, loaded with aspirin and Plavix  Continue Lipitor 80 mg nightly  LDL 97, HbA1c 7.7  Repeat platelets counts are normal  PT/OT/ST  PMNR consulted: Recommending acute rehab  SBP goal less than 220   We will complete hypercoagulable work-up as outpatient  Echo with bubble studies- follow up on the report  Discharge planning ongoing,  evaluating rehab options close to patient's home  Lovenox for DVT prophylaxis      Follow-up further recommendations after discussing the case with attending  The plan was discussed with the patient, patient's family and the medical staff.    Consultations:   IP CONSULT TO STROKE TEAM  IP CONSULT TO PHYSICAL MEDICINE REHAB    Patient is admitted as inpatient status because of co-morbidities listed above, severity of signs and symptoms as outlined, requirement for current medical therapies and most importantly because of direct risk to patient if care not provided in a hospital setting. Veronica Nunez MD ,  PGY-2, Neurology Resident,  10/18/2021,  8:21 AM.    Copy sent to Dr. Collins primary care provider on file.

## 2021-10-19 VITALS
SYSTOLIC BLOOD PRESSURE: 145 MMHG | RESPIRATION RATE: 22 BRPM | WEIGHT: 209.88 LBS | DIASTOLIC BLOOD PRESSURE: 97 MMHG | TEMPERATURE: 97.8 F | BODY MASS INDEX: 30.05 KG/M2 | OXYGEN SATURATION: 93 % | HEIGHT: 70 IN | HEART RATE: 72 BPM

## 2021-10-19 LAB
ABSOLUTE EOS #: 0.19 K/UL (ref 0–0.44)
ABSOLUTE IMMATURE GRANULOCYTE: 0.03 K/UL (ref 0–0.3)
ABSOLUTE LYMPH #: 2.42 K/UL (ref 1.1–3.7)
ABSOLUTE MONO #: 0.56 K/UL (ref 0.1–1.2)
ANION GAP SERPL CALCULATED.3IONS-SCNC: 13 MMOL/L (ref 9–17)
BASOPHILS # BLD: 1 % (ref 0–2)
BASOPHILS ABSOLUTE: 0.09 K/UL (ref 0–0.2)
BUN BLDV-MCNC: 20 MG/DL (ref 6–20)
BUN/CREAT BLD: ABNORMAL (ref 9–20)
CALCIUM SERPL-MCNC: 8.9 MG/DL (ref 8.6–10.4)
CHLORIDE BLD-SCNC: 103 MMOL/L (ref 98–107)
CO2: 19 MMOL/L (ref 20–31)
CREAT SERPL-MCNC: 0.93 MG/DL (ref 0.7–1.2)
DIFFERENTIAL TYPE: ABNORMAL
EOSINOPHILS RELATIVE PERCENT: 2 % (ref 1–4)
GFR AFRICAN AMERICAN: >60 ML/MIN
GFR NON-AFRICAN AMERICAN: >60 ML/MIN
GFR SERPL CREATININE-BSD FRML MDRD: ABNORMAL ML/MIN/{1.73_M2}
GFR SERPL CREATININE-BSD FRML MDRD: ABNORMAL ML/MIN/{1.73_M2}
GLUCOSE BLD-MCNC: 146 MG/DL (ref 75–110)
GLUCOSE BLD-MCNC: 160 MG/DL (ref 70–99)
GLUCOSE BLD-MCNC: 164 MG/DL (ref 75–110)
HCT VFR BLD CALC: 49.9 % (ref 40.7–50.3)
HEMOGLOBIN: 16.4 G/DL (ref 13–17)
IMMATURE GRANULOCYTES: 0 %
LYMPHOCYTES # BLD: 25 % (ref 24–43)
MCH RBC QN AUTO: 30.6 PG (ref 25.2–33.5)
MCHC RBC AUTO-ENTMCNC: 32.9 G/DL (ref 28.4–34.8)
MCV RBC AUTO: 93.1 FL (ref 82.6–102.9)
MONOCYTES # BLD: 6 % (ref 3–12)
NRBC AUTOMATED: 0 PER 100 WBC
PDW BLD-RTO: 12.6 % (ref 11.8–14.4)
PLATELET # BLD: 208 K/UL (ref 138–453)
PLATELET ESTIMATE: ABNORMAL
PMV BLD AUTO: 9.1 FL (ref 8.1–13.5)
POTASSIUM SERPL-SCNC: 4.4 MMOL/L (ref 3.7–5.3)
RBC # BLD: 5.36 M/UL (ref 4.21–5.77)
RBC # BLD: ABNORMAL 10*6/UL
SARS-COV-2, RAPID: NOT DETECTED
SEG NEUTROPHILS: 66 % (ref 36–65)
SEGMENTED NEUTROPHILS ABSOLUTE COUNT: 6.39 K/UL (ref 1.5–8.1)
SODIUM BLD-SCNC: 135 MMOL/L (ref 135–144)
SPECIMEN DESCRIPTION: NORMAL
WBC # BLD: 9.7 K/UL (ref 3.5–11.3)
WBC # BLD: ABNORMAL 10*3/UL

## 2021-10-19 PROCEDURE — 2580000003 HC RX 258: Performed by: STUDENT IN AN ORGANIZED HEALTH CARE EDUCATION/TRAINING PROGRAM

## 2021-10-19 PROCEDURE — 99239 HOSP IP/OBS DSCHRG MGMT >30: CPT | Performed by: PSYCHIATRY & NEUROLOGY

## 2021-10-19 PROCEDURE — 85025 COMPLETE CBC W/AUTO DIFF WBC: CPT

## 2021-10-19 PROCEDURE — 82947 ASSAY GLUCOSE BLOOD QUANT: CPT

## 2021-10-19 PROCEDURE — 87635 SARS-COV-2 COVID-19 AMP PRB: CPT

## 2021-10-19 PROCEDURE — 6370000000 HC RX 637 (ALT 250 FOR IP): Performed by: STUDENT IN AN ORGANIZED HEALTH CARE EDUCATION/TRAINING PROGRAM

## 2021-10-19 PROCEDURE — 80048 BASIC METABOLIC PNL TOTAL CA: CPT

## 2021-10-19 PROCEDURE — 97535 SELF CARE MNGMENT TRAINING: CPT

## 2021-10-19 PROCEDURE — 6360000002 HC RX W HCPCS: Performed by: STUDENT IN AN ORGANIZED HEALTH CARE EDUCATION/TRAINING PROGRAM

## 2021-10-19 PROCEDURE — 36415 COLL VENOUS BLD VENIPUNCTURE: CPT

## 2021-10-19 RX ORDER — LISINOPRIL 10 MG/1
10 TABLET ORAL DAILY
Status: DISCONTINUED | OUTPATIENT
Start: 2021-10-19 | End: 2021-10-19 | Stop reason: HOSPADM

## 2021-10-19 RX ADMIN — SODIUM CHLORIDE, PRESERVATIVE FREE 10 ML: 5 INJECTION INTRAVENOUS at 09:23

## 2021-10-19 RX ADMIN — INSULIN LISPRO 1 UNITS: 100 INJECTION, SOLUTION INTRAVENOUS; SUBCUTANEOUS at 12:44

## 2021-10-19 RX ADMIN — INSULIN LISPRO 1 UNITS: 100 INJECTION, SOLUTION INTRAVENOUS; SUBCUTANEOUS at 09:22

## 2021-10-19 RX ADMIN — Medication 81 MG: at 09:22

## 2021-10-19 RX ADMIN — ENOXAPARIN SODIUM 40 MG: 40 INJECTION SUBCUTANEOUS at 09:22

## 2021-10-19 RX ADMIN — CLOPIDOGREL 75 MG: 75 TABLET, FILM COATED ORAL at 09:22

## 2021-10-19 NOTE — PROGRESS NOTES
[facility]   [address] [place] [address]    Progress Note             Date:   10/19/2021  Patient name:  Anahi Worthington  Date of admission:  10/14/2021  4:07 PM  MRN:   [id number]  Account:  [de-identified]  YOB: 1965  PCP:    No primary care provider on file. Room:   [address] [place] [address]  Code Status:    Full Code    Chief Complaint:     Chief Complaint   Patient presents with    Cerebrovascular Accident       Interval hx: The patient was seen and examined at bedside. Is vitally stable, alert and oriented x 4. No significant events over night  Patient blood pressure is running on the higher side-started 10 mg of Lisinopril  Echo done Yesterday with  Negative bubble studies  Patient started on Metformin 500 mg BID for Type 2 DM  Still waiting on the precert  PMNR recommending acute rehab, case discussed with  and working on rehab placement near patient's home. Brief History of Present Illness: The patient is a 60 y.o. male with past medical history of hypertension, chronic smoker, who presents with Left upper and lower extremity weakness and numbness. Last known well 6 AM.  The patient is not on blood thinners. Initial NIHSS of 2. Initial systolic blood pressure was more than 200. Blood sugar within normal meds. The patient is working the third shift, he said that around 6 AM he started to feel abnormal, then followed by left-sided weakness. He denied double vision, change in speech, facial droop, right-sided weakness, seizures or loss of consciousness, dizziness, headaches, nausea or vomiting, change in bowel or urinary habits. Neuro exam revealed left upper extremity drift with no weakness, mild decrease in sensation in the left upper and lower extremity.   Will get stat CT head along with stat CT of the neck with contrast.    Past Medical History:     Past Medical History:   Diagnosis Date    Hypertension     Tobacco abuse         Past Surgical History:     No past surgical history on file. Medications Prior to Admission:     Prior to Admission medications    Medication Sig Start Date End Date Taking? Authorizing Provider   aspirin 81 MG EC tablet Take 1 tablet by mouth daily 10/19/21  Yes Michelle Rhodes MD   atorvastatin (LIPITOR) 80 MG tablet Take 1 tablet by mouth nightly 10/18/21  Yes Michelle Rhodes MD   clopidogrel (PLAVIX) 75 MG tablet Take 1 tablet by mouth daily for 17 doses 10/19/21 11/5/21 Yes Michelle Rhodes MD   metFORMIN (GLUCOPHAGE) 500 MG tablet Take 1 tablet by mouth 2 times daily (with meals) 10/18/21  Yes Michelle Rhodes MD   albuterol sulfate HFA (VENTOLIN HFA) 108 (90 Base) MCG/ACT inhaler Inhale 2 puffs into the lungs every 6 hours as needed for Wheezing 6/2/21  Yes CHRISTAL Hester - CNP   loratadine (CLARITIN) 10 MG tablet Take 10 mg by mouth daily. Yes Historical Provider, MD   lisinopril (PRINIVIL;ZESTRIL) 10 MG tablet Take 1 tablet by mouth daily 12/30/17   Blanco Cousins, [address]   Chlorpheniramine-PSE-Ibuprofen (ADVIL ALLERGY SINUS PO) Take  by mouth. Historical Provider, MD        Allergies:     Penicillins    Social History:     Tobacco:    reports that he has been smoking cigarettes. He has a 30.00 pack-year smoking history. He has never used smokeless tobacco.  Alcohol:      reports current alcohol use. Drug Use:  reports no history of drug use. Family History:     No family history on file. Review of Systems:   Review of Systems   Constitutional: Negative. Negative for activity change, appetite change, chills, diaphoresis, fatigue, fever and unexpected weight change. HENT: Negative. Negative for congestion, facial swelling, hearing loss, postnasal drip, rhinorrhea, sinus pain, tinnitus and voice change. Eyes: Negative. Negative for photophobia, pain, discharge, redness, itching and visual disturbance. Respiratory: Negative.   Negative for apnea, cough, choking, chest tightness, shortness of breath, wheezing and speech, normal language, no hallucination or delusion   CRANIAL NERVES: II     -      Visual fields intact to confrontation  III,IV,VI -  PERR, EOMs full, no ptosis  V     -     Normal facial sensation   VII    -     Normal facial symmetry  VIII   -     Intact hearing   IX,X -     Symmetrical palate  XI    -     Symmetrical shoulder shrug  XII   -     Midline tongue, no atrophy    MOTOR FUNCTION: RUE: Significant for good strength of grade 5/5 in proximal and distal muscle groups   LUE: Significant for good strength of grade 3/5 in proximal and distal muscle groups   RLE: Significant for good strength of grade 5/5 in proximal and distal muscle groups   LLE: Significant for good strength of grade 5/5 in proximal and 3/5 in distal muscle groups      Normal bulk, normal tone and no involuntary movements, no tremor   SENSORY FUNCTION:  Normal touch, normal pinprick, normal vibration, normal proprioception   CEREBELLAR FUNCTION:  Intact fine motor control over upper limbs and lower limbs   REFLEX FUNCTION:  Symmetric in upper and lower extremities, no Babinski sign   STATION and GAIT  Normal gait and tandem station, normal tip toes and heel walking       Investigations:      Laboratory Testing:  Recent Results (from the past 24 hour(s))   POC Glucose Fingerstick    Collection Time: 10/18/21  9:41 AM   Result Value Ref Range    POC Glucose 135 (H) 75 - 110 mg/dL   POC Glucose Fingerstick    Collection Time: 10/18/21  1:09 PM   Result Value Ref Range    POC Glucose 162 (H) 75 - 110 mg/dL   POC Glucose Fingerstick    Collection Time: 10/18/21  5:26 PM   Result Value Ref Range    POC Glucose 168 (H) 75 - 110 mg/dL   POC Glucose Fingerstick    Collection Time: 10/18/21  8:31 PM   Result Value Ref Range    POC Glucose 156 (H) 75 - 110 mg/dL   CBC WITH AUTO DIFFERENTIAL    Collection Time: 10/19/21  6:16 AM   Result Value Ref Range    WBC 9.7 3.5 - 11.3 k/uL    RBC 5.36 4.21 - 5.77 m/uL    Hemoglobin 16.4 13.0 - 17.0 g/dL Hematocrit 49.9 40.7 - 50.3 %    MCV 93.1 82.6 - 102.9 fL    MCH 30.6 25.2 - 33.5 pg    MCHC 32.9 28.4 - 34.8 g/dL    RDW 12.6 11.8 - 14.4 %    Platelets 458 107 - 252 k/uL    MPV 9.1 8.1 - 13.5 fL    NRBC Automated 0.0 0.0 per 100 WBC    Differential Type NOT REPORTED     Seg Neutrophils 66 (H) 36 - 65 %    Lymphocytes 25 24 - 43 %    Monocytes 6 3 - 12 %    Eosinophils % 2 1 - 4 %    Basophils 1 0 - 2 %    Immature Granulocytes 0 0 %    Segs Absolute 6.39 1.50 - 8.10 k/uL    Absolute Lymph # 2.42 1.10 - 3.70 k/uL    Absolute Mono # 0.56 0.10 - 1.20 k/uL    Absolute Eos # 0.19 0.00 - 0.44 k/uL    Basophils Absolute 0.09 0.00 - 0.20 k/uL    Absolute Immature Granulocyte 0.03 0.00 - 0.30 k/uL    WBC Morphology NOT REPORTED     RBC Morphology NOT REPORTED     Platelet Estimate NOT REPORTED    Basic Metabolic Panel w/ Reflex to MG    Collection Time: 10/19/21  6:16 AM   Result Value Ref Range    Glucose 160 (H) 70 - 99 mg/dL    BUN 20 6 - 20 mg/dL    CREATININE 0.93 0.70 - 1.20 mg/dL    Bun/Cre Ratio NOT REPORTED 9 - 20    Calcium 8.9 8.6 - 10.4 mg/dL    Sodium 135 135 - 144 mmol/L    Potassium 4.4 3.7 - 5.3 mmol/L    Chloride 103 98 - 107 mmol/L    CO2 19 (L) 20 - 31 mmol/L    Anion Gap 13 9 - 17 mmol/L    GFR Non-African American >60 >60 mL/min    GFR African American >60 >60 mL/min    GFR Comment          GFR Staging NOT REPORTED    POC Glucose Fingerstick    Collection Time: 10/19/21  7:51 AM   Result Value Ref Range    POC Glucose 164 (H) 75 - 110 mg/dL     Recent Labs     10/19/21  0616   WBC 9.7   RBC 5.36   HGB 16.4   HCT 49.9   MCV 93.1   MCH 30.6   MCHC 32.9   RDW 12.6      MPV 9.1     Recent Labs     10/19/21  0616      K 4.4      CO2 19*   BUN 20   CREATININE 0.93   GLUCOSE 160*   CALCIUM 8.9     Hemoglobin A1C   Date Value Ref Range Status   10/15/2021 7.7 (H) 4.0 - 6.0 % Final       Assessment :      Primary Problem  <principal problem not specified>    Active Hospital Problems Diagnosis Date Noted    Cerebrovascular accident (CVA) ([facility] [address] [I63.9]     Stroke-like symptom [R29.90] 10/14/2021       Patient is a 60 y.o. male presented with left upper and lower extremity weakness, foot drop. Patient presented as a stroke alert, NIH 2 at presentation. Not a candidate for TPA, CT head unremarkable, CTA head and neck showed focal calcification of the origin of the left vertebral artery. MRI brain shows acute/subacute ischemia in the right basal ganglia region and echocardiogram pending. Stroke work-up ongoing. Symptoms slightly worsened. Loaded with aspirin and Plavix. 1. Acute/subacute right basal ganglia stroke  2. Hypertension  3. Type  2 DM - HBA1C 7.7  Plan:     · MRI brain without contrast: Acute/subacute right basal ganglia stroke. · Echocardiogram with bubble studies is negative  · Continue aspirin 81 mg and Plavix 75 mg daily, loaded with aspirin and Plavix  · Continue Lipitor 80 mg nightly  · Target goal of blood pressure is < 140/90   · LDL 97, HbA1c 7.7  · PMNR consulted: Recommending acute rehab  · We will complete hypercoagulable work-up as outpatient  · Echo with bubble studies- follow up on the report  · Discharge planning ongoing-waiting on precert  · Lovenox for DVT prophylaxis      Follow-up further recommendations after discussing the case with attending  The plan was discussed with the patient, patient's family and the medical staff. Consultations:   IP CONSULT TO STROKE TEAM  IP CONSULT TO PHYSICAL MEDICINE REHAB    Patient is admitted as inpatient status because of co-morbidities listed above, severity of signs and symptoms as outlined, requirement for current medical therapies and most importantly because of direct risk to patient if care not provided in a hospital setting. Dione Bill MD ,  PGY-2, Neurology Resident,  10/19/2021,  8:57 AM.    Copy sent to Dr. Collins primary care provider on file.

## 2021-10-19 NOTE — PROGRESS NOTES
Pt has multiple questions on medication and POC. RN educated pt in detail on prescribed medications, diabetes and hypertension. Pt needs reinforcement, MD asked to come to the bedside to discuss POC further.

## 2021-10-19 NOTE — PLAN OF CARE
Problem: Skin Integrity:  Goal: Will show no infection signs and symptoms  Description: Will show no infection signs and symptoms  Outcome: Ongoing  Goal: Absence of new skin breakdown  Description: Absence of new skin breakdown  Outcome: Ongoing     Problem: Falls - Risk of:  Goal: Will remain free from falls  Description: Will remain free from falls  Outcome: Met This Shift  Goal: Absence of physical injury  Description: Absence of physical injury  Outcome: Met This Shift     Problem: HEMODYNAMIC STATUS  Goal: Patient has stable vital signs and fluid balance  Outcome: Ongoing     Problem: ACTIVITY INTOLERANCE/IMPAIRED MOBILITY  Goal: Mobility/activity is maintained at optimum level for patient  Outcome: Ongoing     Problem: Pain:  Goal: Pain level will decrease  Description: Pain level will decrease  Outcome: Ongoing  Goal: Control of acute pain  Description: Control of acute pain  Outcome: Ongoing  Goal: Control of chronic pain  Description: Control of chronic pain  Outcome: Ongoing

## 2021-10-19 NOTE — DISCHARGE INSTR - COC
Continuity of Care Form    Patient Name: Tavia España   :  1965  MRN:  [id number]    Admit date:  10/14/2021  Discharge date:  10/19/21    Code Status Order: Full Code   Advance Directives:      Admitting Physician:  Collette Hahn, MD  PCP: No primary care provider on file. Discharging Nurse:   [address] Unit/Room#: 1151/9008-17  Discharging Unit Phone Number: [phone]    Emergency Contact:   Extended Emergency Contact Information  Primary Emergency Contact: Lee Ann Latif Or Eyad Garcia [address] [place] [address] Phone: [phone]  Relation: Parent    Past Surgical History:  No past surgical history on file. Immunization History: There is no immunization history on file for this patient. Active Problems:  Patient Active Problem List   Diagnosis Code    Tobacco abuse Z72.0    Stroke-like symptom R29.90    Cerebrovascular accident (CVA) ([facility] [address] I63.9       Isolation/Infection:   Isolation            No Isolation          Patient Infection Status       None to display            Nurse Assessment:  Last Vital Signs: BP (!) 145/97   Pulse 72   Temp 97.8 °F (36.6 °C) (Oral)   Resp 22   Ht 5' 9.5\" (1.765 m)   Wt 209 lb 14.1 oz (95.2 kg)   SpO2 93%   BMI 30.55 kg/m²     Last documented pain score (0-10 scale): Pain Level: 0  Last Weight:   Wt Readings from Last 1 Encounters:   10/16/21 209 lb 14.1 oz (95.2 kg)     Mental Status:  oriented and alert    IV Access:  - None    Nursing Mobility/ADLs:  Walking   Assisted  Transfer  Assisted  Bathing  Independent  Dressing  Independent  Toileting  Independent  Feeding  [address]  Independent  Med Delivery   whole    Wound Care Documentation and Therapy:        Elimination:  Continence:   · Bowel:  Yes   · Bladder: Yes  Urinary Catheter: None   Colostomy/Ileostomy/Ileal Conduit: No       Date of Last BM: 10/19/21    Intake/Output Summary (Last 24 hours) at 10/19/2021 1430  Last data filed at 10/18/2021 1607  Gross per 24 hour Intake --   Output 250 ml   Net -250 ml     I/O last 3 completed shifts:  In: -   Out: 450 [Urine:450]    Safety Concerns: At Risk for Falls    Impairments/Disabilities:      L side weakness    Nutrition Therapy:  Current Nutrition Therapy:   Oral, thin liquid    Routes of Feeding: Oral  Liquids: Thin Liquids  Daily Fluid Restriction: no  Last Modified Barium Swallow with Video (Video Swallowing Test): not done    Treatments at the Time of Hospital Discharge:   Respiratory Treatments:     Oxygen Therapy:  is not on home oxygen therapy. Ventilator:    - No ventilator support    Rehab Therapies: Physical Therapy and Occupational Therapy  Weight Bearing Status/Restrictions: No weight bearing restirctions  Other Medical Equipment (for information only, NOT a DME order): [facility] [place] [facility]  Other Treatments:     Patient's personal belongings (please select all that are sent with patient):  Dorie    RN SIGNATURE:  Electronically signed by Ethel Cr RN on 10/19/21 at 2:33 PM EDT    CASE MANAGEMENT/SOCIAL WORK SECTION    Inpatient Status Date: ***    Readmission Risk Assessment Score:  Readmission Risk              Risk of Unplanned Readmission:  9           Discharging to Facility/ [address] Details Fax Failed  FAX            [address], [place] [address] [address]       Phone: [phone]       Fax: [phone]          Dialysis Facility (if applicable)   · Name:  · Address:  · Dialysis Schedule:  · Phone:  · Fax:    / signature: Electronically signed by Tinajero RN on 10/19/21 at 3:44 PM EDT    PHYSICIAN SECTION    Prognosis: Fair    Condition at Discharge: Stable    Rehab Potential (if transferring to Rehab):  Fair    Recommended Labs or Other Treatments After Discharge:     Physician Certification: I certify the above information and transfer of Blase Shown  is necessary for the continuing treatment of the diagnosis listed and that he requires Acute Rehab for greater 30 days.     Update Admission H&P: No change in H&P    PHYSICIAN SIGNATURE:  Electronically signed by Marquez Eller MD on 10/19/21 at 3:37 PM EDT

## 2021-10-19 NOTE — PROGRESS NOTES
Physical Therapy  Facility/Department: [facility] NEURO  Daily Treatment Note  NAME: Bethanie Turner  : 1965  MRN: [id number]    Date of Service: 10/19/2021    Discharge Recommendations:  Patient would benefit from continued therapy after discharge        Assessment   Assessment: Pt is SBA for bed mobility using hand rails for assist. Pt demos significant L side deficits and is limited by decreased strength. Pt performed transfer with Min-A using the vianey steady. Standing tolerance was around 10 min's before requiring a rest break. tactile cues were provided to decreased buckling of the L knee. Pt is limited by decreased strength and would benefit from intensive PT following discharge to address functional deficits to regain prior level of independence. Prognosis: Good  Decision Making: Medium Complexity  PT Education: Goals;PT Role;Plan of Care;Transfer Training;Family Education; Functional Mobility Training;Gait Training;Equipment  REQUIRES PT FOLLOW UP: Yes  Activity Tolerance  Activity Tolerance: Patient Tolerated treatment well     Patient Diagnosis(es): The encounter diagnosis was Cerebrovascular accident (CVA), unspecified mechanism ([address] has a past medical history of Hypertension and Tobacco abuse.   has no past surgical history on file. Restrictions  Restrictions/Precautions  Restrictions/Precautions: General Precautions, Fall Risk, Up as Tolerated  Required Braces or Orthoses?: No  Position Activity Restriction  Other position/activity restrictions: L side deficits, SBP <220  Subjective   General  Chart Reviewed: Yes  Response To Previous Treatment: Patient with no complaints from previous session. Family / Caregiver Present: No  Subjective  Subjective: RN and pt agreeable to PT. Pt supine in bed upon arrival, very pleasant and cooperative throughout. General Comment  Comments: Pt returned to the chair post session.   Pain Screening  Patient Currently in Pain: Denies  Vital Signs  Patient Currently in Pain: Denies       Orientation  Orientation  Overall Orientation Status: Within Functional Limits  Cognition   Cognition  Overall Cognitive Status: Exceptions  Following Commands: Follows multistep commands with increased time; Follows multistep commands with repitition  Attention Span: Attends with cues to redirect  Safety Judgement: Decreased awareness of need for assistance  Problem Solving: Assistance required to identify errors made;Assistance required to correct errors made  Initiation: Requires cues for some  Sequencing: Requires cues for some  Objective   Bed mobility  Supine to Sit: Stand by assistance  Scooting: Contact guard assistance  Comment: pt utilized bed rail for mobility. HOB elevated -20 degrees  Transfers  Sit to Stand: Minimal Assistance (Min-A with SS)  Ambulation  Ambulation?: No     Balance  Posture: Fair  Sitting - Static: Fair;+  Sitting - Dynamic: Fair  Standing - Static: Poor;+  Standing - Dynamic: Poor  Comments: standing balance assessed with vianey steady.   Exercises  Hip Flexion: x10 BLE  Knee Long Arc Quad: x10 BLE  Ankle Pumps: x15 RLE                                                AM-PAC Score  AM-PAC Inpatient Mobility Raw Score : 13 (10/19/21 1255)  AM-PAC Inpatient T-Scale Score : 36.74 (10/19/21 1255)  Mobility Inpatient CMS 0-100% Score: 64.91 (10/19/21 1255)  Mobility Inpatient CMS G-Code Modifier : CL (10/19/21 1255)          Goals  Short term goals  Time Frame for Short term goals: 14 visits  Short term goal 1: Perform  bed mobility Mod I  Short term goal 2: Perform sit to stand transfer with CGA  Short term goal 3: Ambulate 100ft with appropriate AD and Mod A  Short term goal 4: Demo Fair- dynamic standing balance with AD to decrease risk of falls    Plan    Plan  Times per week: 6-7x/wk  Current Treatment Recommendations: Strengthening, ROM, Balance Training, Functional Mobility Training, Transfer Training, Gait Training, Endurance Training, Home Exercise Program, Safety Education & Training, Patient/Caregiver Education & Training, Neuromuscular Re-education  Safety Devices  Type of devices: Nurse notified, Call light within reach, Gait belt, Left in bed  Restraints  Initially in place: No     Therapy Time   Individual Concurrent Group Co-treatment   Time In 1130         Time Out 1156         Minutes 26            Variance: [address], PTA

## 2021-10-19 NOTE — PLAN OF CARE
Problem: Skin Integrity:  Goal: Will show no infection signs and symptoms  Description: Will show no infection signs and symptoms  10/19/2021 1554 by Miguel Mckinney RN  Outcome: Completed  10/19/2021 0412 by Larena Schwab, RN  Outcome: Ongoing  Goal: Absence of new skin breakdown  Description: Absence of new skin breakdown  10/19/2021 1554 by Miguel Mckinney RN  Outcome: Completed  10/19/2021 0412 by Larena Schwab, RN  Outcome: Ongoing     Problem: Falls - Risk of:  Goal: Will remain free from falls  Description: Will remain free from falls  10/19/2021 1554 by Miguel Mckinney RN  Outcome: Completed  10/19/2021 0412 by Larena Schwab, RN  Outcome: Met This Shift  Goal: Absence of physical injury  Description: Absence of physical injury  10/19/2021 1554 by Miguel Mckinney RN  Outcome: Completed  10/19/2021 0412 by Larena Schwab, RN  Outcome: Met This Shift     Problem: HEMODYNAMIC STATUS  Goal: Patient has stable vital signs and fluid balance  10/19/2021 1554 by Miguel Mckinney RN  Outcome: Completed  10/19/2021 0412 by Larena Schwab, RN  Outcome: Ongoing     Problem: ACTIVITY INTOLERANCE/IMPAIRED MOBILITY  Goal: Mobility/activity is maintained at optimum level for patient  10/19/2021 1554 by Miguel Mckinney RN  Outcome: Completed  10/19/2021 0412 by Larena Schwab, RN  Outcome: Ongoing     Problem: Pain:  Goal: Pain level will decrease  Description: Pain level will decrease  10/19/2021 1554 by Miguel Mckinney RN  Outcome: Completed  10/19/2021 0412 by Larena Schwab, RN  Outcome: Ongoing  Goal: Control of acute pain  Description: Control of acute pain  10/19/2021 1554 by Miguel Mckinney RN  Outcome: Completed  10/19/2021 0412 by Larena Schwab, RN  Outcome: Ongoing  Goal: Control of chronic pain  Description: Control of chronic pain  10/19/2021 1554 by Miguel Mckinney RN  Outcome: Completed  10/19/2021 0412 by Larena Schwab, RN  Outcome: Ongoing

## 2021-10-19 NOTE — PROGRESS NOTES
Pt discharged to rehab, transported by a family members vehicle per pt request. Wheelchair used to transport patient to exit. IV, telemetry and SPO2 dc'd.

## 2021-10-19 NOTE — CARE COORDINATION
Arielle called from [place], she tells me she needs the copy of the cards as she did not receive them yesterday. Refaxed cards. Will await precert.

## 2021-10-19 NOTE — PROGRESS NOTES
Occupational Therapy  Facility/Department: [facility] NEURO  Daily Treatment Note  NAME: Ramirez Wilcox  : 1965  MRN: [id number]    Date of Service: 10/19/2021    Discharge Recommendations:  Patient would benefit from continued therapy after discharge  OT Equipment Recommendations  Walker: Platform Left  ADL Assistive Devices: Toileting - 3-in-1 Commode; Toilet Hygiene Aid;Transfer Tub Bench;Long-handled Shoe Horn;Long-handled Sponge    Pt able to IND complete AAROM on LUE utilizing RUE while sitting in chair. Pt demo ability to IND place LUE on arm rest of w/c   Assessment   Performance deficits / Impairments: Decreased functional mobility ; Decreased ADL status; Decreased ROM; Decreased strength;Decreased safe awareness;Decreased sensation;Decreased balance;Decreased high-level IADLs;Decreased fine motor control;Decreased coordination;Decreased posture  Treatment Diagnosis: CVA  Prognosis: Good  Patient Education: Pt ed on OT role, POC, safety awareness, selfPROM/AAROM, WB, transfer training, DME/AE use for home, LUE positioning, benefits of OOB activity and continued therapy  Barriers to Learning: Good return  REQUIRES OT FOLLOW UP: Yes  Activity Tolerance  Activity Tolerance: Patient Tolerated treatment well;Patient limited by fatigue  Activity Tolerance: Pt is very motivated to do therapy  Safety Devices  Safety Devices in place: Yes  Type of devices: Call light within reach; Patient at risk for falls; Left in chair;Nurse notified (Left in w/c)  Restraints  Initially in place: No         Patient Diagnosis(es): The encounter diagnosis was Cerebrovascular accident (CVA), unspecified mechanism ([facility] [address] has a past medical history of Hypertension and Tobacco abuse.   has no past surgical history on file.     Restrictions  Restrictions/Precautions  Restrictions/Precautions: General Precautions, Fall Risk, Up as Tolerated  Required Braces or Orthoses?: No  Position Activity Restriction  Other position/activity restrictions: L side deficits, SBP <220  Subjective   General  Chart Reviewed: Yes  Patient assessed for rehabilitation services?: Yes  Family / Caregiver Present: Yes (Daughter/Grand daughter)  General Comment  Comments: RN ok'd pt for OT tx this date. Pt agreeable to session and pleasant/cooperative throughout  Vital Signs  Patient Currently in Pain: Denies   Orientation  Orientation  Overall Orientation Status: Within Functional Limits  Objective    ADL  Feeding: Minimal assistance;Setup; Dentures; Increased time to complete (Pt Min A to open some containers, able to feed self. Eating lunch upon arrival)  Grooming: Stand by assistance;Setup;Verbal cueing; Increased time to complete (Pt washed face sitting in chair)  UE Dressing: Minimal assistance;Setup;Verbal cueing; Increased time to complete (Pt Donned/doffed jacket sitting in w/c, Min A to thread LUE into jacket. Pt verbalized understanding of dressing affected side first)  LE Dressing: Maximum assistance;Setup;Verbal cueing; Increased time to complete (Pt donned/doffed pants standing at chair. Pt Max A to thread LLE into pants, and for balance to pull up pants standing)  Toileting: Setup; Increased time to complete;Stand by assistance (Pt completed urination seated in chair, utilizing urinal to complete)        Balance  Sitting Balance: Stand by assistance  Standing Balance:  Moderate assistance  Standing Balance  Time: Approx 2 min x 2  Activity: Static/dynamic standing, dressing activity, stand pivot to w/c  Comment: Left knee buckling off/on during standing   Functional Mobility  Functional Mobility Comments: KRUPA d/t decreased standing balance  Wheelchair Bed Transfers  Wheelchair/Bed - Technique: Stand pivot  Level of Asssistance: Minimal assistance  Wheelchair Transfers Comments: Min A x2  Bed mobility  Supine to Sit: Unable to assess  Sit to Supine: Unable to assess  Scooting: Stand by assistance  Comment: Pt in chair upon arrival and departure  Transfers  Sit to stand: Minimal assistance;2 Person assistance  Stand to sit: Minimal assistance;2 Person assistance  Transfer Comments: Pt Mod-Max A x1, min A x2 for sit <> stand with RW     Cognition  Overall Cognitive Status: WFL  Following Commands: Follows multistep commands with increased time; Follows multistep commands with repitition  Attention Span: Attends with cues to redirect  Safety Judgement: Decreased awareness of need for assistance  Problem Solving: Assistance required to identify errors made;Assistance required to correct errors made  Initiation: Requires cues for some  Sequencing: Requires cues for some     Plan   Plan  Times per week: 4-5 x/wk  Current Treatment Recommendations: Strengthening, ROM, Balance Training, Functional Mobility Training, Neuromuscular Re-education, Safety Education & Training, Patient/Caregiver Education & Training, Equipment Evaluation, Education, & procurement, Positioning, Self-Care / ADL, Home Management Training, Cognitive/Perceptual Training  AM-PAC Score        AM-[facility] Inpatient Daily Activity Raw Score: 15 (10/19/21 1555)  AM-PAC Inpatient ADL T-Scale Score : 34.69 (10/19/21 1555)  ADL Inpatient CMS 0-100% Score: 56.46 (10/19/21 1555)  ADL Inpatient CMS G-Code Modifier : CK (10/19/21 1555)    Goals  Short term goals  Time Frame for Short term goals: By discharge, pt will:  Short term goal 1: Demo I with self PROM/AAROM and proper positioning of LUE to increase functional use for ADL participation  Short term goal 2: Demo CGA for UB ADLs and grooming tasks with setup and adaptive techs  Short term goal 3: Demo Mod A for LB ADLs and toileting tasks with setup, AE PRN, and adaptive techs  Short term goal 4: Demo I with bed mobility to increase independence with ADLs/IADLs and decrease risk for pressure injury  Short term goal 5: Demo Min A for functional transfers and functional mobility with use of LRD  Short term goal 6: Demo WB through LUE for 5+ minutes during functional task to promote neuromuscular reeducation       Therapy Time   Individual Concurrent Group Co-treatment   Time In 1406         Time Out 1506         Minutes 60         Timed Code Treatment Minutes: 61 Minutes   Pt in chair upon arrival, pleasant and motivated for therapy this date.  Pt retired to w/c at end of session, waiting for d/c paperwork     AJROCHO Rosario/MARTHA

## 2021-10-20 ENCOUNTER — CARE COORDINATION (OUTPATIENT)
Dept: CASE MANAGEMENT | Age: 56
End: 2021-10-20

## 2021-10-28 NOTE — DISCHARGE SUMMARY
[facility]     Department of Neurology    INPATIENT DISCHARGE SUMMARY        Patient Identification:  Tiffani Brown is a 60 y.o. male. :  1965  MRN: [id number]     Acct: [de-identified]   Admit Date:  10/14/2021  Discharge date and time: 10/19/2021  4:13 PM   Attending Provider: No att. providers found                                     Admission Diagnoses:   Stroke-like symptom [R29.90]  Cerebrovascular accident (CVA), unspecified mechanism ([facility] [address] [I63.9]    Discharge Diagnoses: Active Problems:    Stroke-like symptom    Cerebrovascular accident (CVA) ([facility] [address]  Resolved Problems:    * No resolved hospital problems. *       Consults:   none    Brief Inpatient course: The patient is a 59 y. o. male with past medical history of hypertension, chronic smoker, who presents with Left upper and lower extremity weakness and numbness.  Last known well 6 AM.  The patient is not on blood thinners.  Initial NIHSS of 2.  Initial systolic blood pressure was more than 200.  Blood sugar within normal meds.  The patient is working the third shift, he said that around 6 AM he started to feel abnormal, then followed by left-sided weakness.  He denied double vision, change in speech, facial droop, right-sided weakness, seizures or loss of consciousness, dizziness, headaches, nausea or vomiting, change in bowel or urinary habits.  Neuro exam revealed left upper extremity drift with no weakness, mild decrease in sensation in the left upper and lower extremity.  Will get stat CT head along with stat CT of the neck with contrast.  con't to improve, still can't move left arm  - con't aspirin and plavix for total 21 days and follow by aspirin 81mg daily lifelong  - pt is advised to stop smoking, as it is likely the biggest risk factor for stroke  - pt is a great candidate for acute rehab  - gradually aim for normalization of BP, keep BP below 140/90       Procedures:      Any Hospital Acquired Infections: none    Discharge Functional Status:  stable    Disposition: home    Patient Instructions:   Discharge Medication List as of 10/19/2021  3:46 PM      START taking these medications    Details   aspirin 81 MG EC tablet Take 1 tablet by mouth daily, Disp-30 tablet, R-3Normal      atorvastatin (LIPITOR) 80 MG tablet Take 1 tablet by mouth nightly, Disp-30 tablet, R-3Normal      clopidogrel (PLAVIX) 75 MG tablet Take 1 tablet by mouth daily for 17 doses, Disp-17 tablet, R-0Normal      metFORMIN (GLUCOPHAGE) 500 MG tablet Take 1 tablet by mouth 2 times daily (with meals), Disp-60 tablet, R-3Normal         CONTINUE these medications which have NOT CHANGED    Details   albuterol sulfate HFA (VENTOLIN HFA) 108 (90 Base) MCG/ACT inhaler Inhale 2 puffs into the lungs every 6 hours as needed for Wheezing, Disp-1 Inhaler, R-0Normal      lisinopril (PRINIVIL;ZESTRIL) 10 MG tablet Take 1 tablet by mouth daily, Disp-30 tablet, R-1Normal      Chlorpheniramine-PSE-Ibuprofen (ADVIL ALLERGY SINUS PO) Take  by mouth.      loratadine (CLARITIN) 10 MG tablet Take 10 mg by mouth daily. STOP taking these medications       ibuprofen (IBU) 800 MG tablet Comments:   Reason for Stopping:               Activity: activity as tolerated    Diet: regular diet    Follow-up:    [facility]  [address] [address]  [phone]  Schedule an appointment as soon as possible for a visit in 4 weeks        Follow up labs:      Follow up imaging:    Note that over 30 minutes was spent in preparing discharge papers, discussing discharge with patient, medication review, etc.      Adore Ross MD,  PGY 2 Neurology Resident  Department of Neurology  [address]  10/28/2021, 11:03 AM

## 2021-10-29 NOTE — ADT AUTH CERT
neurology by Salvador Muller RN       Review Status Review Entered   In Primary 10/29/2021 10:32      Criteria Review   10/19/21        Neruology      vitally stable, alert and oriented x 4.   No significant events over night  Patient blood pressure is running on the higher side-started 10 mg of Lisinopril  Echo done Yesterday with  Negative bubble studies  Patient started on Metformin 500 mg BID for Type 2 DM  Still waiting on the precert     BP (!) 408/951   Pulse 74   Temp 97.5 °F (36.4 °C) (Oral)   Resp 22   Ht 5' 9.5\" (1.765 m)   Wt 209 lb 14.1 oz (95.2 kg)   SpO2 94%   BMI 30.55 kg/m²         GENERAL  Appears comfortable and in no distress   HEENT  NC/ AT   HEART  S1 and S2 heard; palpation of pulses: radial pulse    NECK  Supple and no bruits heard   MENTAL STATUS:  Alert, oriented, intact memory, no confusion, normal speech, normal language, no hallucination or delusion   CRANIAL NERVES: II     -      Visual fields intact to confrontation  III,IV,VI -  PERR, EOMs full, no ptosis  V     -     Normal facial sensation   VII    -     Normal facial symmetry  VIII   -     Intact hearing   IX,X -     Symmetrical palate  XI    -     Symmetrical shoulder shrug  XII   -     Midline tongue, no atrophy    MOTOR FUNCTION: RUE: Significant for good strength of grade 5/5 in proximal and distal muscle groups   LUE: Significant for good strength of grade 3/5 in proximal and distal muscle groups   RLE: Significant for good strength of grade 5/5 in proximal and distal muscle groups   LLE: Significant for good strength of grade 5/5 in proximal and 3/5 in distal muscle groups       Normal bulk, normal tone and no involuntary movements, no tremor   SENSORY FUNCTION:  Normal touch, normal pinprick, normal vibration, normal proprioception   CEREBELLAR FUNCTION:  Intact fine motor control over upper limbs and lower limbs   REFLEX FUNCTION:  Symmetric in upper and lower extremities, no Babinski sign   STATION and GAIT  Normal gait and tandem station, normal tip toes and heel walking         Patient is a 59 y. o. male presented with left upper and lower extremity weakness, foot drop.  Patient presented as a stroke alert, NIH 2 at presentation.  Not a candidate for TPA, CT head unremarkable, CTA head and neck showed focal calcification of the origin of the left vertebral artery.  MRI brain shows acute/subacute ischemia in the right basal ganglia region and echocardiogram pending.  Stroke work-up ongoing.  Symptoms slightly worsened.  Loaded with aspirin and Plavix. 1. Acute/subacute right basal ganglia stroke  2. Hypertension  3. Type  2 DM - HBA1C 7.7  Plan:      · MRI brain without contrast: Acute/subacute right basal ganglia stroke. · Echocardiogram with bubble studies is negative  · Continue aspirin 81 mg and Plavix 75 mg daily, loaded with aspirin and Plavix  · Continue Lipitor 80 mg nightly  · Target goal of blood pressure is < 140/90   · LDL 97, HbA1c 7.7  · PMNR consulted: Recommending acute rehab  · We will complete hypercoagulable work-up as outpatient  · Echo with bubble studies- follow up on the report     - R BG Acute stroke  - con't DAPT for total 21 days  - lipitor 80mg daily with goal LDL less than 70  - pt can go to acute rehab, he will benefit from it        Physical Therapy  Discharge Recommendations:  Patient would benefit from continued therapy after discharge      Assessment   Assessment: Pt is SBA for bed mobility using hand rails for assist. Pt demos significant L side deficits and is limited by decreased strength. Pt performed transfer with Min-A using the vianey steady. Standing tolerance was around 10 min's before requiring a rest break. tactile cues were provided to decreased buckling of the L knee. Pt is limited by decreased strength and would benefit from intensive PT following discharge to address functional deficits to regain prior level of independence.   Prognosis: Good  Decision Making: Medium Complexity        Occupational Therapy  Discharge Recommendations:  Patient would benefit from continued therapy after discharge  OT Equipment Recommendations  Walker: Platform Left  ADL Assistive Devices: Toileting - 3-in-1 Commode; Toilet Hygiene Aid;Transfer Tub Bench;Long-handled Shoe Horn;Long-handled Sponge     Pt able to IND complete AAROM on LUE utilizing RUE while sitting in chair. Pt demo ability to IND place LUE on arm rest of w/c   Assessment   Performance deficits / Impairments: Decreased functional mobility ; Decreased ADL status; Decreased ROM; Decreased strength;Decreased safe awareness;Decreased sensation;Decreased balance;Decreased high-level IADLs;Decreased fine motor control;Decreased coordination;Decreased posture  Treatment Diagnosis: CVA  Prognosis: Good     Glucose 160      Dc to inpt rehab  Esvin,                     neurology by Kenia Fatima RN       Review Status Review Entered   In Primary 10/29/2021 10:27      Criteria Review   10/18/21        Neurology     The patient was seen and examined at bedside.  Is vitally stable, alert and oriented x 4.   No significant events over night  Blood pressure is running on the higher side   BMP within normal limits  Platelets are normal  Patient is still waiting to get the Echo- Going now     BP (!) 142/82   Pulse 67   Temp 97.7 °F (36.5 °C) (Oral)   Resp 13   Ht 5' 9.5\" (1.765 m)   Wt 209 lb 14.1 oz (95.2 kg)   SpO2 93%   BMI 30.55 kg/m²         GENERAL  Appears comfortable and in no distress   HEENT  NC/ AT   HEART  S1 and S2 heard; palpation of pulses: radial pulse    NECK  Supple and no bruits heard   MENTAL STATUS:  Alert, oriented, intact memory, no confusion, normal speech, normal language, no hallucination or delusion   CRANIAL NERVES: II     -      Visual fields intact to confrontation  III,IV,VI -  PERR, EOMs full, no ptosis  V     -     Normal facial sensation   VII    -     Normal facial symmetry  VIII   -     Intact hearing Recommendations  Equipment Needed: No     Assessment   Assessment: Pt completed therapuetic exercise in supine in bed. Refused transfer training this date. AROM was performed with the RLE and AAROM was compeleted on the LLE. Pt would benefit from intensive PT following d/c to address functional deficits to regain prior level of independence. Prognosis: Good  Decision Making: Medium Complexity        Occupational Therapy  Discharge Recommendations: Further therapy recommended at discharge. The patient should be able to tolerate at least three hours of therapy per day over 5 days or 15 hours over 7 days.   Patient would benefit from continued therapy after discharge     Assessment   Performance deficits / Impairments: Decreased functional mobility ; Decreased ADL status; Decreased ROM; Decreased strength;Decreased safe awareness;Decreased cognition;Decreased sensation;Decreased balance;Decreased high-level IADLs;Decreased fine motor control;Decreased coordination;Decreased posture  Assessment: Pt will benefit from continued intensive OT services to address deficits listed in order for pt to progress toward independence with ADLs/IADLs and functional transfers/mobility. Treatment Diagnosis: CVA  Prognosis: Good        Glucose 143     Echo   Summary  Left ventricle is normal in size, global left ventricular systolic function  is low normal.  Calculated ejection fraction is 51%. Left atrium is normal in size. Inter-atrial septum is intact with no evidence for an atrial septal defect. Negative bubble study, no shunt noted. Right atrium is normal in size. Normal right ventricular size and function.   No significant valvular abnormalities.        Asa 81 mg daily, lipitor 80 mg daiy, plavix 75 mg dialy, lovenox 40 mg sc daily   Humalog ss,x1    tylenol 650 mg q4hprn x1 ,  glucophage 500 mg 2xd,  albuterol inh prn q6h x1,      Dc plan   inpt rehab                                  neurology by Javid Vela RN       Review Status Review Entered   In Primary 10/29/2021 10:23      Criteria Review   10/17/21      Neurology   The patient was seen and examined at bedside. Is vitally stable, alert and oriented x 4.   No significant events over night  Vitals are within normal limits  Sodium is 133  Blood glucose is 146  Platelets are 72 today dropped from 206 Yesterday     BP (!) 144/97   Pulse 62   Temp 97.5 °F (36.4 °C) (Oral)   Resp 16   Ht 5' 9.5\" (1.765 m)   Wt 209 lb 14.1 oz (95.2 kg)   SpO2 97%   BMI 30.55 kg/m²      GENERAL  Appears comfortable and in no distress   HEENT  NC/ AT   HEART  S1 and S2 heard; palpation of pulses: radial pulse    NECK  Supple and no bruits heard   MENTAL STATUS:  Alert, oriented, intact memory, no confusion, normal speech, normal language, no hallucination or delusion   CRANIAL NERVES: II     -      Visual fields intact to confrontation  III,IV,VI -  PERR, EOMs full, no ptosis  V     -     Normal facial sensation   VII    -     Normal facial symmetry  VIII   -     Intact hearing   IX,X -     Symmetrical palate  XI    -     Symmetrical shoulder shrug  XII   -     Midline tongue, no atrophy    MOTOR FUNCTION: RUE: Significant for good strength of grade 5/5 in proximal and distal muscle groups   LUE: Significant for good strength of grade 3/5 in proximal and distal muscle groups   RLE: Significant for good strength of grade 5/5 in proximal and distal muscle groups   LLE: Significant for good strength of grade 5/5 in proximal and 3/5 in distal muscle groups       Normal bulk, normal tone and no involuntary movements, no tremor   SENSORY FUNCTION:  Normal touch, normal pinprick, normal vibration, normal proprioception   CEREBELLAR FUNCTION:  Intact fine motor control over upper limbs and lower limbs   REFLEX FUNCTION:  Symmetric in upper and lower extremities, no Babinski sign   STATION and GAIT  Normal gait and tandem station, normal tip toes and heel walking          55 y. o. male presented with left upper and lower extremity weakness, foot drop.  Patient presented as a stroke alert, NIH 2 at presentation.  Not a candidate for TPA, CT head unremarkable, CTA head and neck showed focal calcification of the origin of the left vertebral artery.  MRI brain shows acute/subacute ischemia in the right basal ganglia region and echocardiogram pending.  Stroke work-up ongoing.  Symptoms slightly worsened.  Loaded with aspirin and Plavix. 1. Acute/subacute right basal ganglia stroke  2. Hypertension  3. Thromocytopenia     Plan:      · MRI brain without contrast: Acute/subacute right basal ganglia stroke. · Echocardiogram pending  · Continue aspirin 81 mg and Plavix 75 mg daily, loaded with aspirin and Plavix  · Continue Lipitor 80 mg nightly  · LDL 97, HbA1c 7.7  · Repeat platelets counts and if they are still low then we will check for HIT  · PT/OT/ST  · PMNR consulted: Recommending acute rehab  · SBP goal less than 220   · We will complete hypercoagulable work-up as outpatient  · We will consider LATA and loop recorder placement after reviewing 2D echo.     Na 133  Glucose 146     Normal sinus rhythm  Normal ECG  When compared with ECG of 14-OCT-2021 17:04,     Asa 81 mg daily, lipitor 80 mg daiy, plavix 75 mg dialy, lovenox 40 mg sc daily   Humalog ss,  tylenol 650 mg q4hprn x2,       Dc plan   inpt rehab                            neurology by Ashtyn French RN       Review Status Review Entered   In Primary 10/29/2021 10:17      Criteria Review   10/16/21     Neurology   The patient was seen and examined at bedside. Is vitally stable, alert and oriented x 4. No acute events overnight.   The patient states he slept well overnight, denies any complaints at this time. Stated that he continues to feel weak on his left side, symptoms appear to be stable, no significant improvement.  Patient counseled regarding diagnosis of acute right basal ganglia stroke, imaging discussed with patient and his son. Shadi Banks counseled in detail regarding the need for therapy, follow-up and patient expressed understanding and agreement with treatment plan.     MRI brain and echocardiogram pending.  PMNR recommending acute rehab,        BP (!) 154/87   Pulse 85   Temp 98 °F (36.7 °C) (Oral)   Resp 21   Ht 5' 9.5\" (1.765 m)   Wt 209 lb 14.1 oz (95.2 kg)   SpO2 97%   BMI 30.55 kg/m²      Neurologic Exam      GENERAL  Appears comfortable and in no distress   HEENT  NC/ AT   HEART  S1 and S2 heard; palpation of pulses: radial pulse    NECK  Supple and no bruits heard   MENTAL STATUS:  Alert, oriented, intact memory, no confusion, normal speech, normal language, no hallucination or delusion   CRANIAL NERVES: II     -      Visual fields intact to confrontation  III,IV,VI -  PERR, EOMs full, no ptosis  V     -     Normal facial sensation   VII    -     Normal facial symmetry  VIII   -     Intact hearing   IX,X -     Symmetrical palate  XI    -     Symmetrical shoulder shrug  XII   -     Midline tongue, no atrophy    MOTOR FUNCTION: RUE: Significant for good strength of grade 5/5 in proximal and distal muscle groups   LUE: Significant for good strength of grade 3/5 in proximal and distal muscle groups   RLE: Significant for good strength of grade 5/5 in proximal and distal muscle groups   LLE: Significant for good strength of grade 5/5 in proximal and 1/5 in distal muscle groups       Normal bulk, normal tone and no involuntary movements, no tremor   SENSORY FUNCTION:  Normal touch, normal pinprick, normal vibration, normal proprioception   CEREBELLAR FUNCTION:  Intact fine motor control over upper limbs and lower limbs   REFLEX FUNCTION:  Symmetric in upper and lower extremities, no Babinski sign   STATION and GAIT  Normal gait and tandem station, normal tip toes and heel walking      Patient is a 59 y. o. male presented with left upper and lower extremity weakness, foot drop.  Patient presented as a stroke alert, NIH 2 at presentation.  Not a candidate for TPA, CT head unremarkable, CTA head and neck showed focal calcification of the origin of the left vertebral artery.  MRI brain and echocardiogram pending.  Stroke work-up ongoing.  Symptoms slightly worsened.  Loaded with aspirin and Plavix.  MRI brain positive for acute/subacute right basal ganglia stroke.     1. Acute/subacute right basal ganglia stroke  2. Hypertension     Plan:      · MRI brain without contrast: Acute/subacute right basal ganglia stroke. · Echocardiogram pending  · Continue aspirin 81 mg and Plavix 75 mg daily, loaded with aspirin and Plavix  · Continue Lipitor 80 mg nightly  · LDL 97, HbA1c 7.7  · PT/OT/ST  · PMNR consulted: Recommending acute rehab  · SBP goal less than 220   · We will complete hypercoagulable work-up as outpatient  · We will consider LATA and loop recorder placement after reviewing 2D echo  · Lovenox for DVT prophylaxis     NIH of 2 on presentation. CT head did not show any acute changes. CTA head and neck showed focal calcification of origin of left vertebral artery otherwise no significant stenosis or occlusion noted. MRI brain showed acute/subacute infarct in the right basal ganglia.     Hypertension  Chronic smoker     LDL 97  HbA1c 7.7        Plan     · Patient was loaded with aspirin and Plavix on presentation, continue aspirin 81 mg and Plavix 75 mg for 21 days followed by aspirin monotherapy  · Continue Lipitor 80 mg, goal LDL less than 70  · 2D echo with bubble study pending  · Hypercoagulable panel in  6 to 8 weeks of the event.   · PT/OT/ST  · PM&R consulted-recommend acute rehab  · Full code  · Lovenox for DVT prophylaxis  ·  2D echo     Glucose 138     Asa 81 mg daily, lipitor 80 mg daiy, plavix 75 mg dialy, lovenox 40 mg sc daily   Humalog ss, ivf 75 ml hr , tylenol 650 mg q4hprn x2,       Dc plan   inpt rehab       Neurology [facility] - Care Day 2 (10/15/2021) by Nighat Watkins RN       Review Status Review Entered   Completed 10/15/2021 12:55    Criteria Review      Care Day: 2 Care Date: 10/15/2021 Level of Care:    Guideline Day 2    Clinical Status    ( ) * No ICU or intermediate care needs    Interventions    (X) Inpatient interventions continue    10/15/2021 12:55 PM EDT by Salo Ptots      asa ec 81 mg po qd  lipitor 80 mg po hs  plavix 75 mg po qd  lovenox 40 mg sc qd  NS 75 ml/hr iv    * Milestone   Additional Notes   10/15/2021        Neurology       Patient presents with   · Cerebrovascular Accident           Interval hx:       The patient was seen and examined at bedside. Is vitally stable, alert and oriented x 4. No acute events overnight. The patient states he slept well overnight.  Stated that he continues to feel weak on his left side, states his symptoms have not improved since arrival, possibly slightly worsened.  Son present at bedside, states he also feels that the patient's weakness has somewhat worsened.       MRI brain and echocardiogram pending. Review of Systems:       Review of Systems    Constitutional: Negative for activity change, appetite change, chills, diaphoresis and fever. Respiratory: Negative for apnea, cough, chest tightness, shortness of breath and wheezing.     Cardiovascular: Negative for chest pain and palpitations. Gastrointestinal: Negative for abdominal distention, abdominal pain, constipation, diarrhea, nausea and vomiting. Genitourinary: Negative for difficulty urinating, dysuria and frequency. Musculoskeletal: Negative for arthralgias and myalgias. Neurological: Positive for weakness (Left-sided). Negative for dizziness, light-headedness and headaches.     Psychiatric/Behavioral: Negative for agitation and confusion.    All other systems reviewed and are negative.           Physical Exam:   BP (!) 150/96   Pulse 87   Temp 98.3 °F (36.8 °C) (Oral)   Resp 17   Ht 5' 9.5\" (1.765 m)   Wt 190 lb (86.2 kg)   SpO2 95%   BMI 27.66 kg/m²    Temp (24hrs), Av.4 °F (36.9 °C), Min:98.1 °F (36.7 °C), Max:99 °F (37.2 °C)          Active Hospital Problems     Diagnosis Date Noted   · Stroke-like symptom [R29.90] 10/14/2021           Patient is K 19 y. o. male presented with left upper and lower extremity weakness, foot drop.  Patient presented as a stroke alert, NIH 2 at presentation.  Not a candidate for TPA, CT head unremarkable, CTA head and neck showed focal calcification of the origin of the left vertebral artery.  MRI brain and echocardiogram pending.  Stroke work-up ongoing.  Symptoms slightly worsened.  Loaded with aspirin and Plavix.       1.  TIA/stroke       Plan:       MRI brain without contrast pending   Echocardiogram pending   Continue aspirin 81 mg and Plavix 75 mg daily, loaded with aspirin and Plavix   Continue Lipitor 80 mg nightly   LDL 97, HbA1c 7.7   PT/OT/ST   PMNR consulted for acute rehab placement   SBP goal less than 220    Discharge planning ongoing    Lovenox for DVT prophylaxis      10/15/2021 05:12   Sodium: 131 (L)   CO2: 17 (L)   Glucose: 186 (H)   Chol/HDL Ratio: 5.4 (H)   HDL Cholesterol: 28 (L)   Hemoglobin A1C: 7.7 (H)   POC Glucose: 187 (H

## 2022-01-10 ENCOUNTER — VIRTUAL VISIT (OUTPATIENT)
Dept: NEUROLOGY | Age: 57
End: 2022-01-10
Payer: COMMERCIAL

## 2022-01-10 DIAGNOSIS — I69.30 LATE EFFECTS OF CEREBRAL ISCHEMIC STROKE: Primary | ICD-10-CM

## 2022-01-10 PROCEDURE — 99212 OFFICE O/P EST SF 10 MIN: CPT | Performed by: STUDENT IN AN ORGANIZED HEALTH CARE EDUCATION/TRAINING PROGRAM

## 2022-01-10 RX ORDER — LISINOPRIL 5 MG/1
TABLET ORAL
COMMUNITY
Start: 2022-01-03

## 2022-01-10 RX ORDER — ATORVASTATIN CALCIUM 80 MG/1
80 TABLET, FILM COATED ORAL NIGHTLY
Qty: 30 TABLET | Refills: 3 | Status: SHIPPED | OUTPATIENT
Start: 2022-01-10 | End: 2022-08-09

## 2022-01-10 RX ORDER — ASPIRIN 81 MG/1
81 TABLET ORAL DAILY
Qty: 30 TABLET | Refills: 3 | Status: SHIPPED | OUTPATIENT
Start: 2022-01-10

## 2022-01-10 NOTE — PROGRESS NOTES
[facility] Neurology Telehealth New Visit    Pt Name: Naida Becker  MRN: [id number]  YOB: 1965  Date of evaluation: 1/10/2022  Primary Care Physician: No primary care provider on file. Reason for Evaluation: TELEHEALTH EVALUATION -- Audio/Visual (During FPFDJ-02 public health emergency)     HPI 10/14/2021  The patient is a 60 y.o. male with past medical history of hypertension, chronic smoker, who presents with Left upper and lower extremity weakness and numbness. Last known well 6 AM.  The patient is not on blood thinners. Initial NIHSS of 2. Initial systolic blood pressure was more than 200. Blood sugar within normal meds. The patient is working the third shift, he said that around 6 AM he started to feel abnormal, then followed by left-sided weakness. He denied double vision, change in speech, facial droop, right-sided weakness, seizures or loss of consciousness, dizziness, headaches, nausea or vomiting, change in bowel or urinary habits. Neuro exam revealed left upper extremity drift with no weakness, mild decrease in sensation in the left upper and lower extremity.     - CTH WO neg for acute changes   - CTA H/N neg for critical stenosis or LVO              - MRI Brain WO with subacute stroke in the R BG              - ECHO 51% with neg bubble study.              - Loaded with ASA and Plavix, discharged on ASA and Plavix for 3 weeks and then ASA 81 mg as monotherapy. - On Lipiotr 80 mg , LDL 97, HbA1c 7.7    Interval hx  The pt was seen and examined thru teleapp. He is alert and oriented x4. The patient stated that he is improving in the left side with physical therapy. He is on aspirin 81 mg daily along with Lipitor 80 mg. He was started on lisinopril 5 mg along with metformin 500 mg twice daily. The patient stated that his blood pressure is running between 120s and 130s over 80 and 90.   To follow-up with PCP in a month and with neurology clinic in 4 to 6 months. Allergies   Allergen Reactions    Penicillins Rash     Current Outpatient Medications   Medication Sig Dispense Refill    lisinopril (PRINIVIL;ZESTRIL) 5 MG tablet take 1 tablet by mouth once daily      aspirin 81 MG EC tablet Take 1 tablet by mouth daily 30 tablet 3    atorvastatin (LIPITOR) 80 MG tablet Take 1 tablet by mouth nightly 30 tablet 3    metFORMIN (GLUCOPHAGE) 500 MG tablet Take 1 tablet by mouth 2 times daily (with meals) 60 tablet 3    albuterol sulfate HFA (VENTOLIN HFA) 108 (90 Base) MCG/ACT inhaler Inhale 2 puffs into the lungs every 6 hours as needed for Wheezing 1 Inhaler 0    clopidogrel (PLAVIX) 75 MG tablet Take 1 tablet by mouth daily for 17 doses 17 tablet 0    Chlorpheniramine-PSE-Ibuprofen (ADVIL ALLERGY SINUS PO) Take  by mouth. (Patient not taking: Reported on 1/10/2022)      loratadine (CLARITIN) 10 MG tablet Take 10 mg by mouth daily. (Patient not taking: Reported on 1/10/2022)       No current facility-administered medications for this visit. Past Medical History:   Diagnosis Date    Hypertension     Tobacco abuse       No past surgical history on file. No family history on file.   Social History     Tobacco Use    Smoking status: Current Every Day Smoker     Packs/day: 1.00     Years: 30.00     Pack years: 30.00     Types: Cigarettes    Smokeless tobacco: Never Used   Substance Use Topics    Alcohol use: Yes     Comment: moderately          Subjective:     Review of Systems      Objective:   Physical Exam  General Appearance:  Alert, cooperative, no signs of distress, appears stated age   Head:  Normocephalic, no signs of trauma   Eyes:  Conjunctiva/corneas clear;  eyelids intact   Ears:  Normal external ear and canals   Nose: Nares normal, no drainage    Throat: Lips and tongue normal; teeth normal;  gums normal   Extremities: Extremities normal, no cyanosis, no edema   Skin: Skin color, texture normal, no rashes, no lesions NEUROLOGIC EXAMINATION      Mental status    Alert and oriented x 3; able to follow commands, speech and language intact; no hallucinations or delusions  Fund of information appropriate for level of education    Cranial nerves    II - grossly intact  III, IV, VI - extra-ocular muscles full: no nystagmus, no ptosis   V - normal facial sensation                                                               VII -left facial droop                                                            VIII - intact hearing                                                                             IX, X - symmetrical palate                                                                  XI - symmetrical shoulder shrug                                                       XII - tongue midline without atrophy      Motor function   the patient was able to antigravity left upper and lower extremity with drift. Sensory function Unable to test      Cerebellar Intact fine motor movement. No involuntary movements or tremors. No ataxia or dysmetria on finger to nose      Reflex function Unable to test      Gait                    the patient was using a walker with min being on the left side. Assessment:       The patient is a 60 y.o. male with past medical history of hypertension, chronic smoker, who presents with Left upper and lower extremity weakness and numbness. MRI of the brain with right basal ganglia stroke. CTA is unremarkable except for calcification of the origin of the left vertebral artery. On aspirin 81 mg and Lipitor. Hypertensive and diabetic well controlled. Recommendations:      -Continue aspirin 81 mg daily along with Lipitor 80 mg.  -Continue lisinopril 5 mg along with metformin 500 mg twice daily for history of high blood pressure and diabetes.   -Continue physical therapy.  -The patient denied any family history of hypercoagulability, given the risk factors that he had of hypertension with systolic blood pressure more than 230, diabetes and chronic smoker, will hold off on hypercoagulopathy panel for now.  -To follow-up with PCP in a month with neurology clinic in 4 months. Consultation Visit Time:  50 minutes   Discussed use, benefit, and side effects of prescribed medications. Personally reviewed imaging with patients and all questions answered. Pt voiced understanding. Patient agreed with treatment plan. Follow up as directed above. If you have any questions, please do not hesitate to call me. I look forward to following Naida Becker      Sincerely,    Rick Leon MD  Electronically signed by Rick Leon MD, MD on 1/10/2022 at 2:36 PM     This is a telehealth visit that was performed with the originating site at Patient Location: Patient Home and Provider Location of [place], [place]. Patient ID verified by me prior to start of this visit. Verbal consent to participate in video visit was obtained. Pursuant to the emergency declaration under the [facility], [facility] waiver authority and the [employer] Act, this Virtual Visit was conducted, with patient's consent, to reduce the patient's risk of exposure to COVID-19 and provide continuity of care for an established/new patient. Services were provided through a video synchronous discussion virtually to substitute for in-person clinic visit. I discussed with the patient the nature of our telehealth visits via interactive/real-time audio/video that:  - I would evaluate the patient and recommend diagnostics and treatments based on my assessment  - Our sessions are not being recorded and that personal health information is protected  - Our team would provide follow up care in person if/when the patient needs it.

## 2022-01-15 ENCOUNTER — APPOINTMENT (OUTPATIENT)
Dept: CT IMAGING | Age: 57
DRG: 041 | End: 2022-01-15
Payer: COMMERCIAL

## 2022-01-15 ENCOUNTER — HOSPITAL ENCOUNTER (INPATIENT)
Age: 57
LOS: 1 days | Discharge: HOME | DRG: 041 | End: 2022-01-17
Attending: EMERGENCY MEDICINE | Admitting: PSYCHIATRY & NEUROLOGY
Payer: COMMERCIAL

## 2022-01-15 DIAGNOSIS — R53.1 LEFT-SIDED WEAKNESS: Primary | ICD-10-CM

## 2022-01-15 DIAGNOSIS — I65.21 STENOSIS OF RIGHT INTERNAL CAROTID ARTERY: ICD-10-CM

## 2022-01-15 DIAGNOSIS — R29.90 STROKE-LIKE EPISODE: ICD-10-CM

## 2022-01-15 DIAGNOSIS — Z86.73 HISTORY OF CEREBRAL INFARCTION: ICD-10-CM

## 2022-01-15 DIAGNOSIS — I63.9 BRAINSTEM INFARCTION (HCC): ICD-10-CM

## 2022-01-15 LAB
% CKMB: 1.2 % (ref 0–3.5)
ABSOLUTE EOS #: 0.1 K/UL (ref 0–0.44)
ABSOLUTE IMMATURE GRANULOCYTE: <0.03 K/UL (ref 0–0.3)
ABSOLUTE LYMPH #: 3.27 K/UL (ref 1.1–3.7)
ABSOLUTE MONO #: 0.66 K/UL (ref 0.1–1.2)
ANION GAP SERPL CALCULATED.3IONS-SCNC: 19 MMOL/L (ref 9–17)
BASOPHILS # BLD: 1 % (ref 0–2)
BASOPHILS ABSOLUTE: 0.09 K/UL (ref 0–0.2)
BUN BLDV-MCNC: 17 MG/DL (ref 6–20)
BUN/CREAT BLD: ABNORMAL (ref 9–20)
CALCIUM SERPL-MCNC: 9.8 MG/DL (ref 8.6–10.4)
CHLORIDE BLD-SCNC: 99 MMOL/L (ref 98–107)
CK MB: 2.6 NG/ML
CKMB INTERPRETATION: ABNORMAL
CO2: 20 MMOL/L (ref 20–31)
CREAT SERPL-MCNC: 0.82 MG/DL (ref 0.7–1.2)
DIFFERENTIAL TYPE: ABNORMAL
EOSINOPHILS RELATIVE PERCENT: 1 % (ref 1–4)
GFR AFRICAN AMERICAN: >60 ML/MIN
GFR NON-AFRICAN AMERICAN: >60 ML/MIN
GFR SERPL CREATININE-BSD FRML MDRD: ABNORMAL ML/MIN/{1.73_M2}
GFR SERPL CREATININE-BSD FRML MDRD: ABNORMAL ML/MIN/{1.73_M2}
GLUCOSE BLD-MCNC: 132 MG/DL (ref 70–99)
HCT VFR BLD CALC: 48.9 % (ref 40.7–50.3)
HEMOGLOBIN: 16.5 G/DL (ref 13–17)
IMMATURE GRANULOCYTES: 0 %
INR BLD: 1
LYMPHOCYTES # BLD: 33 % (ref 24–43)
MCH RBC QN AUTO: 31.3 PG (ref 25.2–33.5)
MCHC RBC AUTO-ENTMCNC: 33.7 G/DL (ref 28.4–34.8)
MCV RBC AUTO: 92.6 FL (ref 82.6–102.9)
MONOCYTES # BLD: 7 % (ref 3–12)
MYOGLOBIN: 76 NG/ML (ref 28–72)
NRBC AUTOMATED: 0 PER 100 WBC
PARTIAL THROMBOPLASTIN TIME: 23.6 SEC (ref 20.5–30.5)
PDW BLD-RTO: 13.2 % (ref 11.8–14.4)
PLATELET # BLD: 242 K/UL (ref 138–453)
PLATELET ESTIMATE: ABNORMAL
PMV BLD AUTO: 9.5 FL (ref 8.1–13.5)
POTASSIUM SERPL-SCNC: 4 MMOL/L (ref 3.7–5.3)
PROTHROMBIN TIME: 10.6 SEC (ref 9.1–12.3)
RBC # BLD: 5.28 M/UL (ref 4.21–5.77)
RBC # BLD: ABNORMAL 10*6/UL
SEG NEUTROPHILS: 58 % (ref 36–65)
SEGMENTED NEUTROPHILS ABSOLUTE COUNT: 5.92 K/UL (ref 1.5–8.1)
SODIUM BLD-SCNC: 138 MMOL/L (ref 135–144)
TOTAL CK: 221 U/L (ref 39–308)
TROPONIN INTERP: ABNORMAL
TROPONIN T: ABNORMAL NG/ML
TROPONIN, HIGH SENSITIVITY: 9 NG/L (ref 0–22)
WBC # BLD: 10.1 K/UL (ref 3.5–11.3)
WBC # BLD: ABNORMAL 10*3/UL

## 2022-01-15 PROCEDURE — 80048 BASIC METABOLIC PNL TOTAL CA: CPT

## 2022-01-15 PROCEDURE — 82550 ASSAY OF CK (CPK): CPT

## 2022-01-15 PROCEDURE — 93005 ELECTROCARDIOGRAM TRACING: CPT | Performed by: STUDENT IN AN ORGANIZED HEALTH CARE EDUCATION/TRAINING PROGRAM

## 2022-01-15 PROCEDURE — 82553 CREATINE MB FRACTION: CPT

## 2022-01-15 PROCEDURE — 85730 THROMBOPLASTIN TIME PARTIAL: CPT

## 2022-01-15 PROCEDURE — 6360000004 HC RX CONTRAST MEDICATION: Performed by: EMERGENCY MEDICINE

## 2022-01-15 PROCEDURE — 84484 ASSAY OF TROPONIN QUANT: CPT

## 2022-01-15 PROCEDURE — 99255 IP/OBS CONSLTJ NEW/EST HI 80: CPT | Performed by: PSYCHIATRY & NEUROLOGY

## 2022-01-15 PROCEDURE — 83874 ASSAY OF MYOGLOBIN: CPT

## 2022-01-15 PROCEDURE — 85610 PROTHROMBIN TIME: CPT

## 2022-01-15 PROCEDURE — 70498 CT ANGIOGRAPHY NECK: CPT

## 2022-01-15 PROCEDURE — 70450 CT HEAD/BRAIN W/O DYE: CPT

## 2022-01-15 PROCEDURE — 85025 COMPLETE CBC W/AUTO DIFF WBC: CPT

## 2022-01-15 PROCEDURE — 99284 EMERGENCY DEPT VISIT MOD MDM: CPT

## 2022-01-15 RX ADMIN — IOPAMIDOL 90 ML: 755 INJECTION, SOLUTION INTRAVENOUS at 22:40

## 2022-01-15 ASSESSMENT — ENCOUNTER SYMPTOMS
CONSTIPATION: 0
COUGH: 0
DIARRHEA: 0
ABDOMINAL PAIN: 0
VOMITING: 0
NAUSEA: 0
BACK PAIN: 0
RHINORRHEA: 0
CHEST TIGHTNESS: 0
SHORTNESS OF BREATH: 0
SORE THROAT: 0

## 2022-01-15 NOTE — ED PROVIDER NOTES
[facility] ED  Emergency Department Encounter  Emergency Medicine Resident     Pt Name: Juve Siddiqui  MRN: [id number]  [place] 1965  Date of evaluation: 1/15/22  PCP:  No primary care provider on file. CHIEF COMPLAINT       Chief Complaint   Patient presents with    Cerebrovascular Accident     Increased weakness to LUE compared to prior movement from CVA on October 13       HISTORY OFPRESENT ILLNESS  (Location/Symptom, Timing/Onset, Context/Setting, Quality, Duration, Modifying Factors,Severity.)      Juve Siddiqui is a 60 y.o. male with past medical history of right basal ganglia stroke 10/14/2021 with residual left-sided weakness and numbness who presents with worsening left upper extremity weakness and slurred speech over the past 4 days. Patient reports that he is undergoing physical therapy for his left-sided weakness related to his prior stroke. He states that he has been working very hard in physical therapy and initially attributed his increased weakness to this. However, his family also states that they noticed slurred speech and encouraged him daily to come to the emergency department until he finally did 4 days later. Patient denies headache, lightheadedness, dizziness, chest pain, shortness of breath, numbness. He is mostly concerned because he is unable to lift his left arm as high as he usually is able to. He denies any trauma or injury. He has been taking his medications as prescribed. He takes aspirin 81 mg but no other blood thinners. Patient did not receive tPA or thrombectomy from his prior stroke. He was loaded with aspirin and Plavix and managed medically. Patient did require a Cardene drip at that time. Blood pressure is currently 159/104. A stroke alert was paged on arrival.    PAST MEDICAL / SURGICAL / SOCIAL / FAMILY HISTORY      has a past medical history of Hypertension and Tobacco abuse.     Denies past surgical history    Social:  reports that he has been smoking cigarettes. He has a 30.00 pack-year smoking history. He has never used smokeless tobacco. He reports current alcohol use. He reports current drug use. Drug: Marijuana [place]). Family Hx: History reviewed. No pertinent family history. Allergies:  Penicillins    Home Medications:  Prior to Admission medications    Medication Sig Start Date End Date Taking? Authorizing Provider   lisinopril (PRINIVIL;ZESTRIL) 5 MG tablet take 1 tablet by mouth once daily 1/3/22   Historical Provider, MD   aspirin 81 MG EC tablet Take 1 tablet by mouth daily 1/10/22   Zackary Carmona MD   atorvastatin (LIPITOR) 80 MG tablet Take 1 tablet by mouth nightly 1/10/22   Zackary Carmona MD   metFORMIN (GLUCOPHAGE) 500 MG tablet Take 1 tablet by mouth 2 times daily (with meals) 10/18/21   Suleman Kraus MD   albuterol sulfate HFA (VENTOLIN HFA) 108 (90 Base) MCG/ACT inhaler Inhale 2 puffs into the lungs every 6 hours as needed for Wheezing 6/2/21   CHRISTAL Dao CNP   loratadine (CLARITIN) 10 MG tablet Take 10 mg by mouth daily. Patient not taking: Reported on 1/10/2022    Historical Provider, MD       REVIEW OFSYSTEMS    (2-9 systems for level 4, 10 or more for level 5)      Review of Systems   Constitutional: Negative for chills and fever. HENT: Negative for congestion, rhinorrhea and sore throat. Respiratory: Negative for cough, chest tightness and shortness of breath. Cardiovascular: Negative for chest pain and leg swelling. Gastrointestinal: Negative for abdominal pain, constipation, diarrhea, nausea and vomiting. Genitourinary: Negative for decreased urine volume, difficulty urinating and hematuria. Musculoskeletal: Negative for arthralgias, back pain and neck pain. Skin: Negative for rash. Neurological: Positive for speech difficulty and weakness. Negative for dizziness, numbness and headaches. All other systems reviewed and are negative.       PHYSICAL EXAM   (up to 7 for level 4, 8 or more forlevel 5)      INITIAL VITALS:   Vitals:    01/15/22 1857 01/15/22 2217 01/16/22 0040   BP: (!) 159/104 135/75 106/66   Pulse: 96  69   Resp: 16  20   Temp: 98.5 °F (36.9 °C)  97 °F (36.1 °C)   TempSrc:   Oral   SpO2: 100%  96%   Weight: 200 lb (90.7 kg)          Physical Exam  Vitals and nursing note reviewed. Constitutional:       General: He is not in acute distress. Appearance: He is not toxic-appearing. Comments: Adult male sitting in stretcher no acute distress. He speaks in full sentences and answers questions appropriately. I do not appreciate any slurred speech but patient's son who is at bedside does feel like his speech is different   HENT:      Head: Normocephalic and atraumatic. Nose: Nose normal.      Mouth/Throat:      Mouth: Mucous membranes are moist.      Pharynx: Oropharynx is clear. Eyes:      Conjunctiva/sclera: Conjunctivae normal.      Pupils: Pupils are equal, round, and reactive to light. Cardiovascular:      Rate and Rhythm: Normal rate and regular rhythm. Pulses: Normal pulses. Heart sounds: No murmur heard. No friction rub. No gallop. Pulmonary:      Effort: Pulmonary effort is normal. No respiratory distress. Breath sounds: Normal breath sounds. No wheezing, rhonchi or rales. Abdominal:      General: There is no distension. Palpations: Abdomen is soft. Tenderness: There is no abdominal tenderness. Musculoskeletal:      Cervical back: Neck supple. No rigidity. Right lower leg: No edema. Left lower leg: No edema. Skin:     General: Skin is warm and dry. Capillary Refill: Capillary refill takes less than 2 seconds. Findings: No rash. Neurological:      Mental Status: He is alert. Comments: Alert and oriented x3, answers questions appropriately, speech clear  CN 2-12 intact, no facial droop  Moves all extremities spontaneously  5/5 strength flexion and extension right upper extremity.   Patient holds his left upper extremity in abduction, flexion at the elbow and internal rotation, against his abdomen. He is able to lift the arm up slightly by mostly using his shoulder muscles.  strength 5/5 on right, 2/5 on the left. 5/5 strength hip flexion, knee extension, dorsi and plantar flexion on the right, 4/5 strength on the left. Sensation decreased to light touch of the left lower extremity which patient reports is chronic   Psychiatric:         Mood and Affect: Mood normal.         Behavior: Behavior normal.         DIFFERENTIAL  DIAGNOSIS     DDX: Acute exacerbation of chronic stroke, new stroke, intracranial bleed, electrolyte abnormality, dehydration, ACS    Initial MDM/Plan: 60 y.o. male with past medical history of right basal ganglia stroke 10/14/2021 with residual left-sided weakness and numbness who presents with worsening left upper extremity weakness and slurred speech over the past 4 days. Patient reports overworking himself in physical therapy and initially attributed his symptoms to this. On arrival, patient is awake and alert and in no acute distress. He speaks in full sentences and I do not appreciate any dysarthria, however patient's son does feel like his speech is slurred. Patient has weakness to the left upper extremity which she feels is worse than his baseline. He also has 4/5 strength and decreased sensation to his left lower extremity which he feels is at his baseline. Suspect exacerbation of his chronic stroke, new stroke, intracranial bleed, overuse injury. Stroke consult was placed and CT head, CTA head and neck were ordered. Stroke panel and EKG also ordered. Anticipate admission. DIAGNOSTIC RESULTS / EMERGENCYDEPARTMENT COURSE / MDM     LABS:  Results for orders placed or performed during the hospital encounter of 01/15/22   COVID-19, Rapid    Specimen: Nasopharyngeal Swab   Result Value Ref Range    Specimen Description . NASOPHARYNGEAL SWAB     SARS-CoV-2, Rapid Not Detected Not Detected   STROKE PANEL   Result Value Ref Range    Glucose 132 (H) 70 - 99 mg/dL    BUN 17 6 - 20 mg/dL    CREATININE 0.82 0.70 - 1.20 mg/dL    Bun/Cre Ratio NOT REPORTED 9 - 20    Calcium 9.8 8.6 - 10.4 mg/dL    Sodium 138 135 - 144 mmol/L    Potassium 4.0 3.7 - 5.3 mmol/L    Chloride 99 98 - 107 mmol/L    CO2 20 20 - 31 mmol/L    Anion Gap 19 (H) 9 - 17 mmol/L    GFR Non-African American >60 >60 mL/min    GFR African American >60 >60 mL/min    GFR Comment          GFR Staging NOT REPORTED     WBC 10.1 3.5 - 11.3 k/uL    RBC 5.28 4.21 - 5.77 m/uL    Hemoglobin 16.5 13.0 - 17.0 g/dL    Hematocrit 48.9 40.7 - 50.3 %    MCV 92.6 82.6 - 102.9 fL    MCH 31.3 25.2 - 33.5 pg    MCHC 33.7 28.4 - 34.8 g/dL    RDW 13.2 11.8 - 14.4 %    Platelets 715 588 - 998 k/uL    MPV 9.5 8.1 - 13.5 fL    NRBC Automated 0.0 0.0 per 100 WBC    Total  39 - 308 U/L    CK-MB 2.6 <10.5 ng/mL    % CKMB 1.2 0.0 - 3.5 %    CKMB Interpretation NORMAL ISOENZYME PATTERN     Differential Type NOT REPORTED     Seg Neutrophils 58 36 - 65 %    Lymphocytes 33 24 - 43 %    Monocytes 7 3 - 12 %    Eosinophils % 1 1 - 4 %    Basophils 1 0 - 2 %    Immature Granulocytes 0 0 %    Segs Absolute 5.92 1.50 - 8.10 k/uL    Absolute Lymph # 3.27 1.10 - 3.70 k/uL    Absolute Mono # 0.66 0.10 - 1.20 k/uL    Absolute Eos # 0.10 0.00 - 0.44 k/uL    Basophils Absolute 0.09 0.00 - 0.20 k/uL    Absolute Immature Granulocyte <0.03 0.00 - 0.30 k/uL    WBC Morphology NOT REPORTED     RBC Morphology NOT REPORTED     Platelet Estimate NOT REPORTED     Myoglobin 76 (H) 28 - 72 ng/mL    Protime 10.6 9.1 - 12.3 sec    INR 1.0     PTT 23.6 20.5 - 30.5 sec    Troponin, High Sensitivity 9 0 - 22 ng/L    Troponin T NOT REPORTED <0.03 ng/mL    Troponin Interp NOT REPORTED          RADIOLOGY:  CT HEAD WO CONTRAST    Result Date: 1/15/2022  EXAMINATION: CT OF THE HEAD WITHOUT CONTRAST  1/15/2022 10:27 pm TECHNIQUE: CT of the head was performed without the administration of intravenous contrast. Dose modulation, iterative reconstruction, and/or weight based adjustment of the mA/kV was utilized to reduce the radiation dose to as low as reasonably achievable. COMPARISON: Noncontrast head CT dated 10/14/2021. Noncontrast head CT dated 10/15/2021. HISTORY: ORDERING SYSTEM PROVIDED HISTORY: left-sided weakness, speech changes TECHNOLOGIST PROVIDED HISTORY: left-sided weakness, speech changes Decision Support Exception - unselect if not a suspected or confirmed emergency medical condition->Emergency Medical Condition (MA) Reason for Exam: left-sided weakness, speech changes FINDINGS: BRAIN/VENTRICLES: There is no acute intracranial hemorrhage, mass effect or midline shift. No abnormal extra-axial fluid collection. The gray-white differentiation is maintained without evidence of an acute infarct. There is no evidence of hydrocephalus. Old lacunar infarct is now identified posteriorly in the right basal ganglia where the previously noted subacute infarct was located. There appears to be an additional new tiny old lacunar infarct not seen previously immediately lateral to right caudate head. ORBITS: The visualized portion of the orbits demonstrate no acute abnormality. SINUSES: The visualized paranasal sinuses and mastoid air cells demonstrate no acute abnormality. SOFT TISSUES/SKULL:  No acute abnormality of the visualized skull or soft tissues. No acute intracranial abnormality. CTA HEAD NECK W CONTRAST    Result Date: 1/15/2022  EXAMINATION: CTA OF THE HEAD AND NECK WITH CONTRAST 1/15/2022 10:27 pm: TECHNIQUE: CTA of the head and neck was performed with the administration of intravenous contrast. Multiplanar reformatted images are provided for review. MIP images are provided for review. Stenosis of the internal carotid arteries measured using NASCET criteria.  Dose modulation, iterative reconstruction, and/or weight based adjustment of the mA/kV was utilized to reduce the radiation dose to as low as reasonably achievable. COMPARISON: 10/14/2021 HISTORY: ORDERING SYSTEM PROVIDED HISTORY: left-sided weakness, slurring speech TECHNOLOGIST PROVIDED HISTORY: left-sided weakness, slurring speech Decision Support Exception - unselect if not a suspected or confirmed emergency medical condition->Emergency Medical Condition (MA) Reason for Exam: left-sided weakness, speech changes FINDINGS: CTA NECK: AORTIC ARCH/ARCH VESSELS: No dissection or arterial injury. No significant stenosis of the brachiocephalic or subclavian arteries. CAROTID ARTERIES: No dissection, arterial injury, or hemodynamically significant stenosis by NASCET criteria. VERTEBRAL ARTERIES: No dissection or arterial injury. No significant stenosis on the right. There is chronic mild to moderate focal stenosis at the origin of the left vertebral artery which does not appear significantly changed from prior. SOFT TISSUES: The lung apices are clear. No cervical or superior mediastinal lymphadenopathy. The larynx and pharynx are unremarkable. No acute abnormality of the salivary and thyroid glands. BONES: No acute osseous abnormality. CTA HEAD: ANTERIOR CIRCULATION: At the proximal intracavernous right internal carotid artery, there is borderline hemodynamically significant stenosis presumably due to soft atheromatous plaque. This appears to be new since the previous exam.  Otherwise, no significant stenosis of the intracranial internal carotid, anterior cerebral, or middle cerebral arteries. No aneurysm. POSTERIOR CIRCULATION: No significant stenosis of the vertebral, basilar, or posterior cerebral arteries. No aneurysm. OTHER: No dural venous sinus thrombosis on this non-dedicated study. BRAIN: Preserved for to the report for the dedicated noncontrast head CT. No clearly acute abnormality identified.  Mild to moderate focal stenosis at the origin of the left vertebral artery not significantly changed from prior. Borderline hemodynamically significant fusiform stenosis of the proximal intracavernous internal carotid artery on the right. EKG  Normal sinus rhythm, rate: 87  AR: 142  QRS: 92  QT/QTc: 376/452  No ST elevation or depression  No T wave abnormality    All EKG's are interpreted by the Emergency Department Physician who either signs or Co-signs this chart in the absence of a cardiologist.      MEDICATIONS ORDERED:  Orders Placed This Encounter   Medications    iopamidol (ISOVUE-370) 76 % injection 90 mL    aspirin EC tablet 81 mg    atorvastatin (LIPITOR) tablet 80 mg    lisinopril (PRINIVIL;ZESTRIL) tablet 5 mg    sodium chloride flush 0.9 % injection 5-40 mL    sodium chloride flush 0.9 % injection 5-40 mL    0.9 % sodium chloride infusion    acetaminophen (TYLENOL) tablet 650 mg    OR Linked Order Group     ondansetron (ZOFRAN-ODT) disintegrating tablet 4 mg     ondansetron (ZOFRAN) injection 4 mg    aspirin chewable tablet 324 mg    clopidogrel (PLAVIX) tablet 300 mg         PROCEDURES:  None      CONSULTS:  IP CONSULT TO NEUROCRITICAL CARE      EMERGENCY DEPARTMENT COURSE:  ED Course as of 01/16/22 0245   Sat Bg 15, 2022   1959 BUN: 17 [KA]   1959 Creatinine: 0.82 [KA]   2015 Stroke team is at bedside evaluating the patient [KA]   2040 Discussed the patient with stroke team who states that if his CT head and CTA head and neck are negative that the patient should still be admitted to the observation unit for MRI of the brain in the morning, as if he had any worsening or additional strokes, they would change his antiplatelet regimen [KA]   2158 There is a delay in patient getting his CT scans due to several stroke alerts and traumas occurring at the same time. [KA]   2257 CT HEAD WO CONTRAST  No acute intracranial abnormality. Cruz Faith   3140 Patient will be signed out to Dr. Derek Duran pending CTA results.  Plan to admit to obs for MRI in the morning, as patient's antiplatelet regimen would change if any new abnormalities present. Please see her note for final diagnosis and disposition [KA]   Sun Jan 16, 2022   0113 Patient was initially was to go observation for MRI in the morning. However after CTA head and neck, endovascular recommended further seizures to be done. Patient to be transferred to neuro critical care. I did speak to neuro critical care resident, she will reach out to neurology/stroke resident. Aspirin and Plavix ordered for patient. [AN]      ED Course User Index  [AN] Candice Bower MD  [KA] Ansley Pierre DO          FINAL IMPRESSION      1. Left-sided weakness          DISPOSITION / PLAN     DISPOSITION  Signed out      PATIENT REFERRED TO:  No follow-up provider specified.     DISCHARGE MEDICATIONS:  Current Discharge Medication List          Candice Bower MD  Emergency Medicine Resident    (Please note that portions of this note were completed with a voice recognition program.Efforts were made to edit the dictations but occasionally words are mis-transcribed.)        Ansley Pierre DO  Resident  01/15/22 4073       Candice Bower MD  Resident  01/16/22 3509

## 2022-01-15 NOTE — ED PROVIDER NOTES
[facility]     Emergency Department     Faculty Attestation    I performed a history and physical examination of the patient and discussed management with the resident. I reviewed the residents note and agree with the documented findings and plan of care. Any areas of disagreement are noted on the chart. I was personally present for the key portions of any procedures. I have documented in the chart those procedures where I was not present during the key portions. I have reviewed the emergency nurses triage note. I agree with the chief complaint, past medical history, past surgical history, allergies, medications, social and family history as documented unless otherwise noted below. For Physician Assistant/ Nurse Practitioner cases/documentation I have personally evaluated this patient and have completed at least one if not all key elements of the E/M (history, physical exam, and MDM). Additional findings are as noted. This patient was evaluated in the Emergency Department for symptoms described in the history of present illness. He/she was evaluated in the context of the global COVID-19 pandemic, which necessitated consideration that the patient might be at risk for infection with the SARS-CoV-2 virus that causes COVID-19. Institutional protocols and algorithms that pertain to the evaluation of patients at risk for COVID-19 are in a state of rapid change based on information released by regulatory bodies including the CDC and federal and state organizations. These policies and algorithms were followed during the patient's care in the ED. Patient here with possible recurrent stroke. Patient had a stroke in October of this year that left him with left-sided weakness and deficits. For the past 3 to 5 days that has been worse and new speech changes. Finally convinced to come in by family and his PCP. On exam patient is awake alert and oriented normal mental status normal pupils.   Does have mild slurring of his words. Weakness on the left side that again he states is worse than his baseline. Will call stroke consult, image, anticipate admission    EKG interpretation: Sinus from 87 normal intervals normal axis no acute ST or T changes.   Normal EKG    Critical Care     none    Bruce Mcpherson MD, Kaylah Kitchen  Attending Emergency  Physician             Bruce Mcpherson MD  01/15/22 2435

## 2022-01-16 ENCOUNTER — APPOINTMENT (OUTPATIENT)
Dept: MRI IMAGING | Age: 57
DRG: 041 | End: 2022-01-16
Payer: COMMERCIAL

## 2022-01-16 LAB
ANION GAP SERPL CALCULATED.3IONS-SCNC: 13 MMOL/L (ref 9–17)
BUN BLDV-MCNC: 16 MG/DL (ref 6–20)
BUN/CREAT BLD: ABNORMAL (ref 9–20)
CALCIUM SERPL-MCNC: 9.6 MG/DL (ref 8.6–10.4)
CHLORIDE BLD-SCNC: 100 MMOL/L (ref 98–107)
CHOLESTEROL/HDL RATIO: 3
CHOLESTEROL: 80 MG/DL
CO2: 22 MMOL/L (ref 20–31)
CREAT SERPL-MCNC: 0.71 MG/DL (ref 0.7–1.2)
ESTIMATED AVERAGE GLUCOSE: 146 MG/DL
GFR AFRICAN AMERICAN: >60 ML/MIN
GFR NON-AFRICAN AMERICAN: >60 ML/MIN
GFR SERPL CREATININE-BSD FRML MDRD: ABNORMAL ML/MIN/{1.73_M2}
GFR SERPL CREATININE-BSD FRML MDRD: ABNORMAL ML/MIN/{1.73_M2}
GLUCOSE BLD-MCNC: 148 MG/DL (ref 75–110)
GLUCOSE BLD-MCNC: 149 MG/DL (ref 70–99)
HBA1C MFR BLD: 6.7 % (ref 4–6)
HCT VFR BLD CALC: 45 % (ref 40.7–50.3)
HDLC SERPL-MCNC: 27 MG/DL
HEMOGLOBIN: 14.9 G/DL (ref 13–17)
LDL CHOLESTEROL: 27 MG/DL (ref 0–130)
MCH RBC QN AUTO: 30.6 PG (ref 25.2–33.5)
MCHC RBC AUTO-ENTMCNC: 33.1 G/DL (ref 28.4–34.8)
MCV RBC AUTO: 92.4 FL (ref 82.6–102.9)
NRBC AUTOMATED: 0 PER 100 WBC
PDW BLD-RTO: 13.1 % (ref 11.8–14.4)
PLATELET # BLD: 204 K/UL (ref 138–453)
PMV BLD AUTO: 9.8 FL (ref 8.1–13.5)
POTASSIUM SERPL-SCNC: 3.7 MMOL/L (ref 3.7–5.3)
RBC # BLD: 4.87 M/UL (ref 4.21–5.77)
SARS-COV-2, RAPID: NOT DETECTED
SODIUM BLD-SCNC: 135 MMOL/L (ref 135–144)
SPECIMEN DESCRIPTION: NORMAL
TRIGL SERPL-MCNC: 128 MG/DL
VLDLC SERPL CALC-MCNC: ABNORMAL MG/DL (ref 1–30)
WBC # BLD: 8.2 K/UL (ref 3.5–11.3)

## 2022-01-16 PROCEDURE — 70551 MRI BRAIN STEM W/O DYE: CPT

## 2022-01-16 PROCEDURE — 99253 IP/OBS CNSLTJ NEW/EST LOW 45: CPT | Performed by: PSYCHIATRY & NEUROLOGY

## 2022-01-16 PROCEDURE — 6370000000 HC RX 637 (ALT 250 FOR IP): Performed by: STUDENT IN AN ORGANIZED HEALTH CARE EDUCATION/TRAINING PROGRAM

## 2022-01-16 PROCEDURE — 87635 SARS-COV-2 COVID-19 AMP PRB: CPT

## 2022-01-16 PROCEDURE — 81241 F5 GENE: CPT

## 2022-01-16 PROCEDURE — 97535 SELF CARE MNGMENT TRAINING: CPT

## 2022-01-16 PROCEDURE — 85027 COMPLETE CBC AUTOMATED: CPT

## 2022-01-16 PROCEDURE — 6370000000 HC RX 637 (ALT 250 FOR IP): Performed by: PSYCHIATRY & NEUROLOGY

## 2022-01-16 PROCEDURE — 1200000000 HC SEMI PRIVATE

## 2022-01-16 PROCEDURE — G0378 HOSPITAL OBSERVATION PER HR: HCPCS

## 2022-01-16 PROCEDURE — 97162 PT EVAL MOD COMPLEX 30 MIN: CPT

## 2022-01-16 PROCEDURE — 36415 COLL VENOUS BLD VENIPUNCTURE: CPT

## 2022-01-16 PROCEDURE — 83036 HEMOGLOBIN GLYCOSYLATED A1C: CPT

## 2022-01-16 PROCEDURE — 82947 ASSAY GLUCOSE BLOOD QUANT: CPT

## 2022-01-16 PROCEDURE — 81291 MTHFR GENE: CPT

## 2022-01-16 PROCEDURE — 99233 SBSQ HOSP IP/OBS HIGH 50: CPT | Performed by: PSYCHIATRY & NEUROLOGY

## 2022-01-16 PROCEDURE — 97166 OT EVAL MOD COMPLEX 45 MIN: CPT

## 2022-01-16 PROCEDURE — 80061 LIPID PANEL: CPT

## 2022-01-16 PROCEDURE — 92523 SPEECH SOUND LANG COMPREHEN: CPT

## 2022-01-16 PROCEDURE — 80048 BASIC METABOLIC PNL TOTAL CA: CPT

## 2022-01-16 PROCEDURE — 97116 GAIT TRAINING THERAPY: CPT

## 2022-01-16 PROCEDURE — 81240 F2 GENE: CPT

## 2022-01-16 PROCEDURE — 2060000000 HC ICU INTERMEDIATE R&B

## 2022-01-16 PROCEDURE — 2580000003 HC RX 258: Performed by: STUDENT IN AN ORGANIZED HEALTH CARE EDUCATION/TRAINING PROGRAM

## 2022-01-16 PROCEDURE — 99223 1ST HOSP IP/OBS HIGH 75: CPT | Performed by: PSYCHIATRY & NEUROLOGY

## 2022-01-16 RX ORDER — LABETALOL HYDROCHLORIDE 5 MG/ML
10 INJECTION, SOLUTION INTRAVENOUS EVERY 10 MIN PRN
Status: DISCONTINUED | OUTPATIENT
Start: 2022-01-16 | End: 2022-01-17 | Stop reason: HOSPADM

## 2022-01-16 RX ORDER — ACETAMINOPHEN 325 MG/1
650 TABLET ORAL EVERY 4 HOURS PRN
Status: DISCONTINUED | OUTPATIENT
Start: 2022-01-16 | End: 2022-01-16

## 2022-01-16 RX ORDER — ONDANSETRON 2 MG/ML
4 INJECTION INTRAMUSCULAR; INTRAVENOUS EVERY 6 HOURS PRN
Status: DISCONTINUED | OUTPATIENT
Start: 2022-01-16 | End: 2022-01-16

## 2022-01-16 RX ORDER — ATORVASTATIN CALCIUM 80 MG/1
80 TABLET, FILM COATED ORAL NIGHTLY
Status: DISCONTINUED | OUTPATIENT
Start: 2022-01-16 | End: 2022-01-17 | Stop reason: HOSPADM

## 2022-01-16 RX ORDER — CLOPIDOGREL BISULFATE 75 MG/1
75 TABLET ORAL DAILY
Status: DISCONTINUED | OUTPATIENT
Start: 2022-01-16 | End: 2022-01-17 | Stop reason: HOSPADM

## 2022-01-16 RX ORDER — ONDANSETRON 4 MG/1
4 TABLET, ORALLY DISINTEGRATING ORAL EVERY 8 HOURS PRN
Status: DISCONTINUED | OUTPATIENT
Start: 2022-01-16 | End: 2022-01-17 | Stop reason: HOSPADM

## 2022-01-16 RX ORDER — SODIUM CHLORIDE 9 MG/ML
INJECTION, SOLUTION INTRAVENOUS CONTINUOUS
Status: DISCONTINUED | OUTPATIENT
Start: 2022-01-16 | End: 2022-01-17 | Stop reason: HOSPADM

## 2022-01-16 RX ORDER — ONDANSETRON 4 MG/1
4 TABLET, ORALLY DISINTEGRATING ORAL EVERY 8 HOURS PRN
Status: DISCONTINUED | OUTPATIENT
Start: 2022-01-16 | End: 2022-01-16

## 2022-01-16 RX ORDER — DEXTROSE MONOHYDRATE 50 MG/ML
100 INJECTION, SOLUTION INTRAVENOUS PRN
Status: DISCONTINUED | OUTPATIENT
Start: 2022-01-16 | End: 2022-01-17 | Stop reason: HOSPADM

## 2022-01-16 RX ORDER — ASPIRIN 81 MG/1
81 TABLET ORAL DAILY
Status: DISCONTINUED | OUTPATIENT
Start: 2022-01-16 | End: 2022-01-17 | Stop reason: HOSPADM

## 2022-01-16 RX ORDER — FAMOTIDINE 20 MG/1
20 TABLET, FILM COATED ORAL 2 TIMES DAILY
Status: DISCONTINUED | OUTPATIENT
Start: 2022-01-16 | End: 2022-01-17 | Stop reason: HOSPADM

## 2022-01-16 RX ORDER — LISINOPRIL 5 MG/1
5 TABLET ORAL DAILY
Status: DISCONTINUED | OUTPATIENT
Start: 2022-01-16 | End: 2022-01-17 | Stop reason: HOSPADM

## 2022-01-16 RX ORDER — CLOPIDOGREL BISULFATE 75 MG/1
300 TABLET ORAL ONCE
Status: DISCONTINUED | OUTPATIENT
Start: 2022-01-16 | End: 2022-01-17 | Stop reason: HOSPADM

## 2022-01-16 RX ORDER — ONDANSETRON 2 MG/ML
4 INJECTION INTRAMUSCULAR; INTRAVENOUS EVERY 6 HOURS PRN
Status: DISCONTINUED | OUTPATIENT
Start: 2022-01-16 | End: 2022-01-17 | Stop reason: HOSPADM

## 2022-01-16 RX ORDER — SODIUM CHLORIDE 0.9 % (FLUSH) 0.9 %
5-40 SYRINGE (ML) INJECTION PRN
Status: DISCONTINUED | OUTPATIENT
Start: 2022-01-16 | End: 2022-01-17 | Stop reason: HOSPADM

## 2022-01-16 RX ORDER — NICOTINE 21 MG/24HR
1 PATCH, TRANSDERMAL 24 HOURS TRANSDERMAL DAILY
Status: DISCONTINUED | OUTPATIENT
Start: 2022-01-16 | End: 2022-01-17 | Stop reason: HOSPADM

## 2022-01-16 RX ORDER — ASPIRIN 81 MG/1
324 TABLET, CHEWABLE ORAL ONCE
Status: DISCONTINUED | OUTPATIENT
Start: 2022-01-16 | End: 2022-01-17 | Stop reason: HOSPADM

## 2022-01-16 RX ORDER — SODIUM CHLORIDE 9 MG/ML
25 INJECTION, SOLUTION INTRAVENOUS PRN
Status: DISCONTINUED | OUTPATIENT
Start: 2022-01-16 | End: 2022-01-17 | Stop reason: HOSPADM

## 2022-01-16 RX ORDER — SODIUM CHLORIDE 0.9 % (FLUSH) 0.9 %
5-40 SYRINGE (ML) INJECTION EVERY 12 HOURS SCHEDULED
Status: DISCONTINUED | OUTPATIENT
Start: 2022-01-16 | End: 2022-01-17 | Stop reason: HOSPADM

## 2022-01-16 RX ORDER — ACETAMINOPHEN 650 MG/1
650 SUPPOSITORY RECTAL EVERY 4 HOURS PRN
Status: DISCONTINUED | OUTPATIENT
Start: 2022-01-16 | End: 2022-01-17 | Stop reason: HOSPADM

## 2022-01-16 RX ORDER — DEXTROSE MONOHYDRATE 25 G/50ML
12.5 INJECTION, SOLUTION INTRAVENOUS PRN
Status: DISCONTINUED | OUTPATIENT
Start: 2022-01-16 | End: 2022-01-17 | Stop reason: HOSPADM

## 2022-01-16 RX ORDER — NICOTINE POLACRILEX 4 MG
15 LOZENGE BUCCAL PRN
Status: DISCONTINUED | OUTPATIENT
Start: 2022-01-16 | End: 2022-01-17 | Stop reason: HOSPADM

## 2022-01-16 RX ORDER — LORAZEPAM 2 MG/ML
0.5 INJECTION INTRAMUSCULAR
Status: ACTIVE | OUTPATIENT
Start: 2022-01-16 | End: 2022-01-16

## 2022-01-16 RX ORDER — POLYETHYLENE GLYCOL 3350 17 G/17G
17 POWDER, FOR SOLUTION ORAL DAILY PRN
Status: DISCONTINUED | OUTPATIENT
Start: 2022-01-16 | End: 2022-01-17 | Stop reason: HOSPADM

## 2022-01-16 RX ORDER — ACETAMINOPHEN 325 MG/1
650 TABLET ORAL EVERY 4 HOURS PRN
Status: DISCONTINUED | OUTPATIENT
Start: 2022-01-16 | End: 2022-01-17 | Stop reason: HOSPADM

## 2022-01-16 RX ADMIN — SODIUM CHLORIDE, PRESERVATIVE FREE 10 ML: 5 INJECTION INTRAVENOUS at 20:17

## 2022-01-16 RX ADMIN — CLOPIDOGREL 75 MG: 75 TABLET, FILM COATED ORAL at 09:57

## 2022-01-16 RX ADMIN — ATORVASTATIN CALCIUM 80 MG: 80 TABLET, FILM COATED ORAL at 20:17

## 2022-01-16 RX ADMIN — Medication 81 MG: at 09:57

## 2022-01-16 RX ADMIN — LISINOPRIL 5 MG: 5 TABLET ORAL at 09:58

## 2022-01-16 RX ADMIN — FAMOTIDINE 20 MG: 20 TABLET, FILM COATED ORAL at 20:17

## 2022-01-16 RX ADMIN — ATORVASTATIN CALCIUM 80 MG: 80 TABLET, FILM COATED ORAL at 01:00

## 2022-01-16 ASSESSMENT — PAIN SCALES - GENERAL
PAINLEVEL_OUTOF10: 0
PAINLEVEL_OUTOF10: 0

## 2022-01-16 ASSESSMENT — ENCOUNTER SYMPTOMS
ABDOMINAL PAIN: 0
CONSTIPATION: 0
RHINORRHEA: 0
DIARRHEA: 0
COUGH: 0
BACK PAIN: 0
CHEST TIGHTNESS: 0
VOMITING: 0
SHORTNESS OF BREATH: 0
SORE THROAT: 0
NAUSEA: 0

## 2022-01-16 NOTE — ED PROVIDER NOTES
[facility]   Emergency Department  Emergency Medicine Attending Sign-out     Care of Matthew Martell was assumed from previous attending Dr. Norberto Savage and is being seen for Cerebrovascular Accident (Increased weakness to LUE compared to prior movement from CVA on October 13)  . The patient's initial evaluation and plan have been discussed with the prior provider who initially evaluated the patient. Attestation  I was available and discussed any additional care issues that arose and coordinated the management plans with the resident(s) caring for the patient during my duty period. Any areas of disagreement with resident's documentation of care or procedures are noted on the chart. I was personally present for the key portions of any/all procedures, during my duty period. I have documented in the chart those procedures where I was not present during the key portions. BRIEF PATIENT SUMMARY/MDM COURSE PER INITIAL PROVIDER:   RECENT VITALS:     Temp: 98.5 °F (36.9 °C),  Pulse: 96, Resp: 16, BP: 135/75, SpO2: 100 %    This patient is a 60 y.o. Male with prior CVA and now with worsening symptoms.   Awaiting plan from neuro team.     DIAGNOSTICS/MEDICATIONS:     MEDICATIONS GIVEN:  ED Medication Orders (From admission, onward)    Start Ordered     Status Ordering Provider    01/15/22 2227 01/15/22 2227  iopamidol (ISOVUE-370) 76 % injection 90 mL  IMG ONCE PRN         Last MAR action: Given - by Moses Aviles on 01/15/22 at [facility], [address] [place] [address] PANEL - Abnormal; Notable for the following components:       Result Value    Glucose 132 (*)     Anion Gap 19 (*)     Myoglobin 76 (*)     All other components within normal limits       RADIOLOGY  CT HEAD WO CONTRAST    Result Date: 1/15/2022  EXAMINATION: CT OF THE HEAD WITHOUT CONTRAST  1/15/2022 10:27 pm TECHNIQUE: CT of the head was performed without the administration of intravenous contrast. Dose modulation, iterative reconstruction, and/or weight based adjustment of the mA/kV was utilized to reduce the radiation dose to as low as reasonably achievable. COMPARISON: Noncontrast head CT dated 10/14/2021. Noncontrast head CT dated 10/15/2021. HISTORY: ORDERING SYSTEM PROVIDED HISTORY: left-sided weakness, speech changes TECHNOLOGIST PROVIDED HISTORY: left-sided weakness, speech changes Decision Support Exception - unselect if not a suspected or confirmed emergency medical condition->Emergency Medical Condition (MA) Reason for Exam: left-sided weakness, speech changes FINDINGS: BRAIN/VENTRICLES: There is no acute intracranial hemorrhage, mass effect or midline shift. No abnormal extra-axial fluid collection. The gray-white differentiation is maintained without evidence of an acute infarct. There is no evidence of hydrocephalus. Old lacunar infarct is now identified posteriorly in the right basal ganglia where the previously noted subacute infarct was located. There appears to be an additional new tiny old lacunar infarct not seen previously immediately lateral to right caudate head. ORBITS: The visualized portion of the orbits demonstrate no acute abnormality. SINUSES: The visualized paranasal sinuses and mastoid air cells demonstrate no acute abnormality. SOFT TISSUES/SKULL:  No acute abnormality of the visualized skull or soft tissues. No acute intracranial abnormality. OUTSTANDING TASKS / ADDITIONAL COMMENTS   1.  Murray Reddy MD  Emergency Medicine Attending  [facility]        Claudy Fabian MD  01/18/22 4409

## 2022-01-16 NOTE — CONSULTS
[facility] Neurology   IN-PATIENT SERVICE      NEUROLOGY CONSULT  NOTE            Date:   1/16/2022  Patient name:  Magda Ordonez  Date of admission:  1/15/2022  YOB: 1965      Chief Complaint:     Chief Complaint   Patient presents with    Cerebrovascular Accident     Increased weakness to LUE compared to prior movement from CVA on October 13       Reason for Consult:      Left upper extremity weakness    History of Present Illness: The patient is a 60 y.o. male who presents with worsening left upper extremity weakness status post previous stroke in October last year, patient has a history of hypertension, chronic smoker, left upper extremity and left lower extremity weakness and numbness since last October, when he presented with initial NIH of two initial blood pressure of 200 MRI at that time showed subacute stroke in the right basal ganglia, echo 51% with negative bubble study, patient loaded with aspirin and Plavix and discharged on aspirin and Plavix for 21 days then aspirin monotherapy, he is on Lipitor 80 mg  Patient last seen in the office as a telehealth with Dr. Freddie Mckeon on 1/10/2022 patient was oriented x4 and he stated that he is improving with left-sided weakness, aspirin 81 Lipitor 80 mg,     -However since Tuesday patient was getting worse on the left upper extremity weakness, patient was overworked and physical therapy on Tuesday after that he had significant worsening of left upper extremity weakness, patient has baseline mild dysarthria, baseline left lower extremity weakness no drift,    LKW: Last Tuesday, 1/11/2022  -NIH of three, two for left upper extremity drift, one for dysarthria            Past Medical History:     Past Medical History:   Diagnosis Date    Hypertension     Tobacco abuse         Past Surgical History:     History reviewed. No pertinent surgical history.      Medications Prior to Admission:     Prior to Admission medications    Medication Sig Start Date End Date Taking? Authorizing Provider   lisinopril (PRINIVIL;ZESTRIL) 5 MG tablet take 1 tablet by mouth once daily 1/3/22   Historical Provider, MD   aspirin 81 MG EC tablet Take 1 tablet by mouth daily 1/10/22   Rick Leon MD   atorvastatin (LIPITOR) 80 MG tablet Take 1 tablet by mouth nightly 1/10/22   Rick Leon MD   metFORMIN (GLUCOPHAGE) 500 MG tablet Take 1 tablet by mouth 2 times daily (with meals) 10/18/21   Felix Guardado MD   albuterol sulfate HFA (VENTOLIN HFA) 108 (90 Base) MCG/ACT inhaler Inhale 2 puffs into the lungs every 6 hours as needed for Wheezing 21   Kseniaan Son, APRN - CNP   loratadine (CLARITIN) 10 MG tablet Take 10 mg by mouth daily. Patient not taking: Reported on 1/10/2022    Historical Provider, MD        Allergies:     Penicillins    Social History:     Tobacco:    reports that he has been smoking cigarettes. He has a 30.00 pack-year smoking history. He has never used smokeless tobacco.  Alcohol:      reports current alcohol use. Drug Use:  reports current drug use. Drug: Marijuana [facility]). Family History:     History reviewed. No pertinent family history.     Review of Systems:       Constitutional Negative for fever and chills   HEENT Negative for ear discharge, ear pain, nosebleed   Eyes Negative for photophobia, pain and discharge   Respiratory Negative for hemoptysis and sputum   Cardiovascular Negative for orthopnea, claudication and PND   Gastrointestinal Negative for abdominal pain, diarrhea, blood in stool   Musculoskeletal Negative for joint pain, negative for myalgia   Skin Negative for rash or itching   hematology Negative for ecchymosis, anemia   Psychiatric Negative for suicidal ideation, anxiety, depression, hallucinations       Physical Exam:   /75   Pulse 96   Temp 98.5 °F (36.9 °C)   Resp 16   Wt 200 lb (90.7 kg)   SpO2 100%   BMI 29.11 kg/m²   Temp (24hrs), Av.5 °F (36.9 °C), Min:98.5 °F (36.9 °C), Max:98.5 °F (36.9 °C)          CONSTITUTIONAL:  Well developed, well nourished, alert and oriented x 3, in no acute distress. GCS 15, nontoxic. Mild dysarthria, no aphasia. EOMI. HEAD:  normocephalic, atraumatic    EYES:  PERRLA, EOMI.   ENT:  moist mucous membranes   NECK:  supple, symmetric, no midline tenderness to palpation    BACK:  without midline tenderness, step-offs or deformities    LUNGS:  Equal air entry bilaterally   CARDIOVASCULAR:  normal s1 / s2   ABDOMEN:  Soft, no rigidity   NEUROLOGIC:  Mental Status:  A & O x3,awake             Cranial Nerves:    cranial nerves II-XII are grossly intact     Motor Exam:    -Left upper extremity severe weakness and drift, otherwise normal extremities  -Left upper extremity, 2/5,  -Left lower extremity 4+/5  -Right side 5/5     Sensory:    Touch:    Normal sensation all four extremities and face.     Plantar Response:  Right:  equivocal  Left:  equivocal     Clonus:  absent  Fairchild's:  absent     Coordination/Dysmetria:  Normal coordination on the right side, unable to assess left upper extremity due to weakness     Gait: Not assessed, patient mentioned that he came in walking     INITIAL NIH STROKE SCALE:     1a. Level of consciousness:  0 - alert; keenly responsive  1b. Level of consciousness questions:  0 - answers both questions correctly  1c. Level of consciousness questions:  0 - performs both tasks correctly  2. Best Gaze:  0 - normal  3. Visual:  0 - no visual loss  4. Facial Palsy:  0 - normal symmetric movement  5a. Motor left arm:  2 - some effort against gravity, limb cannot get to or maintain (if cued) 90 (or 45) degrees, drifts down to bed, but has some effort against gravity   5b. Motor right arm:  0 - no drift, limb holds 90 (or 45) degrees for full 10 seconds  6a. Motor left le - no drift; leg holds 30 degree position for full 5 seconds  6b. Motor right le - no drift; leg holds 30 degree position for full 5 seconds  7.     Limb Ataxia: 0 - absent  8. Sensory:  0 - normal; no sensory loss  9. Best Language:  0 - no aphasia, normal  10. Dysarthria:  1 - mild to moderate, patient slurs at least some words and at worst, can be understood with some difficulty  11. Extinction and Inattention:  0 - no abnormality     TOTAL:  3      SKIN:  no rash           Diagnostics:      Laboratory Testing:  CBC:   Recent Labs     01/15/22  1907   WBC 10.1   HGB 16.5        BMP:    Recent Labs     01/15/22  1907      K 4.0   CL 99   CO2 20   BUN 17   CREATININE 0.82   GLUCOSE 132*         Lab Results   Component Value Date    CHOL 151 10/15/2021    LDLCHOLESTEROL 97 10/15/2021    HDL 28 (L) 10/15/2021    TRIG 128 10/15/2021    INR 1.0 01/15/2022    LABA1C 7.7 (H) 10/15/2021       No results found for: PHENYTOIN, PHENYTOIN, VALPROATE, CBMZ      Imaging/Diagnostics:  CT HEAD WO CONTRAST    Result Date: 1/15/2022  EXAMINATION: CT OF THE HEAD WITHOUT CONTRAST  1/15/2022 10:27 pm TECHNIQUE: CT of the head was performed without the administration of intravenous contrast. Dose modulation, iterative reconstruction, and/or weight based adjustment of the mA/kV was utilized to reduce the radiation dose to as low as reasonably achievable. COMPARISON: Noncontrast head CT dated 10/14/2021. Noncontrast head CT dated 10/15/2021. HISTORY: ORDERING SYSTEM PROVIDED HISTORY: left-sided weakness, speech changes TECHNOLOGIST PROVIDED HISTORY: left-sided weakness, speech changes Decision Support Exception - unselect if not a suspected or confirmed emergency medical condition->Emergency Medical Condition (MA) Reason for Exam: left-sided weakness, speech changes FINDINGS: BRAIN/VENTRICLES: There is no acute intracranial hemorrhage, mass effect or midline shift. No abnormal extra-axial fluid collection. The gray-white differentiation is maintained without evidence of an acute infarct. There is no evidence of hydrocephalus.  Old lacunar infarct is now identified posteriorly in the right basal ganglia where the previously noted subacute infarct was located. There appears to be an additional new tiny old lacunar infarct not seen previously immediately lateral to right caudate head. ORBITS: The visualized portion of the orbits demonstrate no acute abnormality. SINUSES: The visualized paranasal sinuses and mastoid air cells demonstrate no acute abnormality. SOFT TISSUES/SKULL:  No acute abnormality of the visualized skull or soft tissues. No acute intracranial abnormality. CTA HEAD NECK W CONTRAST    Result Date: 1/15/2022  EXAMINATION: CTA OF THE HEAD AND NECK WITH CONTRAST 1/15/2022 10:27 pm: TECHNIQUE: CTA of the head and neck was performed with the administration of intravenous contrast. Multiplanar reformatted images are provided for review. MIP images are provided for review. Stenosis of the internal carotid arteries measured using NASCET criteria. Dose modulation, iterative reconstruction, and/or weight based adjustment of the mA/kV was utilized to reduce the radiation dose to as low as reasonably achievable. COMPARISON: 10/14/2021 HISTORY: ORDERING SYSTEM PROVIDED HISTORY: left-sided weakness, slurring speech TECHNOLOGIST PROVIDED HISTORY: left-sided weakness, slurring speech Decision Support Exception - unselect if not a suspected or confirmed emergency medical condition->Emergency Medical Condition (MA) Reason for Exam: left-sided weakness, speech changes FINDINGS: CTA NECK: AORTIC ARCH/ARCH VESSELS: No dissection or arterial injury. No significant stenosis of the brachiocephalic or subclavian arteries. CAROTID ARTERIES: No dissection, arterial injury, or hemodynamically significant stenosis by NASCET criteria. VERTEBRAL ARTERIES: No dissection or arterial injury. No significant stenosis on the right. There is chronic mild to moderate focal stenosis at the origin of the left vertebral artery which does not appear significantly changed from prior.  SOFT TISSUES: The lung apices are clear. No cervical or superior mediastinal lymphadenopathy. The larynx and pharynx are unremarkable. No acute abnormality of the salivary and thyroid glands. BONES: No acute osseous abnormality. CTA HEAD: ANTERIOR CIRCULATION: At the proximal intracavernous right internal carotid artery, there is borderline hemodynamically significant stenosis presumably due to soft atheromatous plaque. This appears to be new since the previous exam.  Otherwise, no significant stenosis of the intracranial internal carotid, anterior cerebral, or middle cerebral arteries. No aneurysm. POSTERIOR CIRCULATION: No significant stenosis of the vertebral, basilar, or posterior cerebral arteries. No aneurysm. OTHER: No dural venous sinus thrombosis on this non-dedicated study. BRAIN: Preserved for to the report for the dedicated noncontrast head CT. No clearly acute abnormality identified. Mild to moderate focal stenosis at the origin of the left vertebral artery not significantly changed from prior. Borderline hemodynamically significant fusiform stenosis of the proximal intracavernous internal carotid artery on the right.            Impression and plan:      58-year-old male came in with worsening of left upper extremity weakness after physical therapy on Tuesday, last known well last Tuesday, patient last seen in the clinic with Dr. Shade Zavaleta on 1/10/2022, patient has a history of right basal ganglia stroke in October last year, residual symptoms of left upper extremity weakness left lower extremity weakness, dysarthria, mild facial droop, patient was getting better till last Tuesday when he overworked with physical therapy, patient came in concerned about increased weakness in left upper extremity, patient on aspirin 81 mg and Lipitor 80 mg,  -LK W: 1/11/2022, NIH of 3,  -CT head: Unremarkable CTA:  Mild to moderate focal stenosis at the origin of the left vertebral artery not significantly changed from prior.   Borderline hemodynamically significant fusiform stenosis of the proximal intracavernous internal carotid artery on the right.       -Case discussed with , most likely recrudescence stroke, no new symptoms, patient at baseline status post stroke in October, patient will go through MRI tomorrow in the morning and will rule out any further stroke, patient meanwhile will be admitted to observation unit   Neurology team will follow in the morning, will rule out stroke after MRI      Electronically signed by Damaris Santos MD on 1/16/2022 at 12:08 AM      Electronically signed by   Damaris Santos MD  1/16/2022  12:08 AM

## 2022-01-16 NOTE — ED PROVIDER NOTES
[facility]  ED  Emergency Department Encounter  Emergency Medicine Resident     Pt Name: Shimon Norris  MRN: [id number]  Armsmarisabelgfcandis 1965  Date of evaluation: 1/15/22  PCP:  No primary care provider on file. CHIEF COMPLAINT       Chief Complaint   Patient presents with    Cerebrovascular Accident     Increased weakness to LUE compared to prior movement from CVA on October 13       HISTORY OFPRESENT ILLNESS  (Location/Symptom, Timing/Onset, Context/Setting, Quality, Duration, Modifying Factors,Severity.)      Shimon Norris is a 60 y.o. male with past medical history of right basal ganglia stroke 10/14/2021 with residual left-sided weakness and numbness who presents with worsening left upper extremity weakness and slurred speech over the past 4 days. Patient reports that he is undergoing physical therapy for his left-sided weakness related to his prior stroke. He states that he has been working very hard in physical therapy and initially attributed his increased weakness to this. However, his family also states that they noticed slurred speech and encouraged him daily to come to the emergency department until he finally did 4 days later. Patient denies headache, lightheadedness, dizziness, chest pain, shortness of breath, numbness. He is mostly concerned because he is unable to lift his left arm as high as he usually is able to. He denies any trauma or injury. He has been taking his medications as prescribed. He takes aspirin 81 mg but no other blood thinners. Patient did not receive tPA or thrombectomy from his prior stroke. He was loaded with aspirin and Plavix and managed medically. Patient did require a Cardene drip at that time. Blood pressure is currently 159/104. A stroke alert was paged on arrival.    PAST MEDICAL / SURGICAL / SOCIAL / FAMILY HISTORY      has a past medical history of Hypertension and Tobacco abuse.     Denies past surgical history    Social:  reports that he has been smoking cigarettes. He has a 30.00 pack-year smoking history. He has never used smokeless tobacco. He reports current alcohol use. He reports current drug use. Drug: Marijuana Lexi Bachelor). Family Hx: History reviewed. No pertinent family history. Allergies:  Penicillins    Home Medications:  Prior to Admission medications    Medication Sig Start Date End Date Taking? Authorizing Provider   lisinopril (PRINIVIL;ZESTRIL) 5 MG tablet take 1 tablet by mouth once daily 1/3/22   Historical Provider, MD   aspirin 81 MG EC tablet Take 1 tablet by mouth daily 1/10/22   Susie Vaca MD   atorvastatin (LIPITOR) 80 MG tablet Take 1 tablet by mouth nightly 1/10/22   Susie Vaca MD   metFORMIN (GLUCOPHAGE) 500 MG tablet Take 1 tablet by mouth 2 times daily (with meals) 10/18/21   Danny Julien MD   albuterol sulfate HFA (VENTOLIN HFA) 108 (90 Base) MCG/ACT inhaler Inhale 2 puffs into the lungs every 6 hours as needed for Wheezing 6/2/21   CHRISTAL Mckoy CNP   loratadine (CLARITIN) 10 MG tablet Take 10 mg by mouth daily. Patient not taking: Reported on 1/10/2022    Historical Provider, MD       REVIEW OFSYSTEMS    (2-9 systems for level 4, 10 or more for level 5)      Review of Systems   Constitutional: Negative for chills and fever. HENT: Negative for congestion, rhinorrhea and sore throat. Respiratory: Negative for cough, chest tightness and shortness of breath. Cardiovascular: Negative for chest pain and leg swelling. Gastrointestinal: Negative for abdominal pain, constipation, diarrhea, nausea and vomiting. Genitourinary: Negative for decreased urine volume, difficulty urinating and hematuria. Musculoskeletal: Negative for arthralgias, back pain and neck pain. Skin: Negative for rash. Neurological: Positive for speech difficulty and weakness. Negative for dizziness, numbness and headaches. All other systems reviewed and are negative.       PHYSICAL EXAM   (up to 7 for level 4, 8 or more forlevel 5)      INITIAL VITALS:   Vitals:    01/16/22 1125 01/16/22 1200 01/16/22 1300 01/16/22 1400   BP: (!) 147/101  (!) 151/77 (!) 161/94   Pulse: 69  70 69   Resp: 14  16 17   Temp:  98.5 °F (36.9 °C)     TempSrc:       SpO2:   95% 95%   Weight:            Physical Exam  Vitals and nursing note reviewed. Constitutional:       General: He is not in acute distress. Appearance: He is not toxic-appearing. Comments: Adult male sitting in stretcher no acute distress. He speaks in full sentences and answers questions appropriately. I do not appreciate any slurred speech but patient's son who is at bedside does feel like his speech is different   HENT:      Head: Normocephalic and atraumatic. Nose: Nose normal.      Mouth/Throat:      Mouth: Mucous membranes are moist.      Pharynx: Oropharynx is clear. Eyes:      Conjunctiva/sclera: Conjunctivae normal.      Pupils: Pupils are equal, round, and reactive to light. Cardiovascular:      Rate and Rhythm: Normal rate and regular rhythm. Pulses: Normal pulses. Heart sounds: No murmur heard. No friction rub. No gallop. Pulmonary:      Effort: Pulmonary effort is normal. No respiratory distress. Breath sounds: Normal breath sounds. No wheezing, rhonchi or rales. Abdominal:      General: There is no distension. Palpations: Abdomen is soft. Tenderness: There is no abdominal tenderness. Musculoskeletal:      Cervical back: Neck supple. No rigidity. Right lower leg: No edema. Left lower leg: No edema. Skin:     General: Skin is warm and dry. Capillary Refill: Capillary refill takes less than 2 seconds. Findings: No rash. Neurological:      Mental Status: He is alert. Comments: Alert and oriented x3, answers questions appropriately, speech clear  CN 2-12 intact, no facial droop  Moves all extremities spontaneously  5/5 strength flexion and extension right upper extremity.   Patient holds his left upper extremity in abduction, flexion at the elbow and internal rotation, against his abdomen. He is able to lift the arm up slightly by mostly using his shoulder muscles.  strength 5/5 on right, 2/5 on the left. 5/5 strength hip flexion, knee extension, dorsi and plantar flexion on the right, 4/5 strength on the left. Sensation decreased to light touch of the left lower extremity which patient reports is chronic   Psychiatric:         Mood and Affect: Mood normal.         Behavior: Behavior normal.         DIFFERENTIAL  DIAGNOSIS     DDX: Acute exacerbation of chronic stroke, new stroke, intracranial bleed, electrolyte abnormality, dehydration, ACS    Initial MDM/Plan: 60 y.o. male with past medical history of right basal ganglia stroke 10/14/2021 with residual left-sided weakness and numbness who presents with worsening left upper extremity weakness and slurred speech over the past 4 days. Patient reports overworking himself in physical therapy and initially attributed his symptoms to this. On arrival, patient is awake and alert and in no acute distress. He speaks in full sentences and I do not appreciate any dysarthria, however patient's son does feel like his speech is slurred. Patient has weakness to the left upper extremity which she feels is worse than his baseline. He also has 4/5 strength and decreased sensation to his left lower extremity which he feels is at his baseline. Suspect exacerbation of his chronic stroke, new stroke, intracranial bleed, overuse injury. Stroke consult was placed and CT head, CTA head and neck were ordered. Stroke panel and EKG also ordered. Anticipate admission. DIAGNOSTIC RESULTS / EMERGENCYDEPARTMENT COURSE / MDM     LABS:  Results for orders placed or performed during the hospital encounter of 01/15/22   COVID-19, Rapid    Specimen: Nasopharyngeal Swab   Result Value Ref Range    Specimen Description . NASOPHARYNGEAL SWAB     SARS-CoV-2, Rapid Not Detected Not Detected   STROKE PANEL   Result Value Ref Range    Glucose 132 (H) 70 - 99 mg/dL    BUN 17 6 - 20 mg/dL    CREATININE 0.82 0.70 - 1.20 mg/dL    Bun/Cre Ratio NOT REPORTED 9 - 20    Calcium 9.8 8.6 - 10.4 mg/dL    Sodium 138 135 - 144 mmol/L    Potassium 4.0 3.7 - 5.3 mmol/L    Chloride 99 98 - 107 mmol/L    CO2 20 20 - 31 mmol/L    Anion Gap 19 (H) 9 - 17 mmol/L    GFR Non-African American >60 >60 mL/min    GFR African American >60 >60 mL/min    GFR Comment          GFR Staging NOT REPORTED     WBC 10.1 3.5 - 11.3 k/uL    RBC 5.28 4.21 - 5.77 m/uL    Hemoglobin 16.5 13.0 - 17.0 g/dL    Hematocrit 48.9 40.7 - 50.3 %    MCV 92.6 82.6 - 102.9 fL    MCH 31.3 25.2 - 33.5 pg    MCHC 33.7 28.4 - 34.8 g/dL    RDW 13.2 11.8 - 14.4 %    Platelets 115 035 - 829 k/uL    MPV 9.5 8.1 - 13.5 fL    NRBC Automated 0.0 0.0 per 100 WBC    Total  39 - 308 U/L    CK-MB 2.6 <10.5 ng/mL    % CKMB 1.2 0.0 - 3.5 %    CKMB Interpretation NORMAL ISOENZYME PATTERN     Differential Type NOT REPORTED     Seg Neutrophils 58 36 - 65 %    Lymphocytes 33 24 - 43 %    Monocytes 7 3 - 12 %    Eosinophils % 1 1 - 4 %    Basophils 1 0 - 2 %    Immature Granulocytes 0 0 %    Segs Absolute 5.92 1.50 - 8.10 k/uL    Absolute Lymph # 3.27 1.10 - 3.70 k/uL    Absolute Mono # 0.66 0.10 - 1.20 k/uL    Absolute Eos # 0.10 0.00 - 0.44 k/uL    Basophils Absolute 0.09 0.00 - 0.20 k/uL    Absolute Immature Granulocyte <0.03 0.00 - 0.30 k/uL    WBC Morphology NOT REPORTED     RBC Morphology NOT REPORTED     Platelet Estimate NOT REPORTED     Myoglobin 76 (H) 28 - 72 ng/mL    Protime 10.6 9.1 - 12.3 sec    INR 1.0     PTT 23.6 20.5 - 30.5 sec    Troponin, High Sensitivity 9 0 - 22 ng/L    Troponin T NOT REPORTED <0.03 ng/mL    Troponin Interp NOT REPORTED    CBC   Result Value Ref Range    WBC 8.2 3.5 - 11.3 k/uL    RBC 4.87 4.21 - 5.77 m/uL    Hemoglobin 14.9 13.0 - 17.0 g/dL    Hematocrit 45.0 40.7 - 50.3 %    MCV 92.4 82.6 - 102.9 fL    MCH 30.6 25.2 - 33.5 pg    MCHC 33.1 28.4 - 34.8 g/dL    RDW 13.1 11.8 - 14.4 %    Platelets 686 240 - 376 k/uL    MPV 9.8 8.1 - 13.5 fL    NRBC Automated 0.0 0.0 per 100 WBC   Hemoglobin A1c   Result Value Ref Range    Hemoglobin A1C 6.7 (H) 4.0 - 6.0 %    Estimated Avg Glucose 146 mg/dL   Lipid panel - fasting   Result Value Ref Range    Cholesterol 80 <200 mg/dL    HDL 27 (L) >40 mg/dL    LDL Cholesterol 27 0 - 130 mg/dL    Chol/HDL Ratio 3.0 <5    Triglycerides 128 <150 mg/dL    VLDL NOT REPORTED 1 - 30 mg/dL   Basic Metabolic Panel w/ Reflex to MG   Result Value Ref Range    Glucose 149 (H) 70 - 99 mg/dL    BUN 16 6 - 20 mg/dL    CREATININE 0.71 0.70 - 1.20 mg/dL    Bun/Cre Ratio NOT REPORTED 9 - 20    Calcium 9.6 8.6 - 10.4 mg/dL    Sodium 135 135 - 144 mmol/L    Potassium 3.7 3.7 - 5.3 mmol/L    Chloride 100 98 - 107 mmol/L    CO2 22 20 - 31 mmol/L    Anion Gap 13 9 - 17 mmol/L    GFR Non-African American >60 >60 mL/min    GFR African American >60 >60 mL/min    GFR Comment          GFR Staging NOT REPORTED    POC Glucose Fingerstick   Result Value Ref Range    POC Glucose 148 (H) 75 - 110 mg/dL         RADIOLOGY:  CT HEAD WO CONTRAST    Result Date: 1/15/2022  EXAMINATION: CT OF THE HEAD WITHOUT CONTRAST  1/15/2022 10:27 pm TECHNIQUE: CT of the head was performed without the administration of intravenous contrast. Dose modulation, iterative reconstruction, and/or weight based adjustment of the mA/kV was utilized to reduce the radiation dose to as low as reasonably achievable. COMPARISON: Noncontrast head CT dated 10/14/2021. Noncontrast head CT dated 10/15/2021.  HISTORY: ORDERING SYSTEM PROVIDED HISTORY: left-sided weakness, speech changes TECHNOLOGIST PROVIDED HISTORY: left-sided weakness, speech changes Decision Support Exception - unselect if not a suspected or confirmed emergency medical condition->Emergency Medical Condition (MA) Reason for Exam: left-sided weakness, speech changes FINDINGS: BRAIN/VENTRICLES: There is no acute intracranial hemorrhage, mass effect or midline shift. No abnormal extra-axial fluid collection. The gray-white differentiation is maintained without evidence of an acute infarct. There is no evidence of hydrocephalus. Old lacunar infarct is now identified posteriorly in the right basal ganglia where the previously noted subacute infarct was located. There appears to be an additional new tiny old lacunar infarct not seen previously immediately lateral to right caudate head. ORBITS: The visualized portion of the orbits demonstrate no acute abnormality. SINUSES: The visualized paranasal sinuses and mastoid air cells demonstrate no acute abnormality. SOFT TISSUES/SKULL:  No acute abnormality of the visualized skull or soft tissues. No acute intracranial abnormality. CTA HEAD NECK W CONTRAST    Result Date: 1/15/2022  EXAMINATION: CTA OF THE HEAD AND NECK WITH CONTRAST 1/15/2022 10:27 pm: TECHNIQUE: CTA of the head and neck was performed with the administration of intravenous contrast. Multiplanar reformatted images are provided for review. MIP images are provided for review. Stenosis of the internal carotid arteries measured using NASCET criteria. Dose modulation, iterative reconstruction, and/or weight based adjustment of the mA/kV was utilized to reduce the radiation dose to as low as reasonably achievable. COMPARISON: 10/14/2021 HISTORY: ORDERING SYSTEM PROVIDED HISTORY: left-sided weakness, slurring speech TECHNOLOGIST PROVIDED HISTORY: left-sided weakness, slurring speech Decision Support Exception - unselect if not a suspected or confirmed emergency medical condition->Emergency Medical Condition (MA) Reason for Exam: left-sided weakness, speech changes FINDINGS: CTA NECK: AORTIC ARCH/ARCH VESSELS: No dissection or arterial injury. No significant stenosis of the brachiocephalic or subclavian arteries.  CAROTID ARTERIES: No dissection, arterial injury, or hemodynamically significant stenosis by NASCET criteria. VERTEBRAL ARTERIES: No dissection or arterial injury. No significant stenosis on the right. There is chronic mild to moderate focal stenosis at the origin of the left vertebral artery which does not appear significantly changed from prior. SOFT TISSUES: The lung apices are clear. No cervical or superior mediastinal lymphadenopathy. The larynx and pharynx are unremarkable. No acute abnormality of the salivary and thyroid glands. BONES: No acute osseous abnormality. CTA HEAD: ANTERIOR CIRCULATION: At the proximal intracavernous right internal carotid artery, there is borderline hemodynamically significant stenosis presumably due to soft atheromatous plaque. This appears to be new since the previous exam.  Otherwise, no significant stenosis of the intracranial internal carotid, anterior cerebral, or middle cerebral arteries. No aneurysm. POSTERIOR CIRCULATION: No significant stenosis of the vertebral, basilar, or posterior cerebral arteries. No aneurysm. OTHER: No dural venous sinus thrombosis on this non-dedicated study. BRAIN: Preserved for to the report for the dedicated noncontrast head CT. No clearly acute abnormality identified. Mild to moderate focal stenosis at the origin of the left vertebral artery not significantly changed from prior. Borderline hemodynamically significant fusiform stenosis of the proximal intracavernous internal carotid artery on the right. MRI BRAIN WO CONTRAST    Result Date: 1/16/2022  EXAMINATION: MRI OF THE BRAIN WITHOUT CONTRAST,  1/16/2022 10:54 am TECHNIQUE: Multiplanar multisequence MRI of the brain was performed without the administration of intravenous contrast. COMPARISON: MRI brain 10/15/2021 and CT angiogram brain 01/15/2022.  HISTORY: ORDERING SYSTEM PROVIDED HISTORY:  Right MCA stroke TECHNOLOGIST PROVIDED HISTORY: Right MCA stroke Reason for Exam:  Right MCA stroke FINDINGS: INTRACRANIAL STRUCTURES/VENTRICLES:  There is a 1.5 cm ovoid focus of restricted diffusion within the right paracentral r[place] consistent with an acute pontine infarct. There is no acute intracranial hemorrhage. There is no mass effect or midline shift. There is a remote lacunar infarct within the right thalamus. There are multiple foci of abnormal increased T2/FLAIR signal intensity within the periventricular and deep white matter of both hemispheres. There is no sellar or suprasellar mass present. There is no ventriculomegaly or abnormal extra-axial fluid collection present. The proximal portions of the [place] demonstrate normal flow voids. ORBITS: Limited evaluation of the orbits is unremarkable. SINUSES: The paranasal sinuses and mastoid air cells are clear. BONES/SOFT TISSUES: Bone marrow signal intensity is normal.     1. Acute 1.5 cm infarct within the right paracentral r[place]. 2. No acute intracranial hemorrhage. 3. Remote right thalamic lacunar infarct. 4. Mild chronic small vessel ischemic changes. The findings were sent to the Radiology Results [address] at 11:20 am on 1/16/2022 to be communicated to a licensed caregiver.          EKG  Normal sinus rhythm, rate: 87  CT: 142  QRS: 92  QT/QTc: 376/452  No ST elevation or depression  No T wave abnormality    All EKG's are interpreted by the Emergency Department Physician who either signs or Co-signs this chart in the absence of a cardiologist.      MEDICATIONS ORDERED:  Orders Placed This Encounter   Medications    iopamidol (ISOVUE-370) 76 % injection 90 mL         PROCEDURES:  None      CONSULTS:  IP CONSULT TO NEUROCRITICAL CARE  IP CONSULT TO CARDIOLOGY  IP CONSULT TO PHYSICAL MEDICINE REHAB  IP CONSULT TO NEUROLOGY      EMERGENCY DEPARTMENT COURSE:  ED Course as of 01/16/22 1406   Sat Bg 15, 2022   1959 BUN: 17 [KA]   1959 Creatinine: 0.82 [KA]   2015 Stroke team is at bedside evaluating the patient [KA]   2040 Discussed the patient with stroke team who states that if his CT head and CTA head and neck are negative that the patient should still be admitted to the observation unit for MRI of the brain in the morning, as if he had any worsening or additional strokes, they would change his antiplatelet regimen [KA]   2158 There is a delay in patient getting his CT scans due to several stroke alerts and traumas occurring at the same time. [KA]   2257 CT HEAD WO CONTRAST  No acute intracranial abnormality. So [place]   4537 Patient will be signed out to Dr. Catracho Carter pending CTA results. Plan to admit to obs for MRI in the morning, as patient's antiplatelet regimen would change if any new abnormalities present. If CTA abnormal will rediscuss with neurology team.  [facility]      ED Course User Index  [KA] Sara Richard DO          FINAL IMPRESSION      1. Left-sided weakness    2. Stenosis of right internal carotid artery          DISPOSITION / PLAN     DISPOSITION  Signed out      PATIENT REFERRED TO:  No follow-up provider specified.     DISCHARGE MEDICATIONS:  Current Discharge Medication List          Sara Richard DO  Emergency Medicine Resident    (Please note that portions of this note were completed with a voice recognition program.Efforts were made to edit the dictations but occasionally words are mis-transcribed.)        Sara Richard DO  Resident  01/15/22 Clayton Coyle,   Resident  01/16/22 [id number]

## 2022-01-16 NOTE — CONSULTS
Endovascular Neurosurgery Consult      Reason for evaluation: Right ICA cavernous segment stenosis     SUBJECTIVE:   History of Chief Complaint:    Mr. Joaquina Ruiz is a 58-year-old male with past medical history significant for hypertension, diabetes, dyslipidemia, previous right basal ganglia lacunar stroke with residual left-sided weakness presented to [facility] yesterday with worsening left-sided weakness. Reportedly, patient \"overworked\" with physical therapy on Monday and Thrursday , and then had exacerbation of his left arm and leg weakness. CTA revealed right ICA cavernous segment stenosis with new atheromatous plaque. Therefore endovascular team was consulted. Patient was loaded with ASA and Plavix last night. Allergies  is allergic to penicillins. Medications  Prior to Admission medications    Medication Sig Start Date End Date Taking? Authorizing Provider   lisinopril (PRINIVIL;ZESTRIL) 5 MG tablet take 1 tablet by mouth once daily 1/3/22   Historical Provider, MD   aspirin 81 MG EC tablet Take 1 tablet by mouth daily 1/10/22   Chris Oleary MD   atorvastatin (LIPITOR) 80 MG tablet Take 1 tablet by mouth nightly 1/10/22   Chris Oleary MD   metFORMIN (GLUCOPHAGE) 500 MG tablet Take 1 tablet by mouth 2 times daily (with meals) 10/18/21   Georgie Patel MD   albuterol sulfate HFA (VENTOLIN HFA) 108 (90 Base) MCG/ACT inhaler Inhale 2 puffs into the lungs every 6 hours as needed for Wheezing 6/2/21   CHRISTAL Rizvi CNP   loratadine (CLARITIN) 10 MG tablet Take 10 mg by mouth daily.   Patient not taking: Reported on 1/10/2022    Historical Provider, MD    Scheduled Meds:   aspirin  81 mg Oral Daily    atorvastatin  80 mg Oral Nightly    lisinopril  5 mg Oral Daily    sodium chloride flush  5-40 mL IntraVENous 2 times per day    aspirin  324 mg Oral Once    clopidogrel  300 mg Oral Once    enoxaparin  40 mg SubCUTAneous Daily    nicotine  1 patch TransDERmal Daily    famotidine  20 mg Oral BID    insulin lispro  0-6 Units SubCUTAneous TID WC    insulin lispro  0-3 Units SubCUTAneous Nightly    clopidogrel  75 mg Oral Daily     Continuous Infusions:   sodium chloride      sodium chloride      dextrose       PRN Meds:.sodium chloride flush, sodium chloride, LORazepam, ondansetron **OR** ondansetron, polyethylene glycol, labetalol, acetaminophen **OR** acetaminophen, glucose, dextrose, glucagon (rDNA), dextrose  Past Medical History   has a past medical history of Hypertension and Tobacco abuse. Past Surgical History   has no past surgical history on file. Social History   reports that he has been smoking cigarettes. He has a 30.00 pack-year smoking history. He has never used smokeless tobacco.   reports current alcohol use. reports current drug use. Drug: Marijuana Adán Budge). Family History  family history is not on file. Review of Systems:  CONSTITUTIONAL:  negative for fevers, chills, fatigue and malaise    EYES:  negative for double vision, blurred vision and photophobia     HEENT:  negative for tinnitus, epistaxis and sore throat    RESPIRATORY:  negative for cough, shortness of breath, wheezing    CARDIOVASCULAR:  negative for chest pain, palpitations, syncope, edema    GASTROINTESTINAL:  negative for nausea, vomiting    GENITOURINARY:  negative for incontinence    MUSCULOSKELETAL:  negative for neck or back pain    NEUROLOGICAL:  Negative for weakness and tingling  negative for headaches and dizziness    PSYCHIATRIC:  negative for anxiety      Review of systems otherwise negative. OBJECTIVE:     Vitals:    01/16/22 0700   BP: 138/72   Pulse: 71   Resp: 20   Temp:    SpO2: 94%        General:  Gen: normal habitus, NAD  HEENT: NCAT, mucosa moist  Cvs: RRR, S1 S2 normal  Resp: symmetric unlabored breathing  Abd: s/nd/nt  Ext: no edema  Skin: no lesions seen, warm and dry    Neuro:  Gen: awake and alert, oriented x3. Lang/speech: no aphasia or dysarthria. Follows commands. CN: PERRL, EOMI, VFF, V1-3 intact, face symmetric, hearing intact, shoulder shrug symmetric, tongue midline  Motor: Left UE and LE 4/5, right UE and LE 5/5   Sense:reduced sensation to touch in left UE and LE  Coord: impaired on left UE and LE  DTR: deferred  Gait: deferred     NIH Stroke Scale:   1a  Level of consciousness: 0 - alert; keenly responsive   1b. LOC questions:  0 - answers both questions correctly   1c. LOC commands: 0 - performs both tasks correctly   2. Best Gaze: 0 - normal   3. Visual: 0 - no visual loss   4. Facial Palsy: 0 - normal symmetric movement   5a. Motor left arm: 1 - drift, limb holds 90 (or 45) degrees but drifts down before full 10 seconds: does not hit bed   5b. Motor right arm: 0 - no drift, limb holds 90 (or 45) degrees for full 10 seconds   6a. Motor left le - drift; leg falls by the end of the 5 second period but does not hit bed   6b  Motor right le - no drift; leg holds 30 degree position for full 5 seconds   7. Limb Ataxia: 0 - absent   8. Sensory: 1 - mild to moderate sensory loss; patient feels pinprick is less sharp or is dull on the affected side; there is a loss of superficial pain with pinprick but patient is aware of being touched    9. Best Language:  0 - no aphasia, normal   10. Dysarthria: 0 - normal   11. Extinction and Inattention: 0 - no abnormality         Total:   3     MRS: 03      LABS:   Reviewed.   Lab Results   Component Value Date    HGB 14.9 2022    WBC 8.2 2022     2022     2022    BUN 16 2022    CREATININE 0.71 2022    APTT 23.6 01/15/2022    INR 1.0 01/15/2022      Lab Results   Component Value Date    COVID19 Not Detected 2022       RADIOLOGY:   Images were personally reviewed including:   CTA Head and neck 01/15:   No clearly acute abnormality identified.       Mild to moderate focal stenosis at the origin of the left vertebral artery   not significantly changed from prior.       Borderline hemodynamically significant fusiform stenosis of the proximal   intracavernous internal carotid artery on the right. ASSESSMENT:   59 y/o M with past medical history significant for hypertension, diabetes, dyslipidemia, previous right basal ganglia lacunar stroke with residual left-sided weakness presented to [facility] yesterday with worsening left-sided weakness. CTA revealed right ICA cavernous segment stenosis with new atheromatous plaque. On neuro exam slightly worsened left sided weakness   Patient was loaded with ASA and plavix last night. PLAN:   --C/w ASA and Plavix   --C/w Statin   --MRI brain pending   --No plan for DSA or intervention for now  --PT/OT   --Permissive hypertension     Case discussed with Dr. Martinez Cade attending.     Mary Jane Felipe MD    Stroke, [facility]  [id number] [place]  Electronically signed 1/16/2022 at 10:33 AM

## 2022-01-16 NOTE — PROGRESS NOTES
Physical Therapy    Facility/Department: [address]  Initial Assessment    NAME: Cecilia Shepherd  : 1965  MRN: [id number]    Date of Service: 2022    Discharge Recommendations:  Patient would benefit from continued therapy after discharge   PT Equipment Recommendations  Other: Pt does have rolling walker at home    Assessment   Body structures, Functions, Activity limitations: Decreased functional mobility ; Decreased strength;Decreased safe awareness;Decreased endurance;Decreased balance;Decreased posture  Assessment: Pt presents with increaased left side weakneess, pt at baseline does have left side deficts from CVA in Oct 2021. Pt receiving outpt PT/OT. At presetn L UE weakness sems to more prominent. Pt roselyn to perfrom transfer and ambualtion with RW at [facility], pt does have some diffuclty gripping rolling walker on left side. Treatment Diagnosis: Impaired mobility/fucntion. Prognosis: Good  Decision Making: Medium Complexity  PT Education: Goals; General Safety;Gait Training;PT Role;Plan of Care; Functional Mobility Training;Precautions;Transfer Training;Equipment  REQUIRES PT FOLLOW UP: Yes  Activity Tolerance  Activity Tolerance: Patient Tolerated treatment well       Patient Diagnosis(es): The primary encounter diagnosis was Left-sided weakness. A diagnosis of Stenosis of right internal carotid artery was also pertinent to this visit. has a past medical history of Hypertension and Tobacco abuse.   has no past surgical history on file. Restrictions  Restrictions/Precautions  Restrictions/Precautions: General Precautions,Up as Tolerated,Fall Risk  Required Braces or Orthoses?: No  Required Braces or Orthoses  Left Lower Extremity Brace: [employer] (Not in hospital this date)  Position Activity Restriction  Other position/activity restrictions: Pt reports he uses L AFO outside the home, ambulates without L AFO inside home.  SBO goal 120-200  Vision/Hearing  Vision: Impaired  Vision chores since pt's CVA in [facility])  Ambulation Assistance: Independent  Transfer Assistance: Independent  Active : No  Patient's  Info: Dtr drives  Mode of Transportation: [employer]  Occupation: Unemployed  IADL Comments: Reports his bed height at home is low. 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 Per pt, pt has some one at home most of the time between pt's son, daughter and pt's father. Pt's father lives closeby and comes over often. Cognition        Objective          AROM RLE (degrees)  RLE AROM: WFL  AROM LLE (degrees)  LLE General AROM: Hip/knee WFL, decreasd active DF noted-deficts from CVA in Oct 2021. AROM RUE (degrees)  RUE General AROM: See OT eval  AROM LUE (degrees)  LUE General AROM: See OT eval- per pt/family L UE a lot weaker now compared to four days ago. Strength RLE  Strength RLE: WFL  Strength LLE  Comment: Hip flexion 3/5, Knee flexion2+/5, knee extesnion 3-/5, DF 1 to 1+/5, PF 2/5     Sensation  Overall Sensation Status: Impaired  Additional Comments: Decreased light touch sensation L foot/ankle  Bed mobility  Rolling to Left: Stand by assistance  Rolling to Right: Stand by assistance  Supine to Sit: Stand by assistance  Sit to Supine: Stand by assistance  Scooting: Stand by assistance  Comment: transposrter came in at end of session, to take pt to MRI, pt able to transfer to the cart SBA with RW. Transfers  Sit to Stand: Contact guard assistance;Stand by assistance (Pt rocks prior to standing up. )  Stand to sit: Stand by assistance  Bed to Chair: Stand by assistance (RW)  Ambulation  Ambulation?: Yes  Ambulation 1  Surface: level tile  Device: Rolling Walker  Assistance: Stand by assistance;Contact guard assistance  Quality of Gait: Decreased coordiantion noted slightly L LE, at times BNOS, slight increased steppage noted L LE due to L DF weakness, to obtain foot clearance.   Pt initially needs assit for Left , but than roselyn to maintain gripp in rolling walker, although very weak . Per pt, his  strenght is weaker now,  Gait Deviations: Decreased step length  Distance: 40 ft, 10 ft     Balance  Posture: Good  Sitting - Static: Good  Sitting - Dynamic: Fair;+  Standing - Static: Fair;+  Standing - Dynamic: Fair  Comments: Standing with rolling walker. Plan   Plan  Times per week: 6 to 7 x/week  Current Treatment Recommendations: Strengthening,Balance Training,Functional Mobility Training,Transfer Training,Endurance Training,Gait Training,Home Exercise Program,Safety Education & Training,Patient/Caregiver Education & Training,Equipment Evaluation, Education, & procurement  Safety Devices  Type of devices: Gait belt,Call light within reach (Pt being taken to MRI by cart)    G-Code       OutComes Score                                                  AM-PAC Score  AM-PAC Inpatient Mobility Raw Score : 18 (01/16/22 1044)  AM-PAC Inpatient T-Scale Score : 43.63 (01/16/22 1044)  Mobility Inpatient CMS 0-100% Score: 46.58 (01/16/22 1044)  Mobility Inpatient CMS G-Code Modifier : CK (01/16/22 1044)          Goals  Short term goals  Time Frame for Short term goals: 10 visits  Short term goal 1: pt able to perform supine<>sit independently  Short term goal 2: pt able to perform sit to stand SBA for low surfaces  Short term goal 3:  Pt able to ambualte wtih rolling walker distance of 150 ft, SBA, pt abelto safely hold onto eth rolling walker on left side  Short term goal 4: Pt able to go up and down 6\" steps x 4 with R UE support, SBA   Short term goal 5: Pt to improve standing dynamic balance with rolling walker, to fiar+ to reduce fall risk.   Patient Goals   Patient goals : Improve L arm strength, continue outpatient therapy       Therapy Time   Individual Concurrent Group Co-treatment   Time In 1023         Time Out 1046         Minutes 23         Timed Code Treatment Minutes: 10 Minutes Emanuel Reach, PT

## 2022-01-16 NOTE — ED NOTES
Resting quietly, no change in status  Patient and family updated on plan of care  No complaints     Dorathy Shoulder, RN  01/15/22 0083

## 2022-01-16 NOTE — ED NOTES
Patient has a history of CVA October 13, 2021  Patient has noted increased weakness to RUE and increased slurred speech since Tuesday  Patient's PT spoke with his doctor on Thursday and told him to [facility] get checked out\"  Patientn's family was able to get patient to come in finally today  CARLIE and Dr. Mitch Ceballos and Dr. Indu Mcduffie all at bedside          Dorathy Shoulder, [place]  01/15/22 [address] [place] [address]

## 2022-01-16 NOTE — ED PROVIDER NOTES
[facility]  Emergency Department  Emergency Medicine Resident Sign-out     Care of Lenin Cai was assumed from Dr. Melissa Ervin and is being seen for Cerebrovascular Accident (Increased weakness to LUE compared to prior movement from CVA on October 13)  . The patient's initial evaluation and plan have been discussed with the prior provider who initially evaluated the patient. EMERGENCY DEPARTMENT COURSE / MEDICAL DECISION MAKING:       MEDICATIONS GIVEN:  Orders Placed This Encounter   Medications    iopamidol (ISOVUE-370) 76 % injection 90 mL    aspirin EC tablet 81 mg    atorvastatin (LIPITOR) tablet 80 mg    lisinopril (PRINIVIL;ZESTRIL) tablet 5 mg    sodium chloride flush 0.9 % injection 5-40 mL    sodium chloride flush 0.9 % injection 5-40 mL    0.9 % sodium chloride infusion    acetaminophen (TYLENOL) tablet 650 mg    OR Linked Order Group     ondansetron (ZOFRAN-ODT) disintegrating tablet 4 mg     ondansetron (ZOFRAN) injection 4 mg    aspirin chewable tablet 324 mg    clopidogrel (PLAVIX) tablet 300 mg       LABS / RADIOLOGY:     Labs Reviewed   STROKE PANEL - Abnormal; Notable for the following components:       Result Value    Glucose 132 (*)     Anion Gap 19 (*)     Myoglobin 76 (*)     All other components within normal limits       No results found. RECENT VITALS:     Temp: 97 °F (36.1 °C),  Pulse: 69, Resp: 20, BP: 106/66, SpO2: 96 %      This patient is a 60 y.o. Male with complaints of worsening left upper and lower extremity weakness with slurred speech. Of note patient does have history of most recent stroke back in October, he is only on aspirin. His symptoms started about 4 days ago but progressively worsened. Look worse called. Patient to get CT head and CTA head and neck.     Plan: If CT scans negative, observation admit with MRI in the morning      ED Course as of 01/16/22 0114   Sat Bg 15, 2022   1959 BUN: 17 [KA]   1959 Creatinine: 0.82 [KA]   2015 Stroke team is at bedside evaluating the patient [KA]   2040 Discussed the patient with stroke team who states that if his CT head and CTA head and neck are negative that the patient should still be admitted to the observation unit for MRI of the brain in the morning, as if he had any worsening or additional strokes, they would change his antiplatelet regimen [KA]   2158 There is a delay in patient getting his CT scans due to several stroke alerts and traumas occurring at the same time. [KA]   2257 CT HEAD WO CONTRAST  No acute intracranial abnormality. Orion Rogers   2311 Patient will be signed out to Dr. Ramila Porter pending CTA results. Plan to admit to obs for MRI in the morning, as patient's antiplatelet regimen would change if any new abnormalities present. Please see her note for final diagnosis and disposition [KA]   Sun Jan 16, 2022   0113 Patient was initially was to go observation for MRI in the morning. However after CTA head and neck, endovascular recommended further seizures to be done. Patient to be transferred to neuro critical care. I did speak to neuro critical care resident, she will reach out to neurology/stroke resident. Aspirin and Plavix ordered for patient. [AN]      ED Course User Index  [AN] Shena Carlos MD  [KA] Army John, DO       OUTSTANDING TASKS / RECOMMENDATIONS:    1. CT scan of head  2. Admission orders     FINAL IMPRESSION:     1. Left-sided weakness        DISPOSITION:         DISPOSITION:  []  Discharge   []  Transfer -    [x]  Admission -  Neuro critical care   []  Against Medical Advice   []  Eloped   FOLLOW-UP: No follow-up provider specified.    DISCHARGE MEDICATIONS: Current Discharge Medication List             Shena Carlos MD  Emergency Medicine Resident  [facility]        Shena Carlos MD  Resident  01/16/22 7750

## 2022-01-16 NOTE — CONSULTS
Neuro ICU History & Physical    Patient Name: Afsaneh Gillespie  Patient : 1965  Room/Bed: 0350/0350-01  Code Status: Full Code  Allergies: Allergies   Allergen Reactions    Penicillins Rash       CHIEF COMPLAINT     Left Upper Extremity Weakness    HPI    History Obtained From: patient, stroke team, ED team, nursing team     The patient is a 60 y.o. male presented with left upper extremity weakness status post previous stroke in 2021. Patient has past medical history significant for hypertension, left upper extremity and left lower extremity weakness and numbness since last October. Dusty Fernando last Tuesday, 2022, since then patient states that left upper extremity weakness has been worsening, patient was working with physical therapy on Thursday and after physical therapy patient noted that he had significant worsening of left upper extremity weakness. Initial NIH in the ED per Stroke Team: 3, two for left upper extremity drift, one for dysarthria. Initial NIH on my examination similar, 3 total, two for left upper extremity drift, one for dysarthria. CVA in 2021 imaging notable for subacute stroke in the right basal ganglia deficits from previous CVA notable for baseline mild dysarthria, baseline left lower extremity weakness although no drift. Baseline left lower extremity weakness. Patient on aspirin monotherapy, s/p plavix x's 21 days after initial CVA, Lipitor 80 mg. Patient initially admitted to observation unit although upon imaging review per stroke team concerns for borderline hemodynamically significant fusiform stenosis of the proximal intracavernous internal carotid artery on the right. Neuro critical care team was then consulted by stroke team after their discussion with neuro endovascular team.  Neuro endovascular team would like patient admitted to neuro critical care.   Previous resident Dr. Dana Arriaza contacted me for sign out, patient's care had already been discussed with Dr. Tara Rowland [facility] [place] Attending by Dr. Yessica Hutchins. Admitted to ICU From: Observation Unit    Reason for ICU Admission: Imaging concerning for hemodynamically significant fusiform stenosis of the proximal intracavernous internal carotid artery on the right. Patient hemodynamically stable. Neuro exam stable. A+O x's 3. GCS 15        PATIENT HISTORY   Past Medical History:        Diagnosis Date    Hypertension     Tobacco abuse        Past Surgical History:    History reviewed. No pertinent surgical history. Social History:   Social History     Socioeconomic History    Marital status:      Spouse name: Not on file    Number of children: Not on file    Years of education: Not on file    Highest education level: Not on file   Occupational History    Not on file   Tobacco Use    Smoking status: Current Every Day Smoker     Packs/day: 1.00     Years: 30.00     Pack years: 30.00     Types: Cigarettes    Smokeless tobacco: Never Used   Substance and Sexual Activity    Alcohol use: Yes     Comment: rarely    Drug use: Yes     Types: Marijuana Olene Freddy)    Sexual activity: Not on file   Other Topics Concern    Not on file   Social History Narrative    Not on file     Social Determinants of Health     Financial Resource Strain:     Difficulty of Paying Living Expenses: Not on file   Food Insecurity:     Worried About Running Out of Food in the Last Year: Not on file    Franchesca of Food in the Last Year: Not on file   Transportation Needs:     Lack of Transportation (Medical): Not on file    Lack of Transportation (Non-Medical):  Not on file   Physical Activity:     Days of Exercise per Week: Not on file    Minutes of Exercise per Session: Not on file   Stress:     Feeling of Stress : Not on file   Social Connections:     Frequency of Communication with Friends and Family: Not on file    Frequency of Social Gatherings with Friends and Family: Not on file    Attends Roman [employer] Services: Not on file    Active Member of Clubs or Organizations: Not on file    Attends Club or Organization Meetings: Not on file    Marital Status: Not on file   Intimate Partner Violence:     Fear of Current or Ex-Partner: Not on file    Emotionally Abused: Not on file    Physically Abused: Not on file    Sexually Abused: Not on file   Housing Stability:     Unable to Pay for Housing in the Last Year: Not on file    Number of [place] in the Last Year: Not on file    Unstable Housing in the Last Year: Not on file       Family History:   History reviewed. No pertinent family history. Allergies:    Penicillins    Medications Prior to Admission:    Medications Prior to Admission: lisinopril (PRINIVIL;ZESTRIL) 5 MG tablet, take 1 tablet by mouth once daily  aspirin 81 MG EC tablet, Take 1 tablet by mouth daily  atorvastatin (LIPITOR) 80 MG tablet, Take 1 tablet by mouth nightly  metFORMIN (GLUCOPHAGE) 500 MG tablet, Take 1 tablet by mouth 2 times daily (with meals)  albuterol sulfate HFA (VENTOLIN HFA) 108 (90 Base) MCG/ACT inhaler, Inhale 2 puffs into the lungs every 6 hours as needed for Wheezing  loratadine (CLARITIN) 10 MG tablet, Take 10 mg by mouth daily.  (Patient not taking: Reported on 1/10/2022)    Current Medications:  Current Facility-Administered Medications: aspirin EC tablet 81 mg, 81 mg, Oral, Daily  atorvastatin (LIPITOR) tablet 80 mg, 80 mg, Oral, Nightly  lisinopril (PRINIVIL;ZESTRIL) tablet 5 mg, 5 mg, Oral, Daily  sodium chloride flush 0.9 % injection 5-40 mL, 5-40 mL, IntraVENous, 2 times per day  sodium chloride flush 0.9 % injection 5-40 mL, 5-40 mL, IntraVENous, PRN  0.9 % sodium chloride infusion, 25 mL, IntraVENous, PRN  acetaminophen (TYLENOL) tablet 650 mg, 650 mg, Oral, Q4H PRN  ondansetron (ZOFRAN-ODT) disintegrating tablet 4 mg, 4 mg, Oral, Q8H PRN **OR** ondansetron (ZOFRAN) injection 4 mg, 4 mg, IntraVENous, Q6H PRN  aspirin chewable tablet 324 mg, 324 mg, Oral, Once  clopidogrel (PLAVIX) tablet 300 mg, 300 mg, Oral, Once    REVIEW OF SYSTEMS     CONSTITUTIONAL: negative for fatigue   EYES: negative for double vision, blurry vision, and photophobia    HEENT: negative for tinnitus and sore throat   RESPIRATORY: negative for cough, shortness of breath   CARDIOVASCULAR: negative for chest pain, palpitations, or syncope   GASTROINTESTINAL: negative for abdominal pain, nausea, vomiting   GENITOURINARY: negative for incontinence or retention    MUSCULOSKELETAL: negative for neck or back pain, negative for extremity pain   NEUROLOGICAL: Negative for seizures, headaches, numbness, confusion, aphasia, positive for worsening dysarthria, and weakness of left upper extremity    PSYCHIATRIC: negative for agitation, hallucination, SI/HI   SKIN Negative for spontaneous contusions, rashes, or lesions      PHYSICAL EXAM:     /66   Pulse 69   Temp 97 °F (36.1 °C) (Oral)   Resp 20   Wt 200 lb (90.7 kg)   SpO2 96%   BMI 29.11 kg/m²     PHYSICAL EXAM:  CONSTITUTIONAL:  Well developed, well nourished, alert and oriented x 3, in no acute distress. GCS 15. Nontoxic. Mild dysarthria. No aphasia. HEAD:  normocephalic, atraumatic    EYES:  PERRLA, EOMI.  Visual acuity and peripheral vision intact b/l   ENT:  moist mucous membranes   NECK:  supple, symmetric   LUNGS:  Equal air entry bilaterally   CARDIOVASCULAR:  normal s1 / s2, RRR, distal pulses intact   ABDOMEN:  Soft, no rigidity   NEUROLOGIC:  Mental Status:  A & O x3,awake             Cranial Nerves:    II: Visual acuity:  normal  II: Visual fields:  normal  III: Pupils:  equal, round, reactive to light  III,IV,VI: Extra Ocular Movements: intact  V: Facial sensation:  intact  VII: Facial strength: intact  VIII: Hearing:  intact  IX: Palate:  intact  XI: Shoulder shrug:  intact  XII: Tongue movement:  normal    Motor Exam:    Drift:  present - left upper extremity   Tone:  abnormal - decreased, left upper extremity     MOTOR: Left upper extremity severe weakness and drift, otherwise normal extremities  RUE: 5/5  LUE: 2/5  RLE: 5/5  LLE: 4/5    Sensory:    Touch:    Right Upper Extremity:  normal  Left Upper Extremity:  normal  Right Lower Extremity:  normal  Left Lower Extremity:  normal    Deep Tendon Reflexes:    Right Bicep:  2+  Left Bicep:  2+  Right Knee:  2+  Left Knee:  2+    Plantar Response:  Right:  equivocal  Left:  equivocal    Clonus:  N/A    Coordination/Dysmetria:  Heel to Shin:  Right:  normal  Left:  normal  Finger to Nose:   Right:  normal  Left:  normal        NIH Stroke Scale Total (if not done complete detailed one below):    1a.  Level of consciousness:  0 - alert; keenly responsive  1b. Level of consciousness questions:  0 - answers both questions correctly  1c. Level of consciousness questions:  0 - performs both tasks correctly  2. Best Gaze:  0 - normal  3. Visual:  0 - no visual loss  4. Facial Palsy:  0 - normal symmetric movement  5a. Motor left arm:  2 - some effort against gravity, limb cannot get to or maintain (if cued) 90 (or 45) degrees, drifts down to bed, but has some effort against gravity   5b. Motor right arm:  0 - no drift, limb holds 90 (or 45) degrees for full 10 seconds  6a. Motor left le - no drift; leg holds 30 degree position for full 5 seconds  6b. Motor right le - no drift; leg holds 30 degree position for full 5 seconds  7. Limb Ataxia:  0 - absent  8. Sensory:  0 - normal; no sensory loss  9. Best Language:  0 - no aphasia, normal  10. Dysarthria:  1 - mild to moderate, patient slurs at least some words and at worst, can be understood with some difficulty  11.   Extinction and Inattention:  0 - no abnormality    LABS AND IMAGING:     RECENT LABS:  CBC with Differential:    Lab Results   Component Value Date    WBC 10.1 01/15/2022    RBC 5.28 01/15/2022    HGB 16.5 01/15/2022    HCT 48.9 01/15/2022     01/15/2022    MCV 92.6 01/15/2022    MCH 31.3 01/15/2022    MCHC 33.7 01/15/2022    RDW 13.2 01/15/2022    LYMPHOPCT 33 01/15/2022    MONOPCT 7 01/15/2022    BASOPCT 1 01/15/2022    MONOSABS 0.66 01/15/2022    LYMPHSABS 3.27 01/15/2022    EOSABS 0.10 01/15/2022    BASOSABS 0.09 01/15/2022    DIFFTYPE NOT REPORTED 01/15/2022     BMP:    Lab Results   Component Value Date     01/15/2022    K 4.0 01/15/2022    CL 99 01/15/2022    CO2 20 01/15/2022    BUN 17 01/15/2022    CREATININE 0.82 01/15/2022    CALCIUM 9.8 01/15/2022    GFRAA >60 01/15/2022    LABGLOM >60 01/15/2022    GLUCOSE 132 01/15/2022       RADIOLOGY:  CT HEAD WO CONTRAST   Final Result   No acute intracranial abnormality. CTA HEAD NECK W CONTRAST   Final Result   No clearly acute abnormality identified. Mild to moderate focal stenosis at the origin of the left vertebral artery   not significantly changed from prior. Borderline hemodynamically significant fusiform stenosis of the proximal   intracavernous internal carotid artery on the right. Labs and Images will be reviewed with:    [] Gloria Garcia MD    [x] Indu Ricardo MD  [] Nayely Lin MD  --[] there are no new interval images to review. ASSESSMENT AND PLAN:         ASSESSMENT:     This is a 60 y.o. male with acute worsening of known deficits of left upper extremity weakness and dysarthria present since previous stroke in October of 2021. Patient care will be discussed with attending, will reevaluate patient along with attending.      PLAN/MEDICAL DECISION MAKING:    NEUROLOGIC:  - Imaging:    -CT head negative   - CTA: Concerning for borderline hemodynamically significant fusiform stenosis  of the proximal  intracavernosus internal carotid artery on the right.  - Neuro-endovascular team following - pending recommendations   - Goal -200  - Initial NIH 3, two for left upper extremity drift, one for dysarthria.   - Neuro checks Q4 hours per protocol       CARDIOVASCULAR:  BP Range: Systolic (93WKW), LJL:950 , Min:106 , UNZ:468     Diastolic (90UFR), XMC:41, Min:66, Max:104    Pulse Range: Pulse  Av.5  Min: 69  Max: 96  - Goal -200    PULMONARY:  Respiration Range: Resp  Av  Min: 16  Max: 20  Current Pulse Ox: SpO2: 96 %  24HR Pulse Ox Range: SpO2  Av %  Min: 96 %  Max: 100 %  - No supplemental oxygen support at this time     RENAL/FLUID/ELECTROLYTE:  - BUN 17/ Creatinine 0.82  - Monitor electrolytes closely   - I/O: No intake/output data recorded. - IVF: NS @ 75cc/hr     GI/NUTRITION:  NUTRITION:  Diet NPO  - Bowel regimen: MoM, Zofran,   - GI prophylaxis: Pepcid 20 mg BID     ID:  Tmax: Temp (24hrs), Av.8 °F (36.6 °C), Min:97 °F (36.1 °C), Max:98.5 °F (36.9 °C)    Temperature Range: Temp: 97 °F (36.1 °C) Temp  Av.8 °F (36.6 °C)  Min: 97 °F (36.1 °C)  Max: 98.5 °F (36.9 °C)  - WBC   Lab Results   Component Value Date    WBC 10.1 01/15/2022     - Antimicrobials: not indicated  - COVID swab obtained to facilitate possible operative intervention, patient asymptomatic and negative      HEME:   Recent Labs     01/15/22  1907   HGB 16.5      - Platelets 148    ENDOCRINE:  - Continue to monitor blood glucose, goal <180  - glucose controlled - most recent BGL is   Recent Labs     01/15/22  1907   GLUCOSE 132*   - Home Metformin held. - Low Dose Sliding Scale     OTHER:  - PT/OT/ST: ordered - pending recs.  Recs appreciated   - Code Status: Full Code    PROPHYLAXIS:  Stress ulcer: H2 blocker    DVT PROPHYLAXIS:  - Lovenox   - Patient ambulatory       Heri Alvarez DO  Neuro Critical Care Service   2022     2:56 AM

## 2022-01-16 NOTE — H&P
Neuro ICU History & Physical    Patient Name: Pj Guardado  Patient : 1965  Room/Bed: 0350/0350-01  Code Status: Full Code  Allergies: Allergies   Allergen Reactions    Penicillins Rash       CHIEF COMPLAINT     Left Upper Extremity Weakness    HPI    History Obtained From: patient, stroke team, ED team, nursing team     The patient is a 60 y.o. male presented with left upper extremity weakness status post previous stroke in 2021. Patient has past medical history significant for hypertension, left upper extremity and left lower extremity weakness and numbness since last October. Delta Tyson last Tuesday, 2022, since then patient states that left upper extremity weakness has been worsening, patient was working with physical therapy on Thursday and after physical therapy patient noted that he had significant worsening of left upper extremity weakness. Initial NIH in the ED per Stroke Team: 3, two for left upper extremity drift, one for dysarthria. Initial NIH on my examination similar, 3 total, two for left upper extremity drift, one for dysarthria. CVA in 2021 imaging notable for subacute stroke in the right basal ganglia deficits from previous CVA notable for baseline mild dysarthria, baseline left lower extremity weakness although no drift. Baseline left lower extremity weakness. Patient on aspirin monotherapy, s/p plavix x's 21 days after initial CVA, Lipitor 80 mg. Patient initially admitted to observation unit although upon imaging review per stroke team concerns for borderline hemodynamically significant fusiform stenosis of the proximal intracavernous internal carotid artery on the right. Neuro critical care team was then consulted by stroke team after their discussion with neuro endovascular team.  Neuro endovascular team would like patient admitted to neuro critical care.   Previous resident Dr. Kari Ureña contacted me for sign out, patient's care had already been discussed with Dr. Williams Ross, [facility] Attending by Dr. Estrella Tapia. Admitted to ICU From: Observation Unit    Reason for ICU Admission: Imaging concerning for hemodynamically significant fusiform stenosis of the proximal intracavernous internal carotid artery on the right. Patient hemodynamically stable. Neuro exam stable. A+O x's 3. GCS 15        PATIENT HISTORY   Past Medical History:        Diagnosis Date    Hypertension     Tobacco abuse        Past Surgical History:    History reviewed. No pertinent surgical history. Social History:   Social History     Socioeconomic History    Marital status:      Spouse name: Not on file    Number of children: Not on file    Years of education: Not on file    Highest education level: Not on file   Occupational History    Not on file   Tobacco Use    Smoking status: Current Every Day Smoker     Packs/day: 1.00     Years: 30.00     Pack years: 30.00     Types: Cigarettes    Smokeless tobacco: Never Used   Substance and Sexual Activity    Alcohol use: Yes     Comment: rarely    Drug use: Yes     Types: Marijuana Zee Heys)    Sexual activity: Not on file   Other Topics Concern    Not on file   Social History Narrative    Not on file     Social Determinants of Health     Financial Resource Strain:     Difficulty of Paying Living Expenses: Not on file   Food Insecurity:     Worried About Running Out of Food in the Last Year: Not on file    Franchesca of Food in the Last Year: Not on file   Transportation Needs:     Lack of Transportation (Medical): Not on file    Lack of Transportation (Non-Medical):  Not on file   Physical Activity:     Days of Exercise per Week: Not on file    Minutes of Exercise per Session: Not on file   Stress:     Feeling of Stress : Not on file   Social Connections:     Frequency of Communication with Friends and Family: Not on file    Frequency of Social Gatherings with Friends and Family: Not on file    Attends Oral, Once  clopidogrel (PLAVIX) tablet 300 mg, 300 mg, Oral, Once    REVIEW OF SYSTEMS     CONSTITUTIONAL: negative for fatigue   EYES: negative for double vision, blurry vision, and photophobia    HEENT: negative for tinnitus and sore throat   RESPIRATORY: negative for cough, shortness of breath   CARDIOVASCULAR: negative for chest pain, palpitations, or syncope   GASTROINTESTINAL: negative for abdominal pain, nausea, vomiting   GENITOURINARY: negative for incontinence or retention    MUSCULOSKELETAL: negative for neck or back pain, negative for extremity pain   NEUROLOGICAL: Negative for seizures, headaches, numbness, confusion, aphasia, positive for worsening dysarthria, and weakness of left upper extremity    PSYCHIATRIC: negative for agitation, hallucination, SI/HI   SKIN Negative for spontaneous contusions, rashes, or lesions      PHYSICAL EXAM:     /66   Pulse 69   Temp 97 °F (36.1 °C) (Oral)   Resp 20   Wt 200 lb (90.7 kg)   SpO2 96%   BMI 29.11 kg/m²     PHYSICAL EXAM:  CONSTITUTIONAL:  Well developed, well nourished, alert and oriented x 3, in no acute distress. GCS 15. Nontoxic. Mild dysarthria. No aphasia. HEAD:  normocephalic, atraumatic    EYES:  PERRLA, EOMI.  Visual acuity and peripheral vision intact b/l   ENT:  moist mucous membranes   NECK:  supple, symmetric   LUNGS:  Equal air entry bilaterally   CARDIOVASCULAR:  normal s1 / s2, RRR, distal pulses intact   ABDOMEN:  Soft, no rigidity   NEUROLOGIC:  Mental Status:  A & O x3,awake             Cranial Nerves:    II: Visual acuity:  normal  II: Visual fields:  normal  III: Pupils:  equal, round, reactive to light  III,IV,VI: Extra Ocular Movements: intact  V: Facial sensation:  intact  VII: Facial strength: intact  VIII: Hearing:  intact  IX: Palate:  intact  XI: Shoulder shrug:  intact  XII: Tongue movement:  normal    Motor Exam:    Drift:  present - left upper extremity   Tone:  abnormal - decreased, left upper extremity     MOTOR: Left upper extremity severe weakness and drift, otherwise normal extremities  RUE: 5/5  LUE: 2/5  RLE: 5/5  LLE: 4/5    Sensory:    Touch:    Right Upper Extremity:  normal  Left Upper Extremity:  normal  Right Lower Extremity:  normal  Left Lower Extremity:  normal    Deep Tendon Reflexes:    Right Bicep:  2+  Left Bicep:  2+  Right Knee:  2+  Left Knee:  2+    Plantar Response:  Right:  equivocal  Left:  equivocal    Clonus:  N/A    Coordination/Dysmetria:  Heel to Shin:  Right:  normal  Left:  normal  Finger to Nose:   Right:  normal  Left:  normal        NIH Stroke Scale Total (if not done complete detailed one below):    1a.  Level of consciousness:  0 - alert; keenly responsive  1b. Level of consciousness questions:  0 - answers both questions correctly  1c. Level of consciousness questions:  0 - performs both tasks correctly  2. Best Gaze:  0 - normal  3. Visual:  0 - no visual loss  4. Facial Palsy:  0 - normal symmetric movement  5a. Motor left arm:  2 - some effort against gravity, limb cannot get to or maintain (if cued) 90 (or 45) degrees, drifts down to bed, but has some effort against gravity   5b. Motor right arm:  0 - no drift, limb holds 90 (or 45) degrees for full 10 seconds  6a. Motor left le - no drift; leg holds 30 degree position for full 5 seconds  6b. Motor right le - no drift; leg holds 30 degree position for full 5 seconds  7. Limb Ataxia:  0 - absent  8. Sensory:  0 - normal; no sensory loss  9. Best Language:  0 - no aphasia, normal  10. Dysarthria:  1 - mild to moderate, patient slurs at least some words and at worst, can be understood with some difficulty  11.   Extinction and Inattention:  0 - no abnormality    LABS AND IMAGING:     RECENT LABS:  CBC with Differential:    Lab Results   Component Value Date    WBC 10.1 01/15/2022    RBC 5.28 01/15/2022    HGB 16.5 01/15/2022    HCT 48.9 01/15/2022     01/15/2022    MCV 92.6 01/15/2022    MCH 31.3 01/15/2022    MCHC 33.7 01/15/2022    RDW 13.2 01/15/2022    LYMPHOPCT 33 01/15/2022    MONOPCT 7 01/15/2022    BASOPCT 1 01/15/2022    MONOSABS 0.66 01/15/2022    LYMPHSABS 3.27 01/15/2022    EOSABS 0.10 01/15/2022    BASOSABS 0.09 01/15/2022    DIFFTYPE NOT REPORTED 01/15/2022     BMP:    Lab Results   Component Value Date     01/15/2022    K 4.0 01/15/2022    CL 99 01/15/2022    CO2 20 01/15/2022    BUN 17 01/15/2022    CREATININE 0.82 01/15/2022    CALCIUM 9.8 01/15/2022    GFRAA >60 01/15/2022    LABGLOM >60 01/15/2022    GLUCOSE 132 01/15/2022       RADIOLOGY:  CT HEAD WO CONTRAST   Final Result   No acute intracranial abnormality. CTA HEAD NECK W CONTRAST   Final Result   No clearly acute abnormality identified. Mild to moderate focal stenosis at the origin of the left vertebral artery   not significantly changed from prior. Borderline hemodynamically significant fusiform stenosis of the proximal   intracavernous internal carotid artery on the right. Labs and Images will be reviewed with:    [] Gloria Hernandez MD    [x] Jose Roberto Goodwin MD  [] Burt Bauer MD  --[] there are no new interval images to review. ASSESSMENT AND PLAN:         ASSESSMENT:     This is a 60 y.o. male with acute worsening of known deficits of left upper extremity weakness and dysarthria present since previous stroke in October of 2021. Patient care will be discussed with attending, will reevaluate patient along with attending.      PLAN/MEDICAL DECISION MAKING:    NEUROLOGIC:  - Imaging:    -CT head negative   - CTA: Concerning for borderline hemodynamically significant fusiform stenosis  of the proximal  intracavernosus internal carotid artery on the right.  - Neuro-endovascular team following - pending recommendations   - Goal -200  - Initial NIH 3, two for left upper extremity drift, one for dysarthria.   - Neuro checks Q4 hours per protocol       CARDIOVASCULAR:  BP Range:

## 2022-01-16 NOTE — ED NOTES
Emmett Scheuermann speaking with patient  Patient verbalizes part of slurred speech is his new lower dentures  Patient denies pain  Patient placed on cardiac monitor, BP cuff and pulse ox. Alarms set.        Gurjit Cortez RN  01/15/22 1911

## 2022-01-16 NOTE — PROGRESS NOTES
services?: Yes  Family / Caregiver Present: No  Diagnosis: LUE weakness, worsening residual L weakness since CVA in Oct 2021, moderate stenosis in L vert art  Patient Currently in Pain: No  Pain Assessment  Pain Assessment: 0-10  Pain Level: 0    Patient Currently in Pain: No  Oxygen Therapy  SpO2: 95 %  Social/Functional History  Social/Functional History  Lives With: Daughter,Son  Type of Home: House  Home Layout: Two level,Performs ADL's on one level,Able to Live on Main level with bedroom/bathroom  Home Access: Stairs to enter without rails  Entrance Stairs - Number of Steps: 2-3  Bathroom Shower/Tub: Walk-in shower  Bathroom Toilet: Standard  Bathroom Equipment: Built-in shower seat  Bathroom Accessibility: Walker accessible  Home Equipment: Rolling walker,Quad cane (uses quad cane-very short distances only, mainly uses RW)  Receives Help From: Family  ADL Assistance: [facility]: Needs assistance  Homemaking Responsibilities:  (Family does most the home making chores since pt's CVA in [place])  Ambulation Assistance: Independent  Transfer Assistance: Independent  Active : No  Patient's  Info: Dtr drives  Mode of Transportation: [employer]  Occupation: Unemployed  IADL Comments: Reports his bed height at home is low. 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 Per pt, pt has some one at home most of the time between pt's son, daughter and pt's father. Pt's father lives closeby and comes over often.      Objective   Vision: Within Functional Limits  Vision Exceptions: Wears glasses at all times  Hearing: Within functional limits    Orientation  Overall Orientation Status: Within Functional Limits     Balance  Sitting Balance: Modified independent   Standing Balance: Supervision  Standing Balance  Time: 11 min  Activity: Pt stood bedside, sinkside, at toilet, func mob around room  Functional Mobility  Functional - Mobility Device: Rolling Walker  Activity: To/from bathroom  Assist Level: Stand by assistance  Functional Mobility Comments: Personal RW used this date, pt had some difficulty placing L hand on , pt can be impulsive, no major LOB noted  Toilet Transfers  Toilet - Technique: Ambulating  Equipment Used: Grab bars  Toilet Transfer: Minimal assistance  Toilet Transfers Comments: RW used  ADL  Feeding: Modified independent ; Increased time to complete  Grooming: Modified independent ; Increased time to complete  UE Bathing: Supervision; Increased time to complete  LE Bathing: Stand by assistance; Increased time to complete  UE Dressing: Supervision; Increased time to complete  LE Dressing: Contact guard assistance; Increased time to complete  Toileting: Contact guard assistance; Increased time to complete  Additional Comments: Pt demo's a increase in LUE weakness this admission, pt sat on EOB to don B/L personal shoes with setup using figure-4 technique, pt required CGA for doffing/donning pants during toileting activity sitting/standing-successful urination while sitting which was pt idea, pt stood sinkside during oral care tasks utilizing BUE's (LUE with assistance to fully extend arm),  Tone RUE  RUE Tone: Normotonic  Tone LUE  LUE Tone: Hypertonic  LUE Modified Kelvin Scale  LUE Modified Kelvin Scale Completed: No  Coordination  Movements Are Fluid And Coordinated: No  Coordination and Movement description: Fine motor impairments;Gross motor impairments; Left UE     Bed mobility  Supine to Sit: Stand by assistance  Sit to Supine: Unable to assess  Scooting: Supervision  Comment: Pt supine in bed upon arrival, pt sitting in recliner with alarm on upon exit  Transfers  Sit to stand: Stand by assistance  Stand to sit: Supervision  Transfer Comments: Pt required SBA for pt to place own LUE on RW with shoulder abduction or RUE assistance, pt encouraged to reach back for arm rest/bars when sitting in recliner/on toilet-good return     Cognition  Overall Cognitive Status: Exceptions  Safety Judgement: Decreased awareness of need for assistance        Sensation  Overall Sensation Status: WNL (Chronic N/T in LLE compared to RLE)  Additional Comments: Decreased light touch sensation L foot/ankle, good LUE sensation even when pt closed eyes      LUE PROM (degrees)  LUE PROM: Exceptions  L Shoulder Flex  0-180: Taken to 110 degrees this date  L Elbow Flexion 0-145: WFL  L Elbow Extension 145-0: WFL  L Wrist Flex 0-80: WFL  L Wrist Ext 0-70: WFL  LUE AROM (degrees)  LUE AROM : Exceptions  LUE General AROM: Gross weakness in LUE  L Elbow Flexion 0-145: Limited to 50 degrees when arm asssited to full extension  L Elbow Extension 145-0: Poor elbow extension  L Wrist Flexion 0-80: Limited to 15 degrees  L Wrist Extension 0-70: Poor wrist extension noted  Left Hand AROM (degrees)  Left Hand AROM: Exceptions  Left Hand General AROM: Pt able to flex digits without use of tenodesis, however, poor digit active extension  RUE AROM (degrees)  RUE AROM : WFL  LUE Strength  Gross LUE Strength: Exceptions to [facility]  L Shoulder Flex: 1/5  L Elbow Flex: 2-/5  L Elbow Ext: 1/5  L Hand General: 3-/5  RUE Strength  Gross RUE Strength: WFL  R Shoulder Flex: 5/5  R Elbow Flex: 5/5  R Elbow Ext: 5/5  R Hand General: 5/5         Plan   Plan  Times per week: 3-5x    AM-PAC Score        AM-[facility] Inpatient Daily Activity Raw Score: 18 (01/16/22 1440)  AM-PAC Inpatient ADL T-Scale Score : 38.66 (01/16/22 1440)  ADL Inpatient CMS 0-100% Score: 46.65 (01/16/22 1440)  ADL Inpatient CMS G-Code Modifier : CK (01/16/22 1440)    Goals  Short term goals  Time Frame for Short term goals: Pt will by discharge  Short term goal 1: demo ADL UB/LB bathing/dressing activity with mod I and increased time  Short term goal 2: demo good safety awareness during func mob around room using LRD and mod I  Short term goal 3: demo LUE strength of 3+/5 grossly  Short term goal 4: demo standing during func activity for 15 min+ using LRD and mod I  Short term goal 5: demo good safety awareness during entire session with 1 vc only     Therapy Time   Individual Concurrent Group Co-treatment   Time In 0855         Time Out 0931         Minutes 36         Timed Code Treatment Minutes: [address], OTR/L

## 2022-01-16 NOTE — H&P
[facility] Neurology   [address]    HISTORY AND PHYSICAL EXAMINATION            Date:   1/16/2022  Patient name:  Carlos Haq  Date of admission:  1/15/2022  6:49 PM  MRN:   [id number]  Account:  [de-identified]  YOB: 1965  PCP:    No primary care provider on file. Room:   [address] [place] [address]  Code Status:    Full Code    Chief Complaint:     Chief Complaint   Patient presents with    Cerebrovascular Accident     Increased weakness to LUE compared to prior movement from CVA on October 13       History Obtained From:     patient, electronic medical record, medical staff    History of Present Illness: The patient is a 60 y.o. Non- / non  male who presents with Cerebrovascular Accident (Increased weakness to LUE compared to prior movement from CVA on October 13)   and he is admitted to the hospital for the management of worsening left upper and lower extremity weakness and numbness. The patient is a 59 y. o. male with past medical history of hypertension, chronic smoker, who presents with worsening left upper and lower extremity weakness and numbness.   The patient is on aspirin.  Initial NIHSS of 3 by stroke team.  Initial systolic blood pressure was more than 160.  Blood sugar within normal limits. CT head is negative for acute changes. CTA showed Borderline hemodynamically significant fusiform stenosis of the proximal intracavernous internal carotid artery on the right. Loaded with dual antiplatelets. Neuro ICU admitted. MRI of the brain with new right pontine stroke.  He denied double vision, change in speech, facial droop, right-sided weakness, seizures or loss of consciousness, dizziness, headaches, nausea or vomiting, change in bowel or urinary habits.  Neuro exam revealed 3 out of 5 in the left upper distal and proximal, 4+ out of 5 in the left lower proximal and distal, 3 out of 5 in the foot dorsiflexion.   Mild decrease in sensation in the left upper and lower extremity. Hyperreflexia in the left sided extremities. LDL is 27 hemoglobin A1c 6.7.     HPI from 10/14/2021  - CTH WO neg for acute changes   - CTA H/N neg for critical stenosis or LVO              - MRI Brain WO with subacute stroke in the R BG              - ECHO 51% with neg bubble study.              - On Lipiotr 80 mg , LDL 97, HbA1c 7.7              Past Medical History:     Past Medical History:   Diagnosis Date    Hypertension     Tobacco abuse         Past Surgical History:     History reviewed. No pertinent surgical history. Medications Prior to Admission:     Prior to Admission medications    Medication Sig Start Date End Date Taking? Authorizing Provider   lisinopril (PRINIVIL;ZESTRIL) 5 MG tablet take 1 tablet by mouth once daily 1/3/22   Historical Provider, MD   aspirin 81 MG EC tablet Take 1 tablet by mouth daily 1/10/22   Adilene Cee MD   atorvastatin (LIPITOR) 80 MG tablet Take 1 tablet by mouth nightly 1/10/22   Adilene Cee MD   metFORMIN (GLUCOPHAGE) 500 MG tablet Take 1 tablet by mouth 2 times daily (with meals) 10/18/21   Cecy Das MD   albuterol sulfate HFA (VENTOLIN HFA) 108 (90 Base) MCG/ACT inhaler Inhale 2 puffs into the lungs every 6 hours as needed for Wheezing 6/2/21   CHRISTAL Mai - CNP   loratadine (CLARITIN) 10 MG tablet Take 10 mg by mouth daily. Patient not taking: Reported on 1/10/2022    Historical Provider, MD        Allergies:     Penicillins    Social History:     Tobacco:    reports that he has been smoking cigarettes. He has a 30.00 pack-year smoking history. He has never used smokeless tobacco.  Alcohol:      reports current alcohol use. Drug Use:  reports current drug use. Drug: Marijuana Don Safer). Family History:     History reviewed. No pertinent family history.     Review of Systems:     ROS:  Constitutional  Negative for fever and chills    HEENT  Negative for ear discharge, ear pain, nosebleed    Eyes Negative for photophobia, pain and discharge    Respiratory  Negative for hemoptysis and sputum    Cardiovascular  Negative for orthopnea, claudication and PND    Gastrointestinal  Negative for abdominal pain, diarrhea, blood in stool    Musculoskeletal  Negative for joint pain, negative for myalgia    Neurology  worsening left-sided weakness   Skin  Negative for rash or itching    Endo/heme/allergies  Negative for polydipsia, environmental allergy    Psychiatric/behavioral  Negative for suicidal ideation. Patient is not anxious        Physical Exam:   BP (!) 147/101   Pulse 69   Temp 98.5 °F (36.9 °C)   Resp 14   Wt 200 lb (90.7 kg)   SpO2 97%   BMI 29.11 kg/m²   Temp (24hrs), Av.3 °F (36.8 °C), Min:97 °F (36.1 °C), Max:98.7 °F (37.1 °C)    Recent Labs     22  0952   POCGLU 148*     No intake or output data in the 24 hours ending 22 1327      NEUROLOGIC EXAMINATION  GENERAL  Appears comfortable and in no distress   HEENT  NC/ AT   NECK  Supple and no bruits heard   MENTAL STATUS:  Alert, oriented, intact memory, no confusion, mild to moderate dysarthria, normal language, no hallucination or delusion   CRANIAL NERVES: II     -      Visual fields intact to confrontation  III,IV,VI -  EOMs full, no afferent defect, no JOÃO, no ptosis  V     -     Normal facial sensation  VII    -     Normal facial symmetry  VIII   -     Intact hearing  IX,X -     Symmetrical palate  XI    -     Symmetrical shoulder shrug  XII   -     Midline tongue, no atrophy    MOTOR FUNCTION:  Right side 5 out of 5  3 out of 5 in the left upper distal and proximal, 4+ out of 5 in the left lower proximal and distal, 3 out of 5 in the foot dorsiflexion.    SENSORY FUNCTION:  Decree sensation in the left-sided extremities but   CEREBELLAR FUNCTION:  Intact fine motor control over upper limbs   REFLEX FUNCTION:  Hyperreflexia in the left lower extremity, negative Анна and Babinski sign bilaterally   STATION and GAIT  Not tested Investigations:      Laboratory Testing:  Recent Results (from the past 24 hour(s))   STROKE PANEL    Collection Time: 01/15/22  7:07 PM   Result Value Ref Range    Glucose 132 (H) 70 - 99 mg/dL    BUN 17 6 - 20 mg/dL    CREATININE 0.82 0.70 - 1.20 mg/dL    Bun/Cre Ratio NOT REPORTED 9 - 20    Calcium 9.8 8.6 - 10.4 mg/dL    Sodium 138 135 - 144 mmol/L    Potassium 4.0 3.7 - 5.3 mmol/L    Chloride 99 98 - 107 mmol/L    CO2 20 20 - 31 mmol/L    Anion Gap 19 (H) 9 - 17 mmol/L    GFR Non-African American >60 >60 mL/min    GFR African American >60 >60 mL/min    GFR Comment          GFR Staging NOT REPORTED     WBC 10.1 3.5 - 11.3 k/uL    RBC 5.28 4.21 - 5.77 m/uL    Hemoglobin 16.5 13.0 - 17.0 g/dL    Hematocrit 48.9 40.7 - 50.3 %    MCV 92.6 82.6 - 102.9 fL    MCH 31.3 25.2 - 33.5 pg    MCHC 33.7 28.4 - 34.8 g/dL    RDW 13.2 11.8 - 14.4 %    Platelets 525 600 - 522 k/uL    MPV 9.5 8.1 - 13.5 fL    NRBC Automated 0.0 0.0 per 100 WBC    Total  39 - 308 U/L    CK-MB 2.6 <10.5 ng/mL    % CKMB 1.2 0.0 - 3.5 %    CKMB Interpretation NORMAL ISOENZYME PATTERN     Differential Type NOT REPORTED     Seg Neutrophils 58 36 - 65 %    Lymphocytes 33 24 - 43 %    Monocytes 7 3 - 12 %    Eosinophils % 1 1 - 4 %    Basophils 1 0 - 2 %    Immature Granulocytes 0 0 %    Segs Absolute 5.92 1.50 - 8.10 k/uL    Absolute Lymph # 3.27 1.10 - 3.70 k/uL    Absolute Mono # 0.66 0.10 - 1.20 k/uL    Absolute Eos # 0.10 0.00 - 0.44 k/uL    Basophils Absolute 0.09 0.00 - 0.20 k/uL    Absolute Immature Granulocyte <0.03 0.00 - 0.30 k/uL    WBC Morphology NOT REPORTED     RBC Morphology NOT REPORTED     Platelet Estimate NOT REPORTED     Myoglobin 76 (H) 28 - 72 ng/mL    Protime 10.6 9.1 - 12.3 sec    INR 1.0     PTT 23.6 20.5 - 30.5 sec    Troponin, High Sensitivity 9 0 - 22 ng/L    Troponin T NOT REPORTED <0.03 ng/mL    Troponin Interp NOT REPORTED    COVID-19, Rapid    Collection Time: 01/16/22  1:35 AM    Specimen: Nasopharyngeal Swab Result Value Ref Range    Specimen Description . NASOPHARYNGEAL SWAB     SARS-CoV-2, Rapid Not Detected Not Detected   CBC    Collection Time: 01/16/22  4:52 AM   Result Value Ref Range    WBC 8.2 3.5 - 11.3 k/uL    RBC 4.87 4.21 - 5.77 m/uL    Hemoglobin 14.9 13.0 - 17.0 g/dL    Hematocrit 45.0 40.7 - 50.3 %    MCV 92.4 82.6 - 102.9 fL    MCH 30.6 25.2 - 33.5 pg    MCHC 33.1 28.4 - 34.8 g/dL    RDW 13.1 11.8 - 14.4 %    Platelets 826 828 - 957 k/uL    MPV 9.8 8.1 - 13.5 fL    NRBC Automated 0.0 0.0 per 100 WBC   Hemoglobin A1c    Collection Time: 01/16/22  4:52 AM   Result Value Ref Range    Hemoglobin A1C 6.7 (H) 4.0 - 6.0 %    Estimated Avg Glucose 146 mg/dL   Lipid panel - fasting    Collection Time: 01/16/22  4:52 AM   Result Value Ref Range    Cholesterol 80 <200 mg/dL    HDL 27 (L) >40 mg/dL    LDL Cholesterol 27 0 - 130 mg/dL    Chol/HDL Ratio 3.0 <5    Triglycerides 128 <150 mg/dL    VLDL NOT REPORTED 1 - 30 mg/dL   Basic Metabolic Panel w/ Reflex to MG    Collection Time: 01/16/22  4:52 AM   Result Value Ref Range    Glucose 149 (H) 70 - 99 mg/dL    BUN 16 6 - 20 mg/dL    CREATININE 0.71 0.70 - 1.20 mg/dL    Bun/Cre Ratio NOT REPORTED 9 - 20    Calcium 9.6 8.6 - 10.4 mg/dL    Sodium 135 135 - 144 mmol/L    Potassium 3.7 3.7 - 5.3 mmol/L    Chloride 100 98 - 107 mmol/L    CO2 22 20 - 31 mmol/L    Anion Gap 13 9 - 17 mmol/L    GFR Non-African American >60 >60 mL/min    GFR African American >60 >60 mL/min    GFR Comment          GFR Staging NOT REPORTED    POC Glucose Fingerstick    Collection Time: 01/16/22  9:52 AM   Result Value Ref Range    POC Glucose 148 (H) 75 - 110 mg/dL         Assessment :      Primary Problem  Acute right pontine stroke. Active Hospital Problems    Diagnosis Date Noted    Left-sided weakness [R53.1]     Stroke-like episode [R29.90] 01/15/2022       Plan:     MRI with acute left pontine stroke.   CTA with hemodynamic stenosis of the right cavernous ICA, neuro endovascular is

## 2022-01-16 NOTE — ED NOTES
Patient not happy to learn he was being admitted  He thought he would be allowed to go home tonight.      Luann Sanchez RN  01/16/22 0019

## 2022-01-16 NOTE — CARE COORDINATION
Case Management Initial Discharge Plan  Camila Larios,             Met with:patient to discuss discharge plans. Information verified: address, contacts, phone number, , insurance Yes  Insurance Provider: [employer]    Emergency Contact/Next of Kin name & number: son Zaida Laguna [phone]  Who are involved in patient's support system? Son and daughter    PCP: Rosana Keyes  Date of last visit: one monthago      Discharge Planning    Living Arrangements:        Home has 2 stories  3 stairs to climb to get into front door, flight stairs to climb to reach second floor  Location of bedroom/bathroom in home first floor    Patient able to perform ADL's:Independent    Current Services (outpatient & in home) OP therapy  DME equipment: rolling walker, shower seat  DME provider: n.a    Is patient receiving oral anticoagulation therapy? Yes baby ASA    If indicated:   Physician managing anticoagulation treatment:   Where does patient obtain lab work for ATC treatment? Potential Assistance Needed:       Patient agreeable to home care: No  [place] of choice provided:  n/a    Prior SNF/Rehab Placement and Facility: no  Agreeable to SNF/Rehab: No  [place] of choice provided: n/a     Evaluation: n/a    Expected Discharge date:       Patient expects to be discharged to:   Home    If home: is the family and/or caregiver wiling & able to provide support at home? yes  Who will be providing this support? children    Follow Up Appointment: Best Day/ Time:      Transportation provider: bahman  Transportation arrangements needed for discharge: No    Readmission Risk              Risk of Unplanned Readmission:  11             Does patient have a readmission risk score greater than 14?: No  If yes, follow-up appointment must be made within 7 days of discharge.      Goals of Care: safety      Educated patient on transitional options, provided freedom of choice and are agreeable with plan      Discharge Plan: Home

## 2022-01-16 NOTE — CONSULTS
Department of Endovascular Neurosurgery                                         Resident Consult Note    Reason for Consult: Left upper extremity weakness  Endovascular Neurosurgeon:   []Dr. Armani Tijerina  [x]Dr. Mary Gaxiola      History Obtained From:  patient    CHIEF COMPLAINT:       Left upper extremity weakness    HISTORY OF PRESENT ILLNESS:       The patient is a 60 y.o. male who presents with worsening left upper extremity weakness status post previous stroke in October last year, patient has a history of hypertension, chronic smoker, left upper extremity and left lower extremity weakness and numbness since last October, when he presented with initial NIH of two initial blood pressure of 200 MRI at that time showed subacute stroke in the right basal ganglia, echo 51% with negative bubble study, patient loaded with aspirin and Plavix and discharged on aspirin and Plavix for 21 days then aspirin monotherapy, he is on Lipitor 80 mg  Patient last seen in the office as a telehealth with Dr. Gavin Mckeon on 1/10/2022 patient was oriented x4 and he stated that he is improving with left-sided weakness, aspirin 81 Lipitor 80 mg,    -However since Tuesday patient was getting worse on the left upper extremity weakness, patient was overworked and physical therapy on Tuesday after that he had significant worsening of left upper extremity weakness, patient has baseline mild dysarthria, baseline left lower extremity weakness no drift,    LKW: Last Tuesday, 1/11/2022  -NIH of three, two for left upper extremity drift, one for dysarthria         PAST MEDICAL HISTORY :       Past Medical History:        Diagnosis Date    Hypertension     Tobacco abuse        Past Surgical History:    History reviewed. No pertinent surgical history.     Social History:   Social History     Socioeconomic History    Marital status:      Spouse name: Not on file    Number of children: Not on file    Years of education: Not on file    Highest education level: Not on file   Occupational History    Not on file   Tobacco Use    Smoking status: Current Every Day Smoker     Packs/day: 1.00     Years: 30.00     Pack years: 30.00     Types: Cigarettes    Smokeless tobacco: Never Used   Substance and Sexual Activity    Alcohol use: Yes     Comment: rarely    Drug use: Yes     Types: Marijuana Desi Peals)    Sexual activity: Not on file   Other Topics Concern    Not on file   Social History Narrative    Not on file     Social Determinants of Health     Financial Resource Strain:     Difficulty of Paying Living Expenses: Not on file   Food Insecurity:     Worried About Running Out of Food in the Last Year: Not on file    [place] of Food in the Last Year: Not on file   Transportation Needs:     Lack of Transportation (Medical): Not on file    Lack of Transportation (Non-Medical): Not on file   Physical Activity:     Days of Exercise per Week: Not on file    Minutes of Exercise per Session: Not on file   Stress:     Feeling of Stress : Not on file   Social Connections:     Frequency of Communication with Friends and Family: Not on file    Frequency of Social Gatherings with Friends and Family: Not on file    Attends Taoism Services: Not on file    Active Member of [address] [place] [address] Sent[employer] or Organizations: Not on file    Attends Club or Organization Meetings: Not on file    Marital Status: Not on file   Intimate Partner Violence:     Fear of Current or Ex-Partner: Not on file    Emotionally Abused: Not on file    Physically Abused: Not on file    Sexually Abused: Not on file   Housing Stability:     Unable to Pay for Housing in the Last Year: Not on file    Number of [place] in the Last Year: Not on file    Unstable Housing in the Last Year: Not on file       Family History:   History reviewed. No pertinent family history. Allergies:  Penicillins    Home Medications:  Prior to Admission medications    Medication Sig Start Date End Date Taking? Authorizing Provider   lisinopril (PRINIVIL;ZESTRIL) 5 MG tablet take 1 tablet by mouth once daily 1/3/22   Historical Provider, MD   aspirin 81 MG EC tablet Take 1 tablet by mouth daily 1/10/22   Sergey Allen MD   atorvastatin (LIPITOR) 80 MG tablet Take 1 tablet by mouth nightly 1/10/22   Sergey Allen MD   metFORMIN (GLUCOPHAGE) 500 MG tablet Take 1 tablet by mouth 2 times daily (with meals) 10/18/21   Nichol Medina MD   albuterol sulfate HFA (VENTOLIN HFA) 108 (90 Base) MCG/ACT inhaler Inhale 2 puffs into the lungs every 6 hours as needed for Wheezing 6/2/21   Lonza Innocent, APRN - CNP   loratadine (CLARITIN) 10 MG tablet Take 10 mg by mouth daily. Patient not taking: Reported on 1/10/2022    Historical Provider, MD       Current Medications:   No current facility-administered medications for this encounter. REVIEW OF SYSTEMS:       CONSTITUTIONAL: negative for fatigue and malaise   EYES: negative for double vision and photophobia    HEENT: negative for tinnitus and sore throat   RESPIRATORY: negative for cough, shortness of breath   CARDIOVASCULAR: negative for chest pain, palpitations   GASTROINTESTINAL: negative for nausea, vomiting   GENITOURINARY: negative for incontinence   MUSCULOSKELETAL: negative for neck or back pain   NEUROLOGICAL: negative for seizures   PSYCHIATRIC: negative for fatigue     Review of systems otherwise negative. PHYSICAL EXAM:       BP (!) 159/104   Pulse 96   Temp 98.5 °F (36.9 °C)   Resp 16   Wt 200 lb (90.7 kg)   SpO2 100%   BMI 29.11 kg/m²     CONSTITUTIONAL:  Well developed, well nourished, alert and oriented x 3, in no acute distress. GCS 15, nontoxic. Mild dysarthria, no aphasia. EOMI.     HEAD:  normocephalic, atraumatic    EYES:  PERRLA, EOMI.   ENT:  moist mucous membranes   NECK:  supple, symmetric, no midline tenderness to palpation    BACK:  without midline tenderness, step-offs or deformities    LUNGS:  Equal air entry bilaterally CARDIOVASCULAR:  normal s1 / s2   ABDOMEN:  Soft, no rigidity   NEUROLOGIC:  Mental Status:  A & O x3,awake             Cranial Nerves:    cranial nerves II-XII are grossly intact    Motor Exam:    -Left upper extremity severe weakness and drift, otherwise normal extremities  -Left upper extremity, 2/5,  -Left lower extremity 4+/5  -Right side 5/5    Sensory:    Touch:    Normal sensation all four extremities and face. Plantar Response:  Right:  equivocal  Left:  equivocal    Clonus:  absent  Fairchild's:  absent    Coordination/Dysmetria:  Normal coordination on the right side, unable to assess left upper extremity due to weakness    Gait: Not assessed, patient mentioned that he came in walking    [address]:    1a.  Level of consciousness:  0 - alert; keenly responsive  1b. Level of consciousness questions:  0 - answers both questions correctly  1c. Level of consciousness questions:  0 - performs both tasks correctly  2. Best Gaze:  0 - normal  3. Visual:  0 - no visual loss  4. Facial Palsy:  0 - normal symmetric movement  5a. Motor left arm:  2 - some effort against gravity, limb cannot get to or maintain (if cued) 90 (or 45) degrees, drifts down to bed, but has some effort against gravity   5b. Motor right arm:  0 - no drift, limb holds 90 (or 45) degrees for full 10 seconds  6a. Motor left le - no drift; leg holds 30 degree position for full 5 seconds  6b. Motor right le - no drift; leg holds 30 degree position for full 5 seconds  7. Limb Ataxia:  0 - absent  8. Sensory:  0 - normal; no sensory loss  9. Best Language:  0 - no aphasia, normal  10. Dysarthria:  1 - mild to moderate, patient slurs at least some words and at worst, can be understood with some difficulty  11.   Extinction and Inattention:  0 - no abnormality    TOTAL:  3     SKIN:  no rash      LABS AND IMAGING:     CBC with Differential:    Lab Results   Component Value Date    WBC 10.1 01/15/2022 RBC 5.28 01/15/2022    HGB 16.5 01/15/2022    HCT 48.9 01/15/2022     01/15/2022    MCV 92.6 01/15/2022    MCH 31.3 01/15/2022    MCHC 33.7 01/15/2022    RDW 13.2 01/15/2022    LYMPHOPCT 33 01/15/2022    MONOPCT 7 01/15/2022    BASOPCT 1 01/15/2022    MONOSABS 0.66 01/15/2022    LYMPHSABS 3.27 01/15/2022    EOSABS 0.10 01/15/2022    BASOSABS 0.09 01/15/2022    DIFFTYPE NOT REPORTED 01/15/2022     BMP:    Lab Results   Component Value Date     01/15/2022    K 4.0 01/15/2022    CL 99 01/15/2022    CO2 20 01/15/2022    BUN 17 01/15/2022    CREATININE 0.82 01/15/2022    CALCIUM 9.8 01/15/2022    GFRAA >60 01/15/2022    LABGLOM >60 01/15/2022    GLUCOSE 132 01/15/2022           ASSESSMENT AND PLAN:       Patient Active Problem List   Diagnosis    Tobacco abuse    Stroke-like symptom    C[facility]brovascular accident [facility]CVA) [facility])       47-year-old male came in with worsening of left upper extremity weakness after physical therapy on Tuesday, last known well last Tuesday, patient last seen in the clinic with Dr. Zoe Pineda on 1/10/2022, patient has a history of right basal ganglia stroke in October last year, residual symptoms of left upper extremity weakness left lower extremity weakness, dysarthria, mild facial droop, patient was getting better till last Tuesday when he overworked with physical therapy, patient came in concerned about increased weakness in left upper extremity, patient on aspirin 81 mg and Lipitor 80 mg,  -LK W: 1/11/2022, NIH of 3,  -CT head: Unremarkable CTA:  Mild to moderate focal stenosis at the origin of the left vertebral artery not significantly changed from prior.   Borderline hemodynamically significant fusiform stenosis of the proximal intracavernous internal carotid artery on the right.      -Case discussed with , most likely recrudescence stroke, case also discussed with Dr. Deepak Pro, patient will be admitted to neuro ICU, will be loaded on dual antiplatelet therapy and maintenance dose after that, patient might need stenting tomorrow, further recommendation may follow  Meanwhile keep blood pressure between 120 and 200  Additional recommendations may follow    Please contact [facility] with any changes in patients neurologic status.            Rosa Wang MD   1/15/2022  7:57 PM

## 2022-01-16 NOTE — ED NOTES
Report given to [facility] RN, all questions answered  No change in patient status  Continues to rest quietly       Marie Bertrand RN  01/16/22 0235

## 2022-01-16 NOTE — PROGRESS NOTES
Speech Language Pathology  Facility/Department: [address]  Initial Speech/Language/Cognitive Assessment    NAME: Ceferino Aceves  : 1965   MRN: [id number]  ADMISSION DATE: 1/15/2022  ADMITTING DIAGNOSIS: has Tobacco abuse; Stroke-like symptom; Cerebrovascular accident (CVA) ([facility] [address] and Stroke-like episode on their problem list.    Date of Eval: 2022   Evaluating Therapist: ABHISHEK Bolaños    RECENT RESULTS  CT OF HEAD/MRI: 22    Impression   1. Acute 1.5 cm infarct within the right paracentral [place]. 2. No acute intracranial hemorrhage. 3. Remote right thalamic lacunar infarct. 4. Mild chronic small vessel ischemic changes. Primary Complaint:   The patient is a 60 y.o. male presented with left upper extremity weakness status post previous stroke in 2021. Patient has past medical history significant for hypertension, left upper extremity and left lower extremity weakness and numbness since last October. Rajesh Loera last Tuesday, 2022, since then patient states that left upper extremity weakness has been worsening, patient was working with physical therapy on Thursday and after physical therapy patient noted that he had significant worsening of left upper extremity weakness.     Initial NIH in the ED per Stroke Team: 3, two for left upper extremity drift, one for dysarthria.      Initial NIH on my examination similar, 3 total, two for left upper extremity drift, one for dysarthria.      CVA in 2021 imaging notable for subacute stroke in the right basal ganglia deficits from previous CVA notable for baseline mild dysarthria, baseline left lower extremity weakness although no drift.   Baseline left lower extremity weakness.      Patient on aspirin monotherapy, s/p plavix x's 21 days after initial CVA, Lipitor 80 mg.      Patient initially admitted to observation unit although upon imaging review per stroke team concerns for borderline hemodynamically significant fusiform stenosis of the proximal intracavernous internal carotid artery on the right.      Neuro critical care team was then consulted by stroke team after their discussion with neuro endovascular team.  Neuro endovascular team would like patient admitted to neuro critical care. Previous resident Dr. Francisco Portillo contacted me for sign out, patient's care had already been discussed with Dr. Lorrie Barron, [facility] Attending by Dr. Francisco Portillo.      Assessment:  Pt presents with no apparent cognitive deficits at this time. Pt w/ slight-mild dysarthria characterized by articulatory imprecision, hyponasality and slowed rate, however intelligibility 100% t/o evaluation. Pt reports new lower plate of dentures which may contribute to articulatory imprecision. However, pt reports speech is not at baseline. ST to follow up and address noted deficits. Verbal education provided. Recommendations:  Requires SLP Intervention: Yes  Duration/Frequency of Treatment: 2-3x per week  D/C Recommendations: Further therapy recommended at discharge. Plan:   Goals:  Short-term Goals  Goal 1: Pt will utilize dysarthria compensatory strategies to facilitate speech clarity in 4/5 opportunities   Patient/family involved in developing goals and treatment plan: yes    Subjective:   Previous level of function and limitations:   General  Chart Reviewed: Yes  Family / Caregiver Present: Yes     Vision  Vision: Within Functional Limits  Hearing  Hearing: Within functional limits     Objective:  Oral/Motor  Oral Motor:  Within functional limits     Expression  Primary Mode of Expression: Verbal     Motor Speech  Motor Speech: Exceptions to WFL  Dysarthria : Mild (slight-mild, however intelligibility judged at 100%)    Cognition:   Orientation  Overall Orientation Status: Within Normal Limits  Memory  Memory: Within Funtional Limits  Problem Solving  Problem Solving: Within Functional Limits  Abstract Reasoning  Abstract Reasoning: Within Functional Limits  Safety/Judgement  Safety/Judgement: Within Functional Limits   Word Associations: WFL  Verbal Sequencing:WFL    Prognosis:  Speech Therapy Prognosis  Prognosis: Good  Individuals consulted  Consulted and agree with results and recommendations: Patient; Family member    Education:  Patient Education: yes  Patient Education Response: Verbalizes understanding          Therapy Time:   Individual Concurrent Group Co-treatment   Time In 5415         Time Out 1146         Minutes ABHISHEK Bell  1/16/2022 11:54 AM

## 2022-01-17 ENCOUNTER — APPOINTMENT (OUTPATIENT)
Dept: CARDIAC CATH/INVASIVE PROCEDURES | Age: 57
DRG: 041 | End: 2022-01-17
Payer: COMMERCIAL

## 2022-01-17 VITALS
OXYGEN SATURATION: 95 % | SYSTOLIC BLOOD PRESSURE: 135 MMHG | HEIGHT: 70 IN | HEART RATE: 92 BPM | DIASTOLIC BLOOD PRESSURE: 91 MMHG | RESPIRATION RATE: 21 BRPM | TEMPERATURE: 98 F | BODY MASS INDEX: 28.63 KG/M2 | WEIGHT: 200 LBS

## 2022-01-17 PROBLEM — I63.50 RIGHT PONTINE STROKE (HCC): Status: ACTIVE | Noted: 2022-01-17

## 2022-01-17 PROBLEM — I63.9 STROKE DETERMINED BY CLINICAL ASSESSMENT (HCC): Status: ACTIVE | Noted: 2022-01-17

## 2022-01-17 PROBLEM — I63.9 BRAINSTEM INFARCTION (HCC): Status: ACTIVE | Noted: 2022-01-17

## 2022-01-17 PROBLEM — Z86.73 HISTORY OF CEREBRAL INFARCTION: Status: ACTIVE | Noted: 2022-01-17

## 2022-01-17 LAB
EKG ATRIAL RATE: 87 BPM
EKG P AXIS: 32 DEGREES
EKG P-R INTERVAL: 142 MS
EKG Q-T INTERVAL: 376 MS
EKG QRS DURATION: 92 MS
EKG QTC CALCULATION (BAZETT): 452 MS
EKG R AXIS: 72 DEGREES
EKG T AXIS: 41 DEGREES
EKG VENTRICULAR RATE: 87 BPM
GLUCOSE BLD-MCNC: 155 MG/DL (ref 75–110)
GLUCOSE BLD-MCNC: 158 MG/DL (ref 75–110)
HOMOCYSTEINE: 17.7 UMOL/L
LV EF: 55 %
LVEF MODALITY: NORMAL

## 2022-01-17 PROCEDURE — C1764 EVENT RECORDER, CARDIAC: HCPCS

## 2022-01-17 PROCEDURE — 99233 SBSQ HOSP IP/OBS HIGH 50: CPT | Performed by: PSYCHIATRY & NEUROLOGY

## 2022-01-17 PROCEDURE — 85305 CLOT INHIBIT PROT S TOTAL: CPT

## 2022-01-17 PROCEDURE — 82947 ASSAY GLUCOSE BLOOD QUANT: CPT

## 2022-01-17 PROCEDURE — 85300 ANTITHROMBIN III ACTIVITY: CPT

## 2022-01-17 PROCEDURE — 36415 COLL VENOUS BLD VENIPUNCTURE: CPT

## 2022-01-17 PROCEDURE — 85610 PROTHROMBIN TIME: CPT

## 2022-01-17 PROCEDURE — 83090 ASSAY OF HOMOCYSTEINE: CPT

## 2022-01-17 PROCEDURE — 93325 DOPPLER ECHO COLOR FLOW MAPG: CPT

## 2022-01-17 PROCEDURE — 0JH602Z INSERTION OF MONITORING DEVICE INTO CHEST SUBCUTANEOUS TISSUE AND FASCIA, OPEN APPROACH: ICD-10-PCS | Performed by: INTERNAL MEDICINE

## 2022-01-17 PROCEDURE — 85306 CLOT INHIBIT PROT S FREE: CPT

## 2022-01-17 PROCEDURE — 85730 THROMBOPLASTIN TIME PARTIAL: CPT

## 2022-01-17 PROCEDURE — G0378 HOSPITAL OBSERVATION PER HR: HCPCS

## 2022-01-17 PROCEDURE — 85302 CLOT INHIBIT PROT C ANTIGEN: CPT

## 2022-01-17 PROCEDURE — 85303 CLOT INHIBIT PROT C ACTIVITY: CPT

## 2022-01-17 PROCEDURE — 93312 ECHO TRANSESOPHAGEAL: CPT

## 2022-01-17 PROCEDURE — 1200000000 HC SEMI PRIVATE

## 2022-01-17 PROCEDURE — 2709999900 HC NON-CHARGEABLE SUPPLY

## 2022-01-17 PROCEDURE — 82232 ASSAY OF BETA-2 PROTEIN: CPT

## 2022-01-17 PROCEDURE — 6360000002 HC RX W HCPCS

## 2022-01-17 PROCEDURE — 99253 IP/OBS CNSLTJ NEW/EST LOW 45: CPT | Performed by: STUDENT IN AN ORGANIZED HEALTH CARE EDUCATION/TRAINING PROGRAM

## 2022-01-17 PROCEDURE — 86147 CARDIOLIPIN ANTIBODY EA IG: CPT

## 2022-01-17 PROCEDURE — 93010 ELECTROCARDIOGRAM REPORT: CPT | Performed by: INTERNAL MEDICINE

## 2022-01-17 PROCEDURE — 85613 RUSSELL VIPER VENOM DILUTED: CPT

## 2022-01-17 PROCEDURE — 2500000003 HC RX 250 WO HCPCS

## 2022-01-17 PROCEDURE — 33285 INSJ SUBQ CAR RHYTHM MNTR: CPT

## 2022-01-17 PROCEDURE — 92507 TX SP LANG VOICE COMM INDIV: CPT

## 2022-01-17 RX ORDER — CLOPIDOGREL BISULFATE 75 MG/1
75 TABLET ORAL DAILY
Qty: 30 TABLET | Refills: 3 | Status: SHIPPED | OUTPATIENT
Start: 2022-01-18 | End: 2022-02-21 | Stop reason: SDUPTHER

## 2022-01-17 RX ORDER — SODIUM CHLORIDE 9 MG/ML
25 INJECTION, SOLUTION INTRAVENOUS PRN
Status: CANCELLED | OUTPATIENT
Start: 2022-01-17

## 2022-01-17 RX ORDER — SODIUM CHLORIDE 0.9 % (FLUSH) 0.9 %
5-40 SYRINGE (ML) INJECTION PRN
Status: CANCELLED | OUTPATIENT
Start: 2022-01-17

## 2022-01-17 RX ORDER — SODIUM CHLORIDE 0.9 % (FLUSH) 0.9 %
5-40 SYRINGE (ML) INJECTION EVERY 12 HOURS SCHEDULED
Status: CANCELLED | OUTPATIENT
Start: 2022-01-17

## 2022-01-17 ASSESSMENT — PAIN SCALES - GENERAL
PAINLEVEL_OUTOF10: 0
PAINLEVEL_OUTOF10: 0

## 2022-01-17 NOTE — CARE COORDINATION
PHQ-9  Pt present with CVA  Met with pt this date to assess for support and needs  Son at bedside and consent given to complete assessment in his presence. Pt states his son,dtr and 31/2 year old grand daughter lives with him. Pt states he gets emotional sometimes,but not depressed. Denies any suicidal ideations. Pt states his appetite has been up/down also. Pt states his family is very supportive. Patient Health Questionnaire-9 (PHQ-9)    Over the last 2 weeks, how often have you been bothered by any of the following problems? 1. Little Interest or pleasure in doing things? [] Not at all  [] Several Days  [] More than half the day  []  Nearly every day    2. Feeling down, depressed or hopeless? [x] Not at all  [] Several Days  [] More than half the day  []  Nearly every day    3. Trouble falling or staying asleep, or sleeping too much? [x] Not at all  [] Several Days  [] More than half the day  []  Nearly every day    4. Feeling tired or having little energy? [x] Not at all  [] Several Days  [] More than half the day  []  Nearly every day    5. Poor apettite or overeating? [x] Not at all  [x] Several Days  [] More than half the day  []  Nearly every day    6. Feeling bad about yourself-or that you are a failure or have let yourself or your family down? [x] Not at all  [] Several Days  [] More than half the day  []  Nearly every day    7. Trouble concentrating on things, such as reading the newspaper or watching television? [x] Not at all  [] Several Days  [] More than half the day  []  Nearly every day    8. Moving or speaking so slowly that other people could have noticed? Or the opposite-being so fidgety or restless that you have been moving around a lot more than usual?   [x] Not at all  [] Several Days  [] More than half the day  []  Nearly every day    9. Thoughts that you would be better off dead or of hurting yourself in some way?    [x] Not at all  [] Several Days  [] More than half the day  []  Nearly every day    Total Score: 1    If you checked off any problems, how difficult have these problems made it for you to do your work, take care of things at home, or get along with other people?    [x] Not difficult at all  [] Somewhat Difficult  [] Very Difficult  []  Extremely Difficult

## 2022-01-17 NOTE — OP NOTE
[facility]      Procedure Note  LOOP RECORDER IMPLANT      Brooke Jacobs (70 y.o., male)  1965 1/17/2022      Procedure: Loop Recorder Implant    Operators:  Primary: Dr. Nayana Sheffield # Serial #   Recorder [id number]       Sedation monitoring  Loop recorder Implant      Indication: CVA    Procedure  After the usual preparation of the left neck and chest, the patient was draped in the usual sterile manner. Local anesthesia was infused below the left clavicle from the midline laterally. An incision was made below the left breast, lateral to midline. The incision was dilated. The Loop recorder (Reveal) System was advanced using the standard techniques, and positioned successfully with no bleeding or compliaction      Impression / Device:  Successful Implantation of:  Loop Recorder      Plan:  Telemetry monitoring  Interrogate recorder before discharge  Wound [facility] at [facility] in 7 days          Electronically signed on 01/17/22 at 10:34 AM by:    Tamiko Christianson MD, MD   Fellow, [address]       I have reviewed the case / procedure with resident / fellow  I have examined the patient personally  Patient agree with treatment plan as discussed before, final arrangement based on my evaluation and exam.    Risk and benefit of procedure planned were explained in details. Procedure was performed by me personally, with all aspect of the procedure being done using standard protocol. Note was modified based on my own assessment and treatment.     Kip Gutierrez MD  [facility]

## 2022-01-17 NOTE — PROCEDURES
[facility]  Transesophageal Echocardiogram       Today's Date:  1/17/2022  Indication:   CVA    Operators:  Primary:   Dr. So Xiong (Attending Physician)        Pre Procedure Conscious Sedation Data:    ASA Class:    [] I [x] II [] III [] IV    Mallampati Class:  [] I [x] II [] III [] IV    Procedure:    Patient seen and examined. History and Physical reviewed. Labs reviewed. After informed consent was obtained with explanation of the risks and benefits, the patient was brought to cardiac cath lab. All sedation was administered by the cardiologist. The oropharynx was pre-anesthesized with viscous lidocaine and cetacaine spray. The ultrasound probe was passed without any difficulty. LATA findings:    LA:  Normal  ADRIENNE:  No thrombus  RA:  Normal  RV:   Normal  LV:  Normal  Estimated LVEF:  55%  Aorta:   Mild atheromatous disease arch  Pericardium: No pericardial effusion  Septum:  No intracardiac shunt via color Doppler. Valves:    Mitral Valve: Structurally normal. Mild-Moderate regurgitation is identified. Aortic Valve: The aortic valve is trileaflet and opens adequately. Mild regurgitiation is identified. Tricuspid valve: Structurally normal. No significant regurgitation is identified. Pulmonary valve: Normal. No significant regurgitation    No valvular vegetations or thrombus identified. Summary:     1. A LATA was performed without complications. 2. LVEF 55%  3. Mild-moderate MR  4. Mild AI  5. No thrombus or valvular vegetation identified  6. There were no complications encountered. 7. Proceed with loop placement.       Electronically signed by Kari Gonsalez MD on 1/17/2022 at 10:32 AM  Cardiovascular Diseases Fellow  [address] [facility]      I have reviewed the case / procedure with resident / fellow  I have examined the patient personally  Patient agree with treatment plan as discussed before, final arrangement based on my evaluation and exam.    Risk and benefit of procedure planned were explained in details. Procedure was performed by me personally, with all aspect of the procedure being done using standard protocol. Note was modified based on my own assessment and treatment.     Felix Courser, MD  [facility]

## 2022-01-17 NOTE — DISCHARGE SUMMARY
Neurology Discharge Summary     Patient ID: Annia Segura  :  1965   MRN: [id number]     ACCOUNT:  [de-identified]   Patient's PCP: No primary care provider on file. Admit Date: 1/15/2022   Discharge Date: 2022   Length of Stay: 0  Code Status:  Full Code  Admitting Physician: Reyna Thompson MD  Discharge Physician: Radha Landon MD     Active Discharge Diagnoses:       Primary Problem  <principal problem not specified>      Hospital Problems  Active Hospital Problems    Diagnosis Date Noted    Brainstem infarction [facility]) [I63.9] 2022    History of cerebral infarction [Z86.73] 2022    Left-sided weakness [R53.1]     Stroke-like episode [R29.90] 01/15/2022       Condition on the discharge: good, stable, awake, alert oriented with left Upper and lower Ext deficits 3/5. Hospital Stay:       Hospital Course: Annia Segura is a 60 y.o. male with PMH of HTN, DM, HLD, smoking, previous R BG lacunar infarct with residula L sided weakness in 2021 on ASA prior to presentation. Presented with worsening of L upper and lower Ext weakness and numbness. NIHSS 3. SBP > 160. BG was normal. CTH showed old right BG lacunar infarct. CTA borderline hemodynamically significant fusiform stenosis of the prox R intracavernous ICA. He was loaded with DAPT and admitted to NICU based on Endovascular recommendations. MRI B showed new R pontine stroke. LDL 27, A1c 6.7. He was recommended to be admitted to ARU but patient refused and asked for OP rehab. After discussion with him, he agreed on home therapy.          Significant Diagnostic Studies:   Labs / Micro:  CBC:   Lab Results   Component Value Date    WBC 8.2 2022    RBC 4.87 2022    HGB 14.9 2022    HCT 45.0 2022    MCV 92.4 2022    MCH 30.6 2022    MCHC 33.1 2022    RDW 13.1 2022     2022     BMP:    Lab Results   Component Value Date    GLUCOSE 149 2022     2022    K 3.7 01/16/2022     01/16/2022    CO2 22 01/16/2022    ANIONGAP 13 01/16/2022    BUN 16 01/16/2022    CREATININE 0.71 01/16/2022    BUNCRER NOT REPORTED 01/16/2022    CALCIUM 9.6 01/16/2022    LABGLOM >60 01/16/2022    GFRAA >60 01/16/2022    GFR      01/16/2022    GFR NOT REPORTED 01/16/2022     ,      Radiology:    CT HEAD WO CONTRAST    Result Date: 1/15/2022  EXAMINATION: CT OF THE HEAD WITHOUT CONTRAST  1/15/2022 10:27 pm TECHNIQUE: CT of the head was performed without the administration of intravenous contrast. Dose modulation, iterative reconstruction, and/or weight based adjustment of the mA/kV was utilized to reduce the radiation dose to as low as reasonably achievable. COMPARISON: Noncontrast head CT dated 10/14/2021. Noncontrast head CT dated 10/15/2021. HISTORY: ORDERING SYSTEM PROVIDED HISTORY: left-sided weakness, speech changes TECHNOLOGIST PROVIDED HISTORY: left-sided weakness, speech changes Decision Support Exception - unselect if not a suspected or confirmed emergency medical condition->Emergency Medical Condition (MA) Reason for Exam: left-sided weakness, speech changes FINDINGS: BRAIN/VENTRICLES: There is no acute intracranial hemorrhage, mass effect or midline shift. No abnormal extra-axial fluid collection. The gray-white differentiation is maintained without evidence of an acute infarct. There is no evidence of hydrocephalus. Old lacunar infarct is now identified posteriorly in the right basal ganglia where the previously noted subacute infarct was located. There appears to be an additional new tiny old lacunar infarct not seen previously immediately lateral to right caudate head. ORBITS: The visualized portion of the orbits demonstrate no acute abnormality. SINUSES: The visualized paranasal sinuses and mastoid air cells demonstrate no acute abnormality. SOFT TISSUES/SKULL:  No acute abnormality of the visualized skull or soft tissues. No acute intracranial abnormality.      CTA HEAD carotid, anterior cerebral, or middle cerebral arteries. No aneurysm. POSTERIOR CIRCULATION: No significant stenosis of the vertebral, basilar, or posterior cerebral arteries. No aneurysm. OTHER: No dural venous sinus thrombosis on this non-dedicated study. BRAIN: Preserved for to the report for the dedicated noncontrast head CT. No clearly acute abnormality identified. Mild to moderate focal stenosis at the origin of the left vertebral artery not significantly changed from prior. Borderline hemodynamically significant fusiform stenosis of the proximal intracavernous internal carotid artery on the right. MRI BRAIN WO CONTRAST    Result Date: 1/16/2022  EXAMINATION: MRI OF THE BRAIN WITHOUT CONTRAST,  1/16/2022 10:54 am TECHNIQUE: Multiplanar multisequence MRI of the brain was performed without the administration of intravenous contrast. COMPARISON: MRI brain 10/15/2021 and CT angiogram brain 01/15/2022. HISTORY: ORDERING SYSTEM PROVIDED HISTORY:  Right MCA stroke TECHNOLOGIST PROVIDED HISTORY: Right MCA stroke Reason for Exam:  Right MCA stroke FINDINGS: INTRACRANIAL STRUCTURES/VENTRICLES:  There is a 1.5 cm ovoid focus of restricted diffusion within the right paracentral raleigh consistent with an acute pontine infarct. There is no acute intracranial hemorrhage. There is no mass effect or midline shift. There is a remote lacunar infarct within the right thalamus. There are multiple foci of abnormal increased T2/FLAIR signal intensity within the periventricular and deep white matter of both hemispheres. There is no sellar or suprasellar mass present. There is no ventriculomegaly or abnormal extra-axial fluid collection present. The proximal portions of the [place] demonstrate normal flow voids. ORBITS: Limited evaluation of the orbits is unremarkable. SINUSES: The paranasal sinuses and mastoid air cells are clear.  BONES/SOFT TISSUES: Bone marrow signal intensity is normal.     1. Acute 1.5 cm infarct within the right paracentral raleigh. 2. No acute intracranial hemorrhage. 3. Remote right thalamic lacunar infarct. 4. Mild chronic small vessel ischemic changes. The findings were sent to the Radiology Results [id number] at 11:20 am on 1/16/2022 to be communicated to a licensed caregiver. Consultations:    Consults:     Final Specialist Recommendations/Findings:   IP CONSULT TO NEUROCRITICAL CARE  IP CONSULT TO CARDIOLOGY  IP CONSULT TO PHYSICAL MEDICINE REHAB  IP CONSULT TO NEUROLOGY      The patient was seen and examined on day of discharge and this discharge summary is in conjunction with any daily progress note from day of discharge. Discharge plan:       Disposition: Home    Physician Follow Up:     No follow-up provider specified. Requiring Further Evaluation/Follow Up POST HOSPITALIZATION/Incidental Findings:     Diet: regular diet    Activity: As tolerated    Instructions to Patient:  - Take all medications as prescribed  - Follow up with PCP   - In case of any worsening condition please visit Emergency Room.     Restrictions: Driving Yes, Swimming Yes, Operating heavy machinery Yes, Compromising heights Yes      Discharge Medications:      Medication List      START taking these medications    clopidogrel 75 MG tablet  Commonly known as: PLAVIX  Take 1 tablet by mouth daily  Start taking on: January 18, 2022        CONTINUE taking these medications    albuterol sulfate  (90 Base) MCG/ACT inhaler  Commonly known as: Ventolin HFA  Inhale 2 puffs into the lungs every 6 hours as needed for Wheezing     aspirin 81 MG EC tablet  Take 1 tablet by mouth daily     atorvastatin 80 MG tablet  Commonly known as: LIPITOR  Take 1 tablet by mouth nightly     Claritin 10 MG tablet  Generic drug: loratadine     lisinopril 5 MG tablet  Commonly known as: PRINIVIL;ZESTRIL     metFORMIN 500 MG tablet  Commonly known as: GLUCOPHAGE  Take 1 tablet by mouth 2 times daily (with meals) Where to Get Your Medications      These medications were sent to [facility] [address] [facility], [address]  [address], [address] [address]    Phone: [phone]   clopidogrel 75 MG tablet         Time Spent on discharge is  31 mins in patient examination, evaluation, counseling as well as medication reconciliation, prescriptions for required medications, discharge plan and follow up. Electronically signed by   Elly Iraheta MD  1/17/2022  1:38 PM      Thank you Dr. Karly Del Real primary care provider on file. for the opportunity to be involved in this patient's care.

## 2022-01-17 NOTE — CONSULTS
Physical Medicine & Rehabilitation  Consult Note      Admitting Physician:  No att. providers found    Primary Care Provider:  No primary care provider on file. Reason for Consult:  Acute Inpatient Rehabilitation    Chief Complaint:  Left upper limb weakness    History of Present Illness:  Referring Provider is requesting an evaluation for appropriate placement upon discharge from acute care. History from chart review, patient, and patient's son. Jacobo Machuca is a 60 y.o. male with history of CVA with residual left-sided weakness (10/2021), HTN admitted to [place] [address] on 1/15/2022. He initially presented with worsening left upper limb weakness. NIHSS 3. MRI brain showed acute right pontine infarct. LATA was done today and showed mild-moderate mitral regurgitation and mild aortic insufficiency but no thrombus or valvular vegetation. Loop recorder placed today. He currently notes ongoing left upper limb weakness greater than baseline. He states that he had some numbness/tingling in the left lower limb, which is now improving. He also reports some back pain due to lying in bed. He denies any other acute concerns. Review of Systems:  Review of Systems   Constitutional: Negative for fever. HENT: Negative for hearing loss. Eyes: Negative for blurred vision and double vision. Respiratory: Negative for shortness of breath. Cardiovascular: Negative for chest pain. Gastrointestinal: Negative for abdominal pain. No change in bowel control   Genitourinary:        No change in bladder control   Musculoskeletal: Positive for back pain. Skin: Negative for rash. Neurological: Positive for sensory change and weakness. Negative for headaches.       Premorbid function:  Independent    Current function:    PT:  Restrictions/Precautions: General Precautions,Up as Tolerated,Fall Risk  Other position/activity restrictions: Pt reports he uses L AFO outside the home, ambulates without L AFO inside home. SBO goal 120-200  Required Braces or Orthoses  Left Lower Extremity Brace: Haley Davidson 53 (Not in hospital this date)   Transfers  Sit to Stand: Contact guard assistance,Stand by assistance (Pt rocks prior to standing up. )  Stand to sit: Stand by assistance  Bed to Chair: Stand by assistance (RW)  Ambulation 1  Surface: level tile  Device: Rolling Walker  Assistance: Stand by assistance,Contact guard assistance  Quality of Gait: Decreased coordiantion noted slightly L LE, at times BNOS, slight increased steppage noted L LE due to L DF weakness, to obtain foot clearance. Pt initially needs assit for Left , but than roselyn to maintain gripp in rolling walker, although very weak . Per pt, his  strenght is weaker now,  Gait Deviations: Decreased step length  Distance: 40 ft, 10 ft    Transfers  Sit to Stand: Contact guard assistance,Stand by assistance (Pt rocks prior to standing up. )  Stand to sit: Stand by assistance  Bed to Chair: Stand by assistance (RW)  Ambulation  Ambulation?: Yes  Ambulation 1  Surface: level tile  Device: Rolling Walker  Assistance: Stand by assistance,Contact guard assistance  Quality of Gait: Decreased coordiantion noted slightly L LE, at times BNOS, slight increased steppage noted L LE due to L DF weakness, to obtain foot clearance. Pt initially needs assit for Left , but than roselyn to maintain gripp in rolling walker, although very weak . Per pt, his  strenght is weaker now,  Gait Deviations: Decreased step length  Distance: 40 ft, 10 ft    Surface: level tile  Ambulation 1  Surface: level tile  Device: Rolling Walker  Assistance: Stand by assistance,Contact guard assistance  Quality of Gait: Decreased coordiantion noted slightly L LE, at times BNOS, slight increased steppage noted L LE due to L DF weakness, to obtain foot clearance. Pt initially needs assit for Left , but than roselyn to maintain gripp in rolling walker, although very weak .  Per Transfers  Toilet - Technique: Ambulating  Equipment Used: Grab bars  Toilet Transfer: Minimal assistance  Toilet Transfers Comments: RW used             SLP:  Subjective: [x]? Alert     [x]? Cooperative     []? Confused     []? Agitated    []? Lethargic        Objective/Assessment:     Speech: Verbal and written education provided regarding strategies for dysarthria. Pt completed OMEX x2-5 each with min cues.      Pt implements strategies for clear speech with minimal verbal cues in phrases 7/7. Past Medical History:        Diagnosis Date    Hypertension     Tobacco abuse        Past Surgical History:    History reviewed. No pertinent surgical history. Allergies: Allergies   Allergen Reactions    Penicillins Rash        Current Medications:   No current facility-administered medications for this encounter. Family History:   History reviewed. No pertinent family history. Social History:  Lives With: Daughter,Son  Type of Home: House  Home Layout: Two level,Performs ADL's on one level,Able to Live on Main level with bedroom/bathroom  Home Access: Stairs to enter without rails  Entrance Stairs - Number of Steps: 2-3  Bathroom Shower/Tub: Walk-in shower  Bathroom Toilet: Standard  Bathroom Equipment: Built-in shower seat  Bathroom Accessibility: Walker accessible  Home Equipment: Rolling walker,Quad cane (uses quad cane-very short distances only, mainly uses RW)  Receives Help From: Family  ADL Assistance: [facility]: Needs assistance  Homemaking Responsibilities:  (Family does most the home making chores since pt's CVA in [place])  Ambulation Assistance: Independent  Transfer Assistance: Independent  Active : No  Patient's  Info: Dtr drives  Mode of Transportation: [employer]iella [employer]  Occupation: Unemployed  IADL Comments: Reports his bed height at home is low.   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 Per pt, pt has some one at home most of the time between pt's son, daughter and pt's father. Pt's father lives closeby and comes over often. Social History     Socioeconomic History    Marital status:      Spouse name: None    Number of children: None    Years of education: None    Highest education level: None   Occupational History    None   Tobacco Use    Smoking status: Current Every Day Smoker     Packs/day: 1.00     Years: 30.00     Pack years: 30.00     Types: Cigarettes    Smokeless tobacco: Never Used   Substance and Sexual Activity    Alcohol use: Yes     Comment: rarely    Drug use: Yes     Types: Marijuana [place] Post)    Sexual activity: None   Other Topics Concern    None   Social History Narrative    None     Social Determinants of Health     Financial Resource Strain:     Difficulty of Paying Living Expenses: Not on file   Food Insecurity:     Worried About Running Out of Food in the Last Year: Not on file    [place] of Food in the Last Year: Not on file   Transportation Needs:     Lack of Transportation (Medical): Not on file    Lack of Transportation (Non-Medical):  Not on file   Physical Activity:     Days of Exercise per Week: Not on file    Minutes of Exercise per Session: Not on file   Stress:     Feeling of Stress : Not on file   Social Connections:     Frequency of Communication with Friends and Family: Not on file    Frequency of Social Gatherings with Friends and Family: Not on file    Attends Rastafari Services: Not on file    Active Member of Clubs or Organizations: Not on file    Attends Club or Organization Meetings: Not on file    Marital Status: Not on file   Intimate Partner Violence:     Fear of Current or Ex-Partner: Not on file    Emotionally Abused: Not on file    Physically Abused: Not on file    Sexually Abused: Not on file   Housing Stability:     Unable to Pay for Housing in the Last Year: Not on file    Number of [place] in the Last Year: Not on file    Unstable Housing in the Last Year: Not on file       Physical Exam:  BP (!) 135/91   Pulse 92   Temp 98 °F (36.7 °C) (Oral)   Resp 21   Ht 5' 10\" (1.778 m)   Wt 200 lb (90.7 kg)   SpO2 95%   BMI 28.70 kg/m²     GEN: Well developed, well nourished, no acute distress  HEENT: NCAT. EOM grossly intact. Hearing grossly intact. Mucous membranes pink and moist.  RESP: Normal breath sounds with no wheezing, rales, or rhonchi. Respirations WNL and unlabored. CV: Regular rate and rhythm. No murmurs, rubs, or gallops. ABD: Soft, non-distended, BS+ and equal.  NEURO: Alert. Speech fluent. Sensation to light touch intact. MSK:  Muscle bulk is normal bilaterally. Strength 5/5 in right upper and lower limbs. Strength 5/5 with left knee extension and 2/5 with left ankle dorsiflexion. Able to lift the left upper limb partially against gravity - using mostly shoulder motion to move left upper limb. LIMBS: No edema in bilateral lower limbs. SKIN: Warm and dry with good turgor. PSYCH: Mood WNL. Affect WNL. Appropriately interactive. Diagnostics:    CBC:   Recent Labs     01/15/22  1907 01/16/22  0452   WBC 10.1 8.2   RBC 5.28 4.87   HGB 16.5 14.9   HCT 48.9 45.0   MCV 92.6 92.4   RDW 13.2 13.1    204     BMP:   Recent Labs     01/15/22  1907 01/16/22  0452    135   K 4.0 3.7   CL 99 100   CO2 20 22   BUN 17 16   CREATININE 0.82 0.71   GLUCOSE 132* 149*      HbA1c:   Lab Results   Component Value Date    LABA1C 6.7 (H) 01/16/2022     BNP: No results for input(s): BNP in the last 72 hours.   PT/INR:   Recent Labs     01/15/22  1907 01/17/22  1242   PROTIME 10.6 10.8   INR 1.0 1.0     APTT:   Recent Labs     01/15/22  1907 01/17/22  1242   APTT 23.6 22.6     CARDIAC ENZYMES:   Recent Labs     01/15/22  1907   CKMB 2.6   TROPONINT NOT REPORTED     FASTING LIPID PANEL:  Lab Results   Component Value Date    CHOL 80 01/16/2022    HDL 27 (L) 01/16/2022    TRIG 128 01/16/2022 LIVER PROFILE: No results for input(s): AST, ALT, ALB, BILIDIR, BILITOT, ALKPHOS in the last 72 hours. Radiology:  MRI BRAIN WO CONTRAST   Final Result   1. Acute 1.5 cm infarct within the right paracentral raleigh. 2. No acute intracranial hemorrhage. 3. Remote right thalamic lacunar infarct. 4. Mild chronic small vessel ischemic changes. The findings were sent to the Radiology Results [address] at 11:20   am on 1/16/2022 to be communicated to a licensed caregiver. CT HEAD WO CONTRAST   Final Result   No acute intracranial abnormality. CTA HEAD NECK W CONTRAST   Final Result   No clearly acute abnormality identified. Mild to moderate focal stenosis at the origin of the left vertebral artery   not significantly changed from prior. Borderline hemodynamically significant fusiform stenosis of the proximal   intracavernous internal carotid artery on the right. Impression:    1. Right pontine CVA  2. Left hemiparesis  3. Mild dysarthria  4. HTN  5. History of CVA with residual left-sided weakness. 6. Tobacco use    Recommendations:    1. Diagnosis:  Right pontine CVA  2. Therapy: Has PT/OT/SLP needs  3. Medical Necessity: As above  4. Support: Lives with daughter and son, who can provide assistance  5. Rehab Recommendation: The patient does not meet criteria for acute inpatient rehabilitation at this time, as he is too high-functioning. He currently has minimal OT and SLP needs. 6. DVT Prophylaxis: Lovenox    It was my pleasure to evaluate Jatinder Mccrary today. Please call with questions.     Stephanie Damian MD

## 2022-01-17 NOTE — PLAN OF CARE
Problem: Falls - Risk of:  Goal: Will remain free from falls  Description: Will remain free from falls  1/17/2022 0451 by Ana Cristina Barboza RN  Outcome: Ongoing  1/17/2022 0451 by Ana Cristina Barboza RN  Outcome: Ongoing  Goal: Absence of physical injury  Description: Absence of physical injury  1/17/2022 0451 by Ana Cristina Barboza RN  Outcome: Ongoing  1/17/2022 0451 by Ana Cristina Barboza RN  Outcome: Ongoing     Problem: HEMODYNAMIC STATUS  Goal: Patient has stable vital signs and fluid balance  1/17/2022 0451 by Ana Cristina Barboza RN  Outcome: Ongoing  1/17/2022 0451 by Ana Cristina Barboza RN  Outcome: Ongoing     Problem: ACTIVITY INTOLERANCE/IMPAIRED MOBILITY  Goal: Mobility/activity is maintained at optimum level for patient  1/17/2022 0451 by Ana Cristina Barboza RN  Outcome: Ongoing  1/17/2022 0451 by Ana Cristina Barboza RN  Outcome: Ongoing     Problem: COMMUNICATION IMPAIRMENT  Goal: Ability to express needs and understand communication  1/17/2022 0451 by Ana Cristina Barboza RN  Outcome: Ongoing  1/17/2022 0451 by Ana Cristina Barboza RN  Outcome: Ongoing     Problem: Neurological  Goal: Maximum potential motor/sensory/cognitive function  1/17/2022 0451 by Ana Cristina Barboza RN  Outcome: Ongoing  1/17/2022 0451 by Ana Cristina Barboza RN  Outcome: Ongoing

## 2022-01-17 NOTE — PROGRESS NOTES
Speech Language Pathology  Speech Language Pathology  [facility]    Speech Language Treatment Note    Date: 1/17/2022  Patients Name: Cecilia Shepherd  MRN: [id number]  Diagnosis:   Patient Active Problem List   Diagnosis Code    Tobacco abuse Z72.0    Stroke-like symptom R29.90    Cerebrovascular accident (CVA) ([facility] [address] I63.9    Stroke-like episode R29.90    Left-sided weakness R53.1    Brainstem infarction ([facility] [address] I63.9    History of cerebral infarction Z86.73    Stroke determined by clinical assessment ([facility] [address] I63.9       Pain: 0/10    Speech and Language Treatment  Treatment time: 4635-1108    Subjective: [x] Alert [x] Cooperative     [] Confused     [] Agitated    [] Lethargic      Objective/Assessment:    Speech: Verbal and written education provided regarding strategies for dysarthria. Pt completed OMEX x2-5 each with min cues. Pt implements strategies for clear speech with minimal verbal cues in phrases 7/7. Plan:  [x] Continue ST services    [] Discharge from ST:      Discharge recommendations: []  Further therapy recommended at discharge. The patient should be able to tolerate at least 3 hours of therapy per day over 5 days or 15 hours over 7 days. [x] Further therapy recommended at discharge (if pt feels he would benefit from additional education/cues to implement strategies). [] No therapy recommended at discharge.       Treatment completed by: Whitney Whalen, SLP, M[facility]

## 2022-01-17 NOTE — CONSULTS
history on file. Home Medications:    Prior to Admission medications    Medication Sig Start Date End Date Taking? Authorizing Provider   lisinopril (PRINIVIL;ZESTRIL) 5 MG tablet take 1 tablet by mouth once daily 1/3/22   Historical Provider, MD   aspirin 81 MG EC tablet Take 1 tablet by mouth daily 1/10/22   651 Valente Roe MD   atorvastatin (LIPITOR) 80 MG tablet Take 1 tablet by mouth nightly 1/10/22   651 Valente Roe MD   metFORMIN (GLUCOPHAGE) 500 MG tablet Take 1 tablet by mouth 2 times daily (with meals) 10/18/21   Felix Guardado MD   albuterol sulfate HFA (VENTOLIN HFA) 108 (90 Base) MCG/ACT inhaler Inhale 2 puffs into the lungs every 6 hours as needed for Wheezing 6/2/21   Taiwo Son, CHRISTAL - CNP   loratadine (CLARITIN) 10 MG tablet Take 10 mg by mouth daily.   Patient not taking: Reported on 1/10/2022    Historical Provider, MD      Current Facility-Administered Medications: aspirin EC tablet 81 mg, 81 mg, Oral, Daily  atorvastatin (LIPITOR) tablet 80 mg, 80 mg, Oral, Nightly  lisinopril (PRINIVIL;ZESTRIL) tablet 5 mg, 5 mg, Oral, Daily  sodium chloride flush 0.9 % injection 5-40 mL, 5-40 mL, IntraVENous, 2 times per day  sodium chloride flush 0.9 % injection 5-40 mL, 5-40 mL, IntraVENous, PRN  0.9 % sodium chloride infusion, 25 mL, IntraVENous, PRN  aspirin chewable tablet 324 mg, 324 mg, Oral, Once  clopidogrel (PLAVIX) tablet 300 mg, 300 mg, Oral, Once  ondansetron (ZOFRAN-ODT) disintegrating tablet 4 mg, 4 mg, Oral, Q8H PRN **OR** ondansetron (ZOFRAN) injection 4 mg, 4 mg, IntraVENous, Q6H PRN  polyethylene glycol (GLYCOLAX) packet 17 g, 17 g, Oral, Daily PRN  enoxaparin (LOVENOX) injection 40 mg, 40 mg, SubCUTAneous, Daily  0.9 % sodium chloride infusion, , IntraVENous, Continuous  labetalol (NORMODYNE;TRANDATE) injection 10 mg, 10 mg, IntraVENous, Q10 Min PRN  acetaminophen (TYLENOL) tablet 650 mg, 650 mg, Oral, Q4H PRN **OR** acetaminophen (TYLENOL) suppository 650 mg, 650 mg, Rectal, Q4H PRN  nicotine (NICODERM CQ) 14 MG/24HR 1 patch, 1 patch, TransDERmal, Daily  famotidine (PEPCID) tablet 20 mg, 20 mg, Oral, BID  insulin lispro (HUMALOG) injection vial 0-6 Units, 0-6 Units, SubCUTAneous, TID WC  insulin lispro (HUMALOG) injection vial 0-3 Units, 0-3 Units, SubCUTAneous, Nightly  glucose (GLUTOSE) 40 % oral gel 15 g, 15 g, Oral, PRN  dextrose 50 % IV solution, 12.5 g, IntraVENous, PRN  glucagon (rDNA) injection 1 mg, 1 mg, IntraMUSCular, PRN  dextrose 5 % solution, 100 mL/hr, IntraVENous, PRN  clopidogrel (PLAVIX) tablet 75 mg, 75 mg, Oral, Daily    Allergies:  Penicillins    Social History:   reports that he has been smoking cigarettes. He has a 30.00 pack-year smoking history. He has never used smokeless tobacco. He reports current alcohol use. He reports current drug use. Drug: Marijuana [employer]). Family History: family history is not on file. No h/o sudden cardiac death. No for premature CAD    REVIEW OF SYSTEMS:    · Constitutional: decreased activity level    · Eyes: No visual changes or diplopia. No scleral icterus. · ENT: No Headaches  · Cardiovascular: No CP or SOB  · Respiratory: No previous pulmonary problems, No cough  · Gastrointestinal: No abdominal pain. No change in bowel or bladder habits. · Genitourinary: No dysuria, trouble voiding, or hematuria. · Musculoskeletal:  No gait disturbance, No weakness or joint complaints. · Integumentary: No rash or pruritis. · Neurological: No headache, diplopia, change in muscle strength, numbness or tingling. No change in gait, balance, coordination, mood, affect, memory, mentation, behavior. · Psychiatric: No anxiety, or depression. · Endocrine: No temperature intolerance. No excessive thirst, fluid intake, or urination. No tremor. · Hematologic/Lymphatic: No abnormal bruising or bleeding, blood clots or swollen lymph nodes. · Allergic/Immunologic: No nasal congestion or hives.       PHYSICAL EXAM:      /79   Pulse 68   Temp 97.9 °F (36.6 °C) (Oral)   Resp 23   Ht 5' 10\" (1.778 m)   Wt 200 lb (90.7 kg)   SpO2 94%   BMI 28.70 kg/m²    Constitutional and General Appearance: alert, cooperative, no distress and appears stated age  [de-identified]: PERRL, no cervical lymphadenopathy. No masses palpable. Normal oral mucosa  Respiratory:  · No accessory muscle use  · CTAB  Cardiovascular:  · The apical impulse is not displaced  · Heart tones are crisp and normal. regular S1 and S2. Abdomen:   · No masses or tenderness  · Bowel sounds present  Extremities:  ·  No Cyanosis or Clubbing  ·  Lower extremity edema: No  ·  Skin: Warm and dry  Neurological:  · Alert and oriented. · Weak LUE and LLE  · Left sided facial droop  · dysarthria    DATA:    Diagnostics:    EKG: NSR  ECHO: 10/14/21 - normal LV systolic function, Negative bubble study. Labs:     CBC:   Recent Labs     01/15/22  1907 01/16/22  0452   WBC 10.1 8.2   HGB 16.5 14.9   HCT 48.9 45.0    204     BMP:   Recent Labs     01/15/22  1907 01/16/22  0452    135   K 4.0 3.7   CO2 20 22   BUN 17 16   CREATININE 0.82 0.71   LABGLOM >60 >60   GLUCOSE 132* 149*     BNP: No results for input(s): BNP in the last 72 hours. PT/INR:   Recent Labs     01/15/22  1907   PROTIME 10.6   INR 1.0     APTT:  Recent Labs     01/15/22  1907   APTT 23.6     CARDIAC ENZYMES:  Recent Labs     01/15/22  1907   CKTOTAL 221   CKMB 2.6     FASTING LIPID PANEL:  Lab Results   Component Value Date    HDL 27 01/16/2022    TRIG 128 01/16/2022     LIVER PROFILE:No results for input(s): AST, ALT, LABALBU in the last 72 hours. IMPRESSION:    Patient Active Problem List   Diagnosis    Tobacco abuse    Stroke-like symptom    [facility] ([facility]A) [facility] [address]    Stroke-like episode    Left-sided weakness       RECOMMENDATIONS:  1. Acute pontine CVA  2. Essential HTN  3. DM2  4. Follow up LATA and loop placement  5. Continue ASA, plavix, statin  6. Permissive HTN  7.  Remaining work up per [employer] teams        Discussed with patient and Nurse.     Electronically signed by Zana Pineda MD on 1/17/2022 at 8:07 [address] [place] [address] [facility]      [phone]

## 2022-01-17 NOTE — PROGRESS NOTES
[facility]   [address] [facility]    Resident Progress Note             Date:   1/17/2022  Patient name:  Shimon Norris  Date of admission:  1/15/2022  6:49 PM  MRN:   [id number]  Account:  [de-identified]  YOB: 1965  PCP:    No primary care provider on file. Room:   [address] [place] [address]  Code Status:    Full Code    Subjective: Interval History:     The patient was seen and examined and the chart was reviewed. There were no acute events overnight. VS and hemodynamics stable overnight and this morning. Patient was awake, alert, oriented. Patient is working with PT and was able to walk 40 ft yesterday using rolling walker. Left hemiplegia on exam (2/5 LUE and 3/5 LLE). Patient had LATA and Loop recorder today. He was asking for discharge and he wants to leave     Brief History: This is a 60 y.o.  male admitted 1/15/2022 for Left-sided weakness [R53.1]  Stroke-like episode [R29.90]     60 male with PMH of HTN, DM, HLD, smoking (30 pack year), previous stroke right BG lacunar with residual left sided weakness (October 2021) on ASA prior to presentation. Presented with worsening left upper and lower Ext weakness and numbness. The patient mentioned he overworked with PT when he had this complaint. NIHSS 3, SBP > 160, BG was normal. CTH showed old right BG lacunar infarct but negative for acute changes, CTA borderline hemodynamically significant fusiform stenosis of the proximal RT intercavernous ICA  Patient loaded with DAPT and was admitted to NICU based on endovascular recommendations. MRI brain showed new RT pontine stroke   Neuro-examination showed 3/5 in LUE distally and 4/5 proximally  LLE distal and proximal 3/5 with mild sensory loss of touch on Left upper and lower Ext. Hyperreflexia on Left side. LDL 27, A1c 6.7     ROS  Constitutional: no fever, chills, fatigue  HENT: No change in vision or hearing   Respiratory: No cough, SOB, wheezing.     Cardiovascular: No chest pain, palpitations, leg swelling. Gastrointestinal: No nausea, vomiting, diarrhea. Genitourinary: No increased frequency, urgency. Musculoskeletal: No myalgia or arthralgia. Skin: No rashes or scarring or bruises. Neurological: No headache, paresthesia, or focal weakness. Endo/Heme/Allergies: Negative for itchy eyes or runny nose. Psychiatric/Behavioral: No anxiety or depressed mood.  aspirin  81 mg Oral Daily    atorvastatin  80 mg Oral Nightly    lisinopril  5 mg Oral Daily    sodium chloride flush  5-40 mL IntraVENous 2 times per day    aspirin  324 mg Oral Once    clopidogrel  300 mg Oral Once    enoxaparin  40 mg SubCUTAneous Daily    nicotine  1 patch TransDERmal Daily    famotidine  20 mg Oral BID    insulin lispro  0-6 Units SubCUTAneous TID WC    insulin lispro  0-3 Units SubCUTAneous Nightly    clopidogrel  75 mg Oral Daily       Past Medical History:   Diagnosis Date    Hypertension     Tobacco abuse        History reviewed. No pertinent surgical history. Objective:     PHYSICAL EXAM:      Blood pressure 129/79, pulse 68, temperature 97.9 °F (36.6 °C), temperature source Oral, resp. rate 23, height 5' 10\" (1.778 m), weight 200 lb (90.7 kg), SpO2 94 %. General Examination    General Resting comfortably in bed   Head Normocephalic, without obvious abnormality   Neck Supple, symmetrical. Good ROM. No midline or paraspinal tenderness. Lungs Respirations unlabored, no wheezing   Chest Wall No deformity   Heart RRR, no murmur   Abdomen Soft. Non-tender, non-distended   Extremities No cyanosis or edema or warmth. Pulses 2+ and symmetric   Skin: Skin  turgor normal, no rashes or lesions         Mental status  Speech Alert. Oriented to person, place, and time. Speech is mildly dysarthric, question if this is related to his dentures. Good repetition and naming  Can do 1 step, 2 step, and cross-body commands  Can spell world backwards. Language appropriate. No hallucinations or delusions. No SI/HI. Cranial nerves   II - VFF, visual threat intact  III, IV, VI - extra-ocular muscles full. No nystagmus. Pupils symmetric and responsive. V - sensation symmetric         VII -  No facial droop or asymmetric NLF  VIII - intact hearing to conversational tone          IX, X - symmetrical palate elevation   XI - 5/5 strength symmetric  XII - tongue midline   Motor function  2/5 LUE and 3/5 LLE   Sensory function Mild decreased sensation over the left side of the body (left upper and lower Ext)   Cerebellar No dysmetria or dysdiadochocinesia    Reflex function DTR:        2+ b/l symmetric in biceps, brachioradialis, patellar, calcaneal  Babinski b/l plantar downgoing   Gait                  Not assessed       Investigations:      Laboratory Testing:  Recent Results (from the past 24 hour(s))   POC Glucose Fingerstick    Collection Time: 01/16/22  9:52 AM   Result Value Ref Range    POC Glucose 148 (H) 75 - 110 mg/dL       Imaging/Diagnostics:  CT HEAD WO CONTRAST    Result Date: 1/15/2022  EXAMINATION: CT OF THE HEAD WITHOUT CONTRAST  1/15/2022 10:27 pm TECHNIQUE: CT of the head was performed without the administration of intravenous contrast. Dose modulation, iterative reconstruction, and/or weight based adjustment of the mA/kV was utilized to reduce the radiation dose to as low as reasonably achievable. COMPARISON: Noncontrast head CT dated 10/14/2021. Noncontrast head CT dated 10/15/2021. HISTORY: ORDERING SYSTEM PROVIDED HISTORY: left-sided weakness, speech changes TECHNOLOGIST PROVIDED HISTORY: left-sided weakness, speech changes Decision Support Exception - unselect if not a suspected or confirmed emergency medical condition->Emergency Medical Condition (MA) Reason for Exam: left-sided weakness, speech changes FINDINGS: BRAIN/VENTRICLES: There is no acute intracranial hemorrhage, mass effect or midline shift. No abnormal extra-axial fluid collection.   The gray-white differentiation is maintained without evidence of an acute infarct. There is no evidence of hydrocephalus. Old lacunar infarct is now identified posteriorly in the right basal ganglia where the previously noted subacute infarct was located. There appears to be an additional new tiny old lacunar infarct not seen previously immediately lateral to right caudate head. ORBITS: The visualized portion of the orbits demonstrate no acute abnormality. SINUSES: The visualized paranasal sinuses and mastoid air cells demonstrate no acute abnormality. SOFT TISSUES/SKULL:  No acute abnormality of the visualized skull or soft tissues. No acute intracranial abnormality. CTA HEAD NECK W CONTRAST    Result Date: 1/15/2022  EXAMINATION: CTA OF THE HEAD AND NECK WITH CONTRAST 1/15/2022 10:27 pm: TECHNIQUE: CTA of the head and neck was performed with the administration of intravenous contrast. Multiplanar reformatted images are provided for review. MIP images are provided for review. Stenosis of the internal carotid arteries measured using NASCET criteria. Dose modulation, iterative reconstruction, and/or weight based adjustment of the mA/kV was utilized to reduce the radiation dose to as low as reasonably achievable. COMPARISON: 10/14/2021 HISTORY: ORDERING SYSTEM PROVIDED HISTORY: left-sided weakness, slurring speech TECHNOLOGIST PROVIDED HISTORY: left-sided weakness, slurring speech Decision Support Exception - unselect if not a suspected or confirmed emergency medical condition->Emergency Medical Condition (MA) Reason for Exam: left-sided weakness, speech changes FINDINGS: CTA NECK: AORTIC ARCH/ARCH VESSELS: No dissection or arterial injury. No significant stenosis of the brachiocephalic or subclavian arteries. CAROTID ARTERIES: No dissection, arterial injury, or hemodynamically significant stenosis by NASCET criteria. VERTEBRAL ARTERIES: No dissection or arterial injury. No significant stenosis on the right.   There is chronic mild to moderate focal stenosis at the origin of the left vertebral artery which does not appear significantly changed from prior. SOFT TISSUES: The lung apices are clear. No cervical or superior mediastinal lymphadenopathy. The larynx and pharynx are unremarkable. No acute abnormality of the salivary and thyroid glands. BONES: No acute osseous abnormality. CTA HEAD: ANTERIOR CIRCULATION: At the proximal intracavernous right internal carotid artery, there is borderline hemodynamically significant stenosis presumably due to soft atheromatous plaque. This appears to be new since the previous exam.  Otherwise, no significant stenosis of the intracranial internal carotid, anterior cerebral, or middle cerebral arteries. No aneurysm. POSTERIOR CIRCULATION: No significant stenosis of the vertebral, basilar, or posterior cerebral arteries. No aneurysm. OTHER: No dural venous sinus thrombosis on this non-dedicated study. BRAIN: Preserved for to the report for the dedicated noncontrast head CT. No clearly acute abnormality identified. Mild to moderate focal stenosis at the origin of the left vertebral artery not significantly changed from prior. Borderline hemodynamically significant fusiform stenosis of the proximal intracavernous internal carotid artery on the right. MRI BRAIN WO CONTRAST    Result Date: 1/16/2022  EXAMINATION: MRI OF THE BRAIN WITHOUT CONTRAST,  1/16/2022 10:54 am TECHNIQUE: Multiplanar multisequence MRI of the brain was performed without the administration of intravenous contrast. COMPARISON: MRI brain 10/15/2021 and CT angiogram brain 01/15/2022. HISTORY: ORDERING SYSTEM PROVIDED HISTORY:  Right MCA stroke TECHNOLOGIST PROVIDED HISTORY: Right MCA stroke Reason for Exam:  Right MCA stroke FINDINGS: INTRACRANIAL STRUCTURES/VENTRICLES:  There is a 1.5 cm ovoid focus of restricted diffusion within the right paracentral [place] consistent with an acute pontine infarct.   There is no acute intracranial hemorrhage. There is no mass effect or midline shift. There is a remote lacunar infarct within the right thalamus. There are multiple foci of abnormal increased T2/FLAIR signal intensity within the periventricular and deep white matter of both hemispheres. There is no sellar or suprasellar mass present. There is no ventriculomegaly or abnormal extra-axial fluid collection present. The proximal portions of the [place] demonstrate normal flow voids. ORBITS: Limited evaluation of the orbits is unremarkable. SINUSES: The paranasal sinuses and mastoid air cells are clear. BONES/SOFT TISSUES: Bone marrow signal intensity is normal.     1. Acute 1.5 cm infarct within the right paracentral r[place]. 2. No acute intracranial hemorrhage. 3. Remote right thalamic lacunar infarct. 4. Mild chronic small vessel ischemic changes. The findings were sent to the Radiology Results [address] at 11:20 am on 1/16/2022 to be communicated to a licensed caregiver. Assessment & Differential Dx:      Primary Problem  <principal problem not specified>    Active Hospital Problems    Diagnosis Date Noted    Left-sided weakness [R53.1]     Stroke-like episode [R29.90] 01/15/2022     60years old male patient with multiple stroke RF (HTN, DM, smoking, HLD), previous hx of right BG lacunar stroke with residual left side weakness presented with worsening left side weakness and numbness. MRI brain showing new right pontine stroke. CTA showed stenosis of right cavernous ICA. Endovascular following with no acute interventions or DSA planned for now. Patient was loaded with DPAT. Further stroke workup is warranted with LATA/loop and hypercoaguable panel.     New RT pontine stroke, previous RT BG lacunar infarct     Plan:      LATA with loop done today   Hypercoagulable workup pending   Continue ASA 81 + Plavix 75 + lipitor 80 mg daily   Endovascular following for right ICA cavernous stenosis, no acute interventions or DSA   Lisinopril 5 mg, goal SBP <140/90   Insulin for DM management   Lovenox for DVT prophylaxis    PT/OT   Dispo:Home with Golden Kearney MD, MD, 1/17/2022 5:49 AM

## 2022-01-17 NOTE — PROGRESS NOTES
[facility]  Documentation Note                Admission Dx: Left-sided weakness [R53.1]  Stroke-like episode [R29.90]    Past Medical History:   has a past medical history of Hypertension and Tobacco abuse. Previous Testing:     ECHO 10/18/2021: EF 51%, negative bubble study, no significant valvular abnormalities. Previous office/hospital visit:   None? ?      Reginald Barone [facility]

## 2022-01-17 NOTE — PROGRESS NOTES
CLINICAL PHARMACY NOTE: MEDS TO BEDS    Total # of Prescriptions Filled: 1   The following medications were delivered to the patient:  · Clopidogrel 75 mg tab    Additional Documentation:Medication deliveredto the patient in room 539 @ 3:48 pm, sylwia payment / I pad.

## 2022-01-17 NOTE — Clinical Note
Pt arrived on unit from Neuro ICU (5B). Writer received report from Dayo honeycutt RN. Pt is in room 539.

## 2022-01-17 NOTE — PROGRESS NOTES
Physical Therapy        Physical Therapy Cancel Note      DATE: 2022    NAME: Sal Christianson  MRN: [id number]   : 1965      Patient not seen this date for Physical Therapy due to:    Testing: LATA      Electronically signed by Vickie Castillo PTA on 2022 at 9:57 AM

## 2022-01-17 NOTE — DISCHARGE INSTR - COC
Continuity of Care Form    Patient Name: Ligia Skinner   :  1965  MRN:  [id number]    Admit date:  1/15/2022  Discharge date:  ***    Code Status Order: Full Code   Advance Directives:      Admitting Physician:  Jesi Pierre MD  PCP: No primary care provider on file. Discharging Nurse: [facility] Unit/Room#: 7137/0760-05  Discharging Unit Phone Number: ***    Emergency Contact:   Extended Emergency Contact Information  Primary Emergency Contact: Evens Lobo Or Eyad Garcia [address] Phone: [phone]  Relation: Parent  Secondary Emergency Contact: Elvira Soliman  Mobile Phone: [phone]  Relation: Child    Past Surgical History:  History reviewed. No pertinent surgical history. Immunization History: There is no immunization history on file for this patient. Active Problems:  Patient Active Problem List   Diagnosis Code    Tobacco abuse Z72.0    Stroke-like symptom R29.90    Cerebrovascular accident (CVA) ([facility] [address] I63.9    Stroke-like episode R29.90    Left-sided weakness R53.1    Brainstem infarction ([facility] [address] I63.9    History of cerebral infarction Z86.73       Isolation/Infection:   Isolation            No Isolation          Patient Infection Status       None to display            Nurse Assessment:  Last Vital Signs: BP (!) 135/91   Pulse 92   Temp 98 °F (36.7 °C) (Oral)   Resp 21   Ht 5' 10\" (1.778 m)   Wt 200 lb (90.7 kg)   SpO2 95%   BMI 28.70 kg/m²     Last documented pain score (0-10 scale): Pain Level: 0  Last Weight:   Wt Readings from Last 1 Encounters:   22 200 lb (90.7 kg)     Mental Status:  {IP PT MENTAL STATUS:}    IV Access:  - None    Nursing Mobility/ADLs:  Walking   Assisted  Transfer  Assisted  Bathing  Assisted  Dressing  Assisted  Toileting  Assisted  Feeding  Independent  Med Admin  Independent  Med Delivery   whole    Wound Care Documentation and Therapy:        Elimination:  Continence:    Bowel: Yes  Bladder: Yes  Urinary Catheter: None   Colostomy/Ileostomy/Ileal Conduit: No       Date of Last BM: ***  No intake or output data in the 24 hours ending 01/17/22 1300  I/O last 3 completed shifts: In: 200 [P.O.:200]  Out: 700 [Urine:700]    Safety Concerns:     History of Falls (last 30 days) and At Risk for Falls    Impairments/Disabilities:      Speech and Vision wears glasses    Nutrition Therapy:  Current Nutrition Therapy:   - Oral Diet:  General    Routes of Feeding: Oral  Liquids: No Restrictions  Daily Fluid Restriction: no  Last Modified Barium Swallow with Video (Video Swallowing Test): not done    Treatments at the Time of Hospital Discharge:   Respiratory Treatments: ***  Oxygen Therapy:  is not on home oxygen therapy.   Ventilator:    - No ventilator support    Rehab Therapies: Physical Therapy, Occupational Therapy, and Speech/Language Therapy  Weight Bearing Status/Restrictions: No weight bearing restirctions  Other Medical Equipment (for information only, NOT a DME order):  walker  Other Treatments: ***    Patient's personal belongings (please select all that are sent with patient):  Glasses, Dentures upper and lower    RN SIGNATURE:  Electronically signed by Fanny Campos RN on 1/17/22 at 3:09 PM EST    CASE MANAGEMENT/SOCIAL WORK SECTION    Inpatient Status Date: ***    Readmission Risk Assessment Score:  Readmission Risk              Risk of Unplanned Readmission:  0           Discharging to Facility/ Agency   Name:   Address:  Phone:  Fax:    Dialysis Facility (if applicable)   Name:  Address:  Dialysis Schedule:  Phone:  Fax:    / signature: {Esignature:[id number]}    PHYSICIAN SECTION    Prognosis: {Prognosis:8602304274}    Condition at Discharge: [facility] Patient Condition:772099590}    Rehab Potential (if transferring to Rehab): {Prognosis:3952930654}    Recommended Labs or Other Treatments After Discharge: ***    Physician Certification: I certify the above information and transfer of Beny Patterson Soo Gonzalezin  is necessary for the continuing treatment of the diagnosis listed and that he requires {Admit to Appropriate Level of Care:33331} for {GREATER/LESS:547198247} 30 days.      Update Admission H&P: {CHP DME Changes in NFR:545547710}    PHYSICIAN SIGNATURE:  Electronically signed by Francisco Bradley MD on 1/17/22 at 1:00 PM EST

## 2022-01-18 ENCOUNTER — CARE COORDINATION (OUTPATIENT)
Dept: CASE MANAGEMENT | Age: 57
End: 2022-01-18

## 2022-01-18 LAB
ANTICARDIOLIPIN IGA ANTIBODY: 2.5 APL (ref 0–14)
ANTICARDIOLIPIN IGG ANTIBODY: 0.9 GPL (ref 0–10)
AT-III ACTIVITY: 107 % (ref 83–122)
BETA-2 MICROGLOBULIN: 2.8 MG/L (ref 0.6–2.4)
CARDIOLIPIN AB IGM: <0.8 MPL (ref 0–10)
DILUTE RUSSELL VIPER VENOM TIME: NORMAL
INR BLD: 1
LUPUS ANTICOAG: NORMAL
PARTIAL THROMBOPLASTIN TIME: 22.6 SEC (ref 20.5–30.5)
PROTEIN C ACTIVITY: 121 %
PROTEIN S ACTIVITY: 96 % (ref 77–116)
PROTHROMBIN TIME: 10.8 SEC (ref 9.1–12.3)

## 2022-01-18 ASSESSMENT — ENCOUNTER SYMPTOMS
ABDOMINAL PAIN: 0
BLURRED VISION: 0
BACK PAIN: 1
SHORTNESS OF BREATH: 0
DOUBLE VISION: 0

## 2022-01-18 NOTE — PROGRESS NOTES
Endovascular Neurosurgery Progress Note    SUBJECTIVE:   No reported events overnight. This am pt denies any new symptoms. Review of Systems:  CONSTITUTIONAL:  negative for fevers, chills, fatigue and malaise    EYES:  negative for double vision, blurred vision and photophobia     HEENT:  negative for tinnitus, epistaxis and sore throat    RESPIRATORY:  negative for cough, shortness of breath, wheezing    CARDIOVASCULAR:  negative for chest pain, palpitations, syncope, edema    GASTROINTESTINAL:  negative for nausea, vomiting    GENITOURINARY:  negative for incontinence    MUSCULOSKELETAL:  negative for neck or back pain    NEUROLOGICAL:  Negative for weakness and tingling  negative for headaches and dizziness    PSYCHIATRIC:  negative for anxiety      Review of systems otherwise negative. OBJECTIVE:     Vitals:    01/17/22 1129   BP: (!) 135/91   Pulse: 92   Resp: 21   Temp: 98 °F (36.7 °C)   SpO2: 95%        General:  Gen: normal habitus, NAD  HEENT: NCAT, mucosa moist  Cvs: RRR, S1 S2 normal  Resp: symmetric unlabored breathing  Abd: s/nd/nt  Ext: no edema  Skin: no lesions seen, warm and dry     Neuro:  Gen: awake and alert, oriented x3. Lang/speech: no aphasia or dysarthria. Follows commands. CN: PERRL, EOMI, VFF, V1-3 intact, face symmetric, hearing intact, shoulder shrug symmetric, tongue midline  Motor: Left UE and LE 4/5, right UE and LE 5/5   Sense: LT intact all 4 ext. Coord: impaired on left UE and LE  DTR: deferred  Gait: deferred      NIH Stroke Scale:   1a  Level of consciousness: 0 - alert; keenly responsive   1b. LOC questions:  0 - answers both questions correctly   1c. LOC commands: 0 - performs both tasks correctly   2. Best Gaze: 0 - normal   3. Visual: 0 - no visual loss   4. Facial Palsy: 0 - normal symmetric movement   5a. Motor left arm: 1 - drift, limb holds 90 (or 45) degrees but drifts down before full 10 seconds: does not hit bed   5b.   Motor right arm: 0 - no drift, limb holds 90 (or 45) degrees for full 10 seconds   6a. Motor left le - drift; leg falls by the end of the 5 second period but does not hit bed   6b  Motor right le - no drift; leg holds 30 degree position for full 5 seconds   7. Limb Ataxia: 0 - absent   8. Sensory: 0   9. Best Language:  0 - no aphasia, normal   10. Dysarthria: 0 - normal   11. Extinction and Inattention: 0 - no abnormality             Total:   2      MRS: 2    LABS:   Reviewed. Lab Results   Component Value Date    HGB 14.9 2022    WBC 8.2 2022     2022     2022    BUN 16 2022    CREATININE 0.71 2022    APTT 22.6 2022    INR 1.0 2022      Lab Results   Component Value Date    COVID19 Not Detected 2022       RADIOLOGY:   Images were personally reviewed including:   CTA Head and neck 01/15/2022  No clearly acute abnormality identified.       Mild to moderate focal stenosis at the origin of the left vertebral artery   not significantly changed from prior.       Borderline hemodynamically significant fusiform stenosis of the proximal   intracavernous internal carotid artery on the right. ASSESSMENT:   61 y/o M with past medical history significant for hypertension, diabetes, dyslipidemia, previous right basal ganglia lacunar stroke with residual left-sided weakness presented with worsening left-sided weakness. CTA revealed right ICA cavernous stenosis. Mri shows acute R raleigh stoke in setting of L mod vert stenosis. PLAN:   --C/w ASA 81 and Plavix 75  --C/w liptor 80  --No plan for DSA or intervention for now  --stable for discharge from hospital from endovascular standpoint. --f/u dr. Adolph Dudley 2w after discharge, dr gomes 3 mo after discharge.     Case discussed with Dr. Hira Patel attending.     Audie Alcantar MD, PhD  Stroke, [facility] Stroke [address] [place]  Electronically signed 1/18/2022 at 1:44 AM

## 2022-01-18 NOTE — CARE COORDINATION
Renee [facility] Transitions Initial Follow Up Call    Call within 2 business days of discharge: Yes    Patient: Matthew Martell Patient : 1965   MRN: <[id number]>  Reason for Admission: Cerebrovascular Accident  Discharge Date: 22 RARS: Readmission Risk Score: 12.7 ( )      Last Discharge [facility]       Complaint Diagnosis Description Type Department Provider    1/15/22 Cerebrovascular Accident Left-sided weakness . .. ED to Hosp-Admission (Discharged) (ADMITTED) [facility] NEUR Maura Becerra MD; Ilda Altman. .. Spoke with: Jorge who states he is doing okay feels better still weak, denies chest pain, SOB, dizziness, eating and drinking okay, normal bowel and bladder elimination. Still has left sided weakness, medications reviewed ant taking as directed. Facility: [facility] provided:  Obtained and reviewed discharge summary and/or continuity of care documents  Transitions of Care Initial Call    Was this an external facility discharge? No     Challenges to be reviewed by the provider   Additional needs identified to be addressed with provider: No  none             Method of communication with provider : none      Advance Care Planning:   Does patient have an Advance Directive: reviewed and current, reviewed and needs to be updated, not on file; education provided, decision maker updated and referral to internal ACP facilitator. Was this a readmission? Yes  Patient stated reason for admission: Cerebrovascular Accident  Patients top risk factors for readmission: functional physical ability  lack of knowledge about disease  medication management  PCP relationship    Care Transition Nurse (CTN) contacted the patient by telephone to perform post hospital discharge assessment. Verified name and  with patient as identifiers. Provided introduction to self, and explanation of the CTN role.      CTN reviewed discharge instructions, medical action plan and red flags with patient who verbalized understanding. Patient given an opportunity to ask questions and does not have any further questions or concerns at this time. Were discharge instructions available to patient? Yes. Reviewed appropriate site of care based on symptoms and resources available to patient including: PCP and Specialist. The patient agrees to contact the PCP office for questions related to their healthcare. Medication reconciliation was performed with patient, who verbalizes understanding of administration of home medications. Advised obtaining a 90-day supply of all daily and as-needed medications. Covid Risk Education     Educated patient about risk for severe COVID-19 due to risk factors according to CDC guidelines. LPN CC reviewed discharge instructions, medical action plan and red flag symptoms with the patient who verbalized understanding. Discussed COVID vaccination status: Yes. Education provided on COVID-19 vaccination as appropriate. Discussed exposure protocols and quarantine with CDC Guidelines. Patient was given an opportunity to verbalize any questions and concerns and agrees to contact LPN CC or health care provider for questions related to their healthcare. Reviewed and educated patient on any new and changed medications related to discharge diagnosis. Was patient discharged with a pulse oximeter? No Discussed and confirmed pulse oximeter discharge instructions and when to notify provider or seek emergency care. LPN CC provided contact information. No further follow-up call indicated based on severity of symptoms and risk factors.       Care Transitions 24 Hour Call    Do you have any ongoing symptoms?: Yes  Patient-reported symptoms: Weakness  Interventions for patient-reported symptoms: Notified PCP/Physician  Do you have a copy of your discharge instructions?: Yes  Do you have all of your prescriptions and are they filled?: Yes  Have you been contacted by a [facility] Pharmacist?: No  Have you scheduled your follow up appointment?: Yes  How are you going to get to your appointment?: Car - family or friend to transport  Were you discharged with any Home Care or Post Acute Services: Yes  Post Acute Services:  Outpatient/Community Services (Comment: PT?OT)  Do you feel like you have everything you need to keep you well at home?: Yes  Care Transitions Interventions         Follow Up  Future Appointments   Date Time Provider Izabel Maliha   2/7/2022  2:30 PM Simi Hughes MD Neuro [facility]   5/23/2022  1:00 PM Simi Hughes MD Neuro [facility] LPN

## 2022-01-20 LAB
PROTEIN C ANTIGEN: >95 % (ref 63–153)
PROTEIN S ANTIGEN, TOTAL: 134 % (ref 84–134)

## 2022-01-21 LAB
FACTOR V MUTATION: NEGATIVE
MTHFR INTERPRETATION: ABNORMAL
MTHFR MUTATION A1286C: NEGATIVE
MTHFR MUTATION C665T: ABNORMAL
PROTEIN S ANTIGEN, FREE: 108 % (ref 74–147)
PROTHROMBIN G20210A MUTATION: NEGATIVE
PT PCR SPECIMEN: NORMAL
SPECIMEN: ABNORMAL
SPECIMEN: NORMAL

## 2022-01-21 NOTE — CARE COORDINATION
..Stroke Follow Up Phone Call    Called phone number on record - No answer.      [x]Attempt #1    []Attempt #2 (final attempt)      Electronically signed by Lenin Pozo RN on 1/21/22 at 10:08 AM EST

## 2022-01-24 NOTE — CARE COORDINATION
..    Stroke Follow up Phone Call    [place] this is Alex Geni from [facility] stroke team calling to see how you are doing since your d/c. Medication List      START taking these medications    clopidogrel 75 MG tablet  Commonly known as: PLAVIX  Take 1 tablet by mouth daily        CONTINUE taking these medications    albuterol sulfate  (90 Base) MCG/ACT inhaler  Commonly known as: Ventolin HFA  Inhale 2 puffs into the lungs every 6 hours as needed for Wheezing     aspirin 81 MG EC tablet  Take 1 tablet by mouth daily     atorvastatin 80 MG tablet  Commonly known as: LIPITOR  Take 1 tablet by mouth nightly     Claritin 10 MG tablet  Generic drug: loratadine     lisinopril 5 MG tablet  Commonly known as: PRINIVIL;ZESTRIL     metFORMIN 500 MG tablet  Commonly known as: GLUCOPHAGE  Take 1 tablet by mouth 2 times daily (with meals)           Where to Get Your Medications      These medications were sent to [address], [address] [place] [address] [address], [address] [facility] [address]    Phone: [phone]   · clopidogrel 75 MG tablet         Can you tell me what medications you are taking? [x]   Yes  []   No    Do you have any questions about your medications? [x]   Yes  []   No    All medications reviewed with patient   []  Pt provided Pharmacist contact information [phone]     Do you have a follow up apt. With the neurologist?   [x]   Yes  []   No  Provided number to [facility] [phone]    Do you now your risk factors for stroke? [x]   Yes  []   No    Can you tell me the signs and symptoms of stroke? [x]   Yes  []   No  FAST instructions provided    Do if you know what to do if you were having signs/symptoms of stroke? [x]   Yes  []   No  Instructions to call 911 provided    Our goal is to provide the very best stroke care, on a scale of 1 to 10 with 10 as the very best who would you rank the care you received?         What can we do better?       Electronically signed by Donna Lim RN on 1/24/22 at 10:51 AM EST

## 2022-02-07 ENCOUNTER — TELEPHONE (OUTPATIENT)
Dept: NEUROSURGERY | Age: 57
End: 2022-02-07

## 2022-02-07 NOTE — LETTER
[facility]   [address] [facility], [address]  Phone: [phone]  Fax: [phone]       2/7/2022    Ligia Skinner  [address] [place] [address] [id number] [place] [address]    Dear Ligia Skinner,    You recently missed a scheduled appointment with Dr. Madison Sawyer on 2/7/2022. Your health and follow-up medical care are important to us. Please call our office as soon as possible so that we may reschedule your appointment. If you have already rescheduled your appointment, please disregard this letter. This is the first scheduled appointment that you have missed within the last twelve months. If you miss 2 more appointments, you may be dismissed from the practice. For future appointments that you are unable to keep, please call the office at [phone] at least 24 hours in advance to cancel and reschedule.      Sincerely,      SARAH Jackson

## 2022-02-11 ENCOUNTER — VIRTUAL VISIT (OUTPATIENT)
Dept: NEUROLOGY | Age: 57
End: 2022-02-11
Payer: COMMERCIAL

## 2022-02-11 DIAGNOSIS — I63.9 BRAINSTEM INFARCTION (HCC): Primary | ICD-10-CM

## 2022-02-11 DIAGNOSIS — R53.1 LEFT-SIDED WEAKNESS: ICD-10-CM

## 2022-02-11 DIAGNOSIS — R29.90 STROKE-LIKE EPISODE: ICD-10-CM

## 2022-02-11 DIAGNOSIS — I63.50 RIGHT PONTINE STROKE (HCC): ICD-10-CM

## 2022-02-11 PROCEDURE — 99214 OFFICE O/P EST MOD 30 MIN: CPT | Performed by: STUDENT IN AN ORGANIZED HEALTH CARE EDUCATION/TRAINING PROGRAM

## 2022-02-11 RX ORDER — FOLIC ACID 1 MG/1
1 TABLET ORAL DAILY
Qty: 90 TABLET | Refills: 1 | Status: SHIPPED | OUTPATIENT
Start: 2022-02-11 | End: 2022-08-09

## 2022-02-11 RX ORDER — UBIDECARENONE 75 MG
1000 CAPSULE ORAL DAILY
Qty: 30 TABLET | Refills: 3 | Status: SHIPPED | OUTPATIENT
Start: 2022-02-11 | End: 2023-02-11

## 2022-02-11 ASSESSMENT — ENCOUNTER SYMPTOMS
EYE REDNESS: 0
PHOTOPHOBIA: 0

## 2022-02-11 NOTE — PROGRESS NOTES
[facility] Neurology Telehealth Followup Visit    Pt Name: Kristopher Meneses  MRN: [id number]  YOB: 1965  Date of evaluation: 2/11/2022  Primary Care Physician: No primary care provider on file. Reason for Evaluation: TELEHEALTH EVALUATION -- Audio/Visual (During XZLAP-11 public health emergency)     HPI 1/16/2022  The patient is a 59 y. o. male with past medical history of hypertension, chronic smoker, who presents with worsening left upper and lower extremity weakness and numbness.   The patient was on aspirin.  Initial NIHSS of 3 by stroke team.  Initial systolic blood pressure was more than 160.  Blood sugar within normal limits. CT head is negative for acute changes. CTA showed Borderline hemodynamically significant fusiform stenosis of the proximal intracavernous internal carotid artery on the right. Loaded with dual antiplatelets. Neuro ICU admitted. MRI of the brain with new right pontine stroke.  He denied double vision, change in speech, facial droop, right-sided weakness, seizures or loss of consciousness, dizziness, headaches, nausea or vomiting, change in bowel or urinary habits.  Neuro exam revealed 3 out of 5 in the left upper distal and proximal, 4+ out of 5 in the left lower proximal and distal, 3 out of 5 in the foot dorsiflexion. Mild decrease in sensation in the left upper and lower extremity. Hyperreflexia in the left sided extremities.     LDL is 27 hemoglobin A1c 6.7. LATA was un unremarkable  Loop recorder was placed  Hypercoagulable panel was sent and MTHFR came back he was diagnosed  Discharged on dual antiplatelets for 3 weeks then Plavix monotherapy. On Lipitor 80 mg. Physical therapy as outpatient. The patient stated he is improving.     HPI from 10/14/2021  - CTH WO neg for acute changes   - CTA H/N neg for critical stenosis or LVO              - MRI Brain WO with subacute stroke in the R BG              - ECHO 51% with neg bubble study.               - On Lipiotr 80 mg , LDL 97, HbA1c 7.7    Allergies   Allergen Reactions    Penicillins Rash     Current Outpatient Medications   Medication Sig Dispense Refill    clopidogrel (PLAVIX) 75 MG tablet Take 1 tablet by mouth daily 30 tablet 3    lisinopril (PRINIVIL;ZESTRIL) 5 MG tablet take 1 tablet by mouth once daily      aspirin 81 MG EC tablet Take 1 tablet by mouth daily 30 tablet 3    atorvastatin (LIPITOR) 80 MG tablet Take 1 tablet by mouth nightly 30 tablet 3    metFORMIN (GLUCOPHAGE) 500 MG tablet Take 1 tablet by mouth 2 times daily (with meals) 60 tablet 3    albuterol sulfate HFA (VENTOLIN HFA) 108 (90 Base) MCG/ACT inhaler Inhale 2 puffs into the lungs every 6 hours as needed for Wheezing 1 Inhaler 0    loratadine (CLARITIN) 10 MG tablet Take 10 mg by mouth daily. (Patient not taking: Reported on 1/10/2022)       No current facility-administered medications for this visit. Past Medical History:   Diagnosis Date    Hypertension     Tobacco abuse       No past surgical history on file. No family history on file. Social History     Tobacco Use    Smoking status: Current Every Day Smoker     Packs/day: 1.00     Years: 30.00     Pack years: 30.00     Types: Cigarettes    Smokeless tobacco: Never Used   Substance Use Topics    Alcohol use: Yes     Comment: rarely          Subjective:     Review of Systems   Constitutional: Negative for activity change. Eyes: Negative for photophobia and redness. Endocrine: Negative for polyuria. Neurological: Positive for speech difficulty, weakness and numbness. Negative for dizziness, tremors, seizures, syncope, facial asymmetry, light-headedness and headaches. Psychiatric/Behavioral: Negative for agitation.          Objective:   Physical Exam  General Appearance:  Alert, cooperative, no signs of distress, appears stated age   2 out of 11:  Normocephalic, no signs of trauma   Eyes:  Conjunctiva/corneas clear;  eyelids intact   Ears:  Normal external ear and canals   Nose: Nares normal, no drainage    Throat: Lips and tongue normal; teeth normal;  gums normal   Extremities: Extremities normal, no cyanosis, no edema   Skin: Skin color, texture normal, no rashes, no lesions                                     NEUROLOGIC EXAMINATION      Mental status    Alert and oriented x 3; able to follow commands, mild dysarthria, language intact; no hallucinations or delusions  Fund of information appropriate for level of education    Cranial nerves    II - grossly intact  III, IV, VI - extra-ocular muscles full: no nystagmus, no ptosis   V - normal facial sensation                                                               VII - normal facial symmetry                                                             VIII - intact hearing                                                                             IX, X - symmetrical palate                                                                  XI - symmetrical shoulder shrug                                                       XII - tongue midline without atrophy      Motor function   left upper extremity 3 out of 5, left hand is 2 out of 5  Right upper extremity with significant strength  The patient was able to walk around using a walker without assistance. Sensory function Unable to test      Cerebellar Intact fine motor movement. No involuntary movements or tremors. No ataxia or dysmetria on finger to nose      Reflex function Unable to test      Gait                   not tested           Assessment:        The patient is a 59 y. o. male with past medical history of hypertension, chronic smoker, who presents with worsening left upper and lower extremity weakness and numbness. MRI with new right pontine stroke. Loaded with dual antiplatelets. LATA is negative. Loop recorder was placed. Hypercoagulable panel came back positive for heterozygous MTHFR.     On Plavix  On Lipitor  Will start vitamin P69 and folic acid replacement. Recommendations:      -Continue Plavix 75 mg daily.  -Continue Lipitor   -Start vitamin B12 1000 mg along with folic acid 1 mg daily. MTHFR heterozygous.  -Continue physical therapy  -Follow-up with PCP in 2 weeks  -Follow-up in neurology clinic in 3 months  -Follow-up with cardiac clinic for loop recorder medication. Consultation Visit Time:  40  minutes   Discussed use, benefit, and side effects of prescribed medications. Personally reviewed imaging with patients and all questions answered. Pt voiced understanding. Patient agreed with treatment plan. Follow up as directed above. If you have any questions, please do not hesitate to call me. I look forward to following Camila Larios      Sincerely,    Dasia Roe MD  Electronically signed by Dasia Roe MD, MD on 2/11/2022 at 11:04 AM       This is a telehealth visit that was performed with the originating site at Patient Location: Patient Home and Provider Location of [place], [place]. Patient ID verified by me prior to start of this visit. Verbal consent to participate in video visit was obtained. Pursuant to the emergency declaration under the [facility], [facility] waiver authority and the [employer] Act, this Virtual Visit was conducted, with patient's consent, to reduce the patient's risk of exposure to COVID-19 and provide continuity of care for an established/new patient. Services were provided through a video synchronous discussion virtually to substitute for in-person clinic visit.  I discussed with the patient the nature of our telehealth visits via interactive/real-time audio/video that:  - I would evaluate the patient and recommend diagnostics and treatments based on my assessment  - Our sessions are not being recorded and that personal health information is protected  - Our team would provide follow up care in person if/when the patient needs it. Con Scott, was evaluated through a synchronous (real-time) audio-video encounter. The patient (or guardian if applicable) is aware that this is a billable service, which includes applicable co-pays. This Virtual Visit was conducted with patient's (and/or legal guardian's) consent. The visit was conducted pursuant to the emergency declaration under the [address] [place] [address] and the [email or web address] Act. Patient identification was verified, and a caregiver was present when appropriate. The patient was located in a state where the provider was licensed to provide care. --Vicki Aguilera MD on 2/11/2022 at 11:04 AM    An electronic signature was used to authenticate this note.

## 2022-02-18 RX ORDER — CLOPIDOGREL BISULFATE 75 MG/1
TABLET ORAL
Qty: 17 TABLET | OUTPATIENT
Start: 2022-02-18

## 2022-02-21 RX ORDER — CLOPIDOGREL BISULFATE 75 MG/1
75 TABLET ORAL DAILY
Qty: 30 TABLET | Refills: 3 | Status: SHIPPED | OUTPATIENT
Start: 2022-02-21

## 2022-08-08 NOTE — TELEPHONE ENCOUNTER
E-scribe requesting refill for Folic Acid. Please review and e-scribe if applicable.      Last Visit Date: 2/11/2022    Next Visit Date:  Future Appointments   Date Time Provider Izabel Jett   9/26/2022  3:30 PM Raad Bolden MD Neuro [facility]

## 2022-08-08 NOTE — TELEPHONE ENCOUNTER
E-scribe requesting refill for Lipitor. Please review and e-scribe if applicable.      Last Visit Date: 2/11/2022    Next Visit Date:  Future Appointments   Date Time Provider Izabel Jett   9/26/2022  3:30 PM [facility]

## 2022-08-09 RX ORDER — ATORVASTATIN CALCIUM 80 MG/1
TABLET, FILM COATED ORAL
Qty: 30 TABLET | Refills: 3 | Status: SHIPPED | OUTPATIENT
Start: 2022-08-09

## 2022-08-09 RX ORDER — FOLIC ACID 1 MG/1
TABLET ORAL
Qty: 90 TABLET | Refills: 1 | Status: SHIPPED | OUTPATIENT
Start: 2022-08-09

## 2022-09-26 ENCOUNTER — TELEMEDICINE (OUTPATIENT)
Dept: NEUROLOGY | Age: 57
End: 2022-09-26
Payer: COMMERCIAL

## 2022-09-26 DIAGNOSIS — I69.30 LATE EFFECTS OF CEREBRAL ISCHEMIC STROKE: ICD-10-CM

## 2022-09-26 DIAGNOSIS — I63.9 BRAINSTEM INFARCTION (HCC): Primary | ICD-10-CM

## 2022-09-26 DIAGNOSIS — R53.1 LEFT-SIDED WEAKNESS: ICD-10-CM

## 2022-09-26 PROCEDURE — 99214 OFFICE O/P EST MOD 30 MIN: CPT

## 2022-09-26 ASSESSMENT — ENCOUNTER SYMPTOMS
DIARRHEA: 0
NAUSEA: 0
VOMITING: 0

## 2022-09-26 NOTE — PROGRESS NOTES
difficulty, light-headedness and headaches. Psychiatric/Behavioral:  Negative for decreased concentration, dysphoric mood and sleep disturbance. Objective:   Physical Exam  General Appearance:  Alert, cooperative, no signs of distress, appears stated age   Head:  Normocephalic, no signs of trauma   Eyes:  Conjunctiva/corneas clear;  eyelids intact   Ears:  Normal external ear and canals   Nose: Nares normal, no drainage    Throat: Lips and tongue normal; teeth normal;  gums normal   Extremities: Extremities normal, no cyanosis, no edema   Skin: Skin color, texture normal, no rashes, no lesions                                     NEUROLOGIC EXAMINATION      Mental status    Alert and oriented x 3; able to follow commands, speech and language intact; no hallucinations or delusions  Fund of information appropriate for level of education    Cranial nerves    II - grossly intact  III, IV, VI - extra-ocular muscles full: no nystagmus, no ptosis   V - normal facial sensation                                                               VII - normal facial symmetry                                                             VIII - intact hearing                                                                             IX, X - symmetrical palate                                                                  XI - symmetrical shoulder shrug                                                       XII - tongue midline without atrophy      Motor function  Normal muscle bulk. Strength 3/5 in the left upper extremity and approximately 4+/5 in the left lower extremity. Strength appears to be 5/5 throughout otherwise      Sensory function Unable to test      Cerebellar Intact fine motor movement. No involuntary movements or tremors.  No ataxia or dysmetria on finger to nose      Reflex function Unable to test      Gait                   not tested       Imaging:    CT head without contrast: No acute intracranial abnormality  CTA head and neck with contrast 1/15/2022: Mild to moderate focal stenosis at the origin of the left vertebral artery with borderline hemodynamically significant fusiform stenosis of the proximal intracavernous internal carotid artery on the right  MRI brain 1/16/2022: Acute to subacute 1.5 cm infarct within the right paracentral [place] with evidence of previous right thalamic lacunar infarct    Assessment:        Gilmer Banerjee is a 60 y.o. male who has a history of hypertension, previous right basal ganglia infarct and MTHFR heterozygous trait has been evaluated as a follow-up for previous acute right pontine infarct. Patient's neurological status has been improving. He has not suffered any new neurological deficit and has been controlled on Plavix 75 mg daily and Lipitor 80 mg nightly. Patient has no new complaints at this time. Diagnosis Orders   1. Brainstem infarction ([address]  Vitamin B12 & Folate      2. Late effects of cerebral ischemic stroke        3. Left-sided weakness            Recommendations:      Return in about 1 year (around 9/26/2023). Orders Placed This Encounter   Procedures    Vitamin B12 & Folate     Standing Status:   Future     Standing Expiration Date:   9/26/2023     -Continue Plavix 75 mg daily and Lipitor 80 mg daily  -Recheck vitamin B12/folate levels  -Follow-up in 1 year             Discussed use, benefit, and side effects of prescribed medications. Personally reviewed imaging with patients and all questions answered. Pt voiced understanding. Patient agreed with treatment plan. Follow up as directed above. If you have any questions, please do not hesitate to call me. I look forward to following Gilmer Banerjee      Sincerely,    Kamran Gimenez MD  Electronically signed by Kamran Gimenez MD, MD on 9/26/2022 at 8:48 PM       This is a telehealth visit that was performed with the originating site at Patient Location: Patient Home and Provider Location of [place], [place].  Patient ID verified by me prior to start of this visit. Verbal consent to participate in video visit was obtained. Pursuant to the emergency declaration under the [address] [place] [address], [facility] waiver authority and the [employer] Act, this Virtual Visit was conducted, with patient's consent, to reduce the patient's risk of exposure to COVID-19 and provide continuity of care for an established/new patient. Services were provided through a video synchronous discussion virtually to substitute for in-person clinic visit. I discussed with the patient the nature of our telehealth visits via interactive/real-time audio/video that:  - I would evaluate the patient and recommend diagnostics and treatments based on my assessment  - Our sessions are not being recorded and that personal health information is protected  - Our team would provide follow up care in person if/when the patient needs it. Ángel Joseph, was evaluated through a synchronous (real-time) audio-video encounter. The patient (or guardian if applicable) is aware that this is a billable service, which includes applicable co-pays. This Virtual Visit was conducted with patient's (and/or legal guardian's) consent. The visit was conducted pursuant to the emergency declaration under the [facility], [address] [place] [address] waiver authority and the [employer] Act. Patient identification was verified, and a caregiver was present when appropriate. The patient was located in a state where the provider was licensed to provide care. --Soraida Smith MD on 9/26/2022 at 8:48 PM    An electronic signature was used to authenticate this note.

## 2022-12-08 RX ORDER — ATORVASTATIN CALCIUM 80 MG/1
TABLET, FILM COATED ORAL
Qty: 30 TABLET | Refills: 3 | Status: SHIPPED | OUTPATIENT
Start: 2022-12-08

## 2022-12-08 NOTE — TELEPHONE ENCOUNTER
E-scribe requesting refill for ATORVASTATIN. Please review and e-scribe if applicable. Next Visit Date:  No future appointments.

## 2023-02-06 NOTE — TELEPHONE ENCOUNTER
E-scribe requesting refill for folic acid (FOLVITE) 1 MG. Please review and e-scribe if applicable. Last Visit Date: 09/26/22    Next Visit Date:  No future appointments.

## 2023-02-14 RX ORDER — FOLIC ACID 1 MG/1
TABLET ORAL
Qty: 90 TABLET | Refills: 1 | Status: SHIPPED | OUTPATIENT
Start: 2023-02-14

## 2023-07-31 RX ORDER — ATORVASTATIN CALCIUM 80 MG/1
TABLET, FILM COATED ORAL
Qty: 30 TABLET | Refills: 3 | Status: SHIPPED | OUTPATIENT
Start: 2023-07-31

## 2023-08-21 RX ORDER — FOLIC ACID 1 MG/1
TABLET ORAL
Qty: 90 TABLET | Refills: 1 | Status: SHIPPED | OUTPATIENT
Start: 2023-08-21

## 2023-08-21 NOTE — TELEPHONE ENCOUNTER
E-scribe requesting refill for Folic Acid. Please review and e-scribe if applicable. Last Visit Date: 9/26/2022    Next Visit Date:  No future appointments.

## 2023-11-16 RX ORDER — ATORVASTATIN CALCIUM 80 MG/1
TABLET, FILM COATED ORAL
Qty: 30 TABLET | Refills: 3 | Status: SHIPPED | OUTPATIENT
Start: 2023-11-16

## 2023-11-16 NOTE — TELEPHONE ENCOUNTER
E-scribe requesting refill for Lipitor. Please review and e-scribe if applicable. Last Visit Date: 9/26/2022    Next Visit Date:  No future appointments.

## 2024-02-12 RX ORDER — FOLIC ACID 1 MG/1
1000 TABLET ORAL DAILY
Qty: 90 TABLET | Refills: 1 | Status: SHIPPED | OUTPATIENT
Start: 2024-02-12

## 2024-03-12 NOTE — TELEPHONE ENCOUNTER
E-scribe requesting refill for Atorvastatin. Please review and e-scribe if applicable.     Last Visit Date: 09/26/2022    Next Visit Date:  No future appointments.

## 2024-03-13 RX ORDER — ATORVASTATIN CALCIUM 80 MG/1
TABLET, FILM COATED ORAL
Qty: 30 TABLET | Refills: 3 | Status: SHIPPED | OUTPATIENT
Start: 2024-03-13

## 2024-08-05 RX ORDER — FOLIC ACID 1 MG/1
1000 TABLET ORAL DAILY
Qty: 90 TABLET | Refills: 1 | Status: SHIPPED | OUTPATIENT
Start: 2024-08-05

## 2025-02-11 RX ORDER — FOLIC ACID 1 MG/1
1000 TABLET ORAL DAILY
Qty: 180 TABLET | Refills: 0 | OUTPATIENT
Start: 2025-02-11